# Patient Record
Sex: MALE | Race: WHITE | NOT HISPANIC OR LATINO | Employment: FULL TIME | URBAN - METROPOLITAN AREA
[De-identification: names, ages, dates, MRNs, and addresses within clinical notes are randomized per-mention and may not be internally consistent; named-entity substitution may affect disease eponyms.]

---

## 2018-01-03 ENCOUNTER — COMMUNICATION - HEALTHEAST (OUTPATIENT)
Dept: CARDIOLOGY | Facility: CLINIC | Age: 53
End: 2018-01-03

## 2018-01-10 ENCOUNTER — COMMUNICATION - HEALTHEAST (OUTPATIENT)
Dept: CARDIOLOGY | Facility: CLINIC | Age: 53
End: 2018-01-10

## 2018-01-10 ENCOUNTER — AMBULATORY - HEALTHEAST (OUTPATIENT)
Dept: CARDIOLOGY | Facility: CLINIC | Age: 53
End: 2018-01-10

## 2018-01-10 ENCOUNTER — OFFICE VISIT - HEALTHEAST (OUTPATIENT)
Dept: CARDIOLOGY | Facility: CLINIC | Age: 53
End: 2018-01-10

## 2018-01-10 DIAGNOSIS — I42.8 NONISCHEMIC CARDIOMYOPATHY (H): ICD-10-CM

## 2018-01-10 DIAGNOSIS — I50.20 HEART FAILURE WITH REDUCED EJECTION FRACTION (H): ICD-10-CM

## 2018-01-10 LAB
ANION GAP SERPL CALCULATED.3IONS-SCNC: 10 MMOL/L (ref 5–18)
BUN SERPL-MCNC: 29 MG/DL (ref 8–22)
CALCIUM SERPL-MCNC: 9.9 MG/DL (ref 8.5–10.5)
CHLORIDE BLD-SCNC: 98 MMOL/L (ref 98–107)
CO2 SERPL-SCNC: 26 MMOL/L (ref 22–31)
CREAT SERPL-MCNC: 0.86 MG/DL (ref 0.7–1.3)
GFR SERPL CREATININE-BSD FRML MDRD: >60 ML/MIN/1.73M2
GLUCOSE BLD-MCNC: 85 MG/DL (ref 70–125)
POTASSIUM BLD-SCNC: 5.5 MMOL/L (ref 3.5–5)
SODIUM SERPL-SCNC: 134 MMOL/L (ref 136–145)

## 2018-01-10 ASSESSMENT — MIFFLIN-ST. JEOR: SCORE: 1637.78

## 2018-01-11 ENCOUNTER — AMBULATORY - HEALTHEAST (OUTPATIENT)
Dept: CARDIOLOGY | Facility: CLINIC | Age: 53
End: 2018-01-11

## 2018-01-11 DIAGNOSIS — I42.8 NONISCHEMIC CARDIOMYOPATHY (H): ICD-10-CM

## 2018-01-22 ENCOUNTER — AMBULATORY - HEALTHEAST (OUTPATIENT)
Dept: CARDIOLOGY | Facility: CLINIC | Age: 53
End: 2018-01-22

## 2018-01-26 ENCOUNTER — OFFICE VISIT - HEALTHEAST (OUTPATIENT)
Dept: CARDIOLOGY | Facility: CLINIC | Age: 53
End: 2018-01-26

## 2018-01-26 DIAGNOSIS — I50.22 CHRONIC SYSTOLIC CONGESTIVE HEART FAILURE (H): ICD-10-CM

## 2018-01-26 DIAGNOSIS — I50.20 HEART FAILURE WITH REDUCED EJECTION FRACTION, NYHA CLASS II (H): ICD-10-CM

## 2018-01-26 DIAGNOSIS — I50.9 CHF (CONGESTIVE HEART FAILURE) (H): ICD-10-CM

## 2018-01-26 LAB
ANION GAP SERPL CALCULATED.3IONS-SCNC: 8 MMOL/L (ref 5–18)
ATRIAL RATE - MUSE: 77 BPM
BNP SERPL-MCNC: 188 PG/ML (ref 0–41)
BUN SERPL-MCNC: 19 MG/DL (ref 8–22)
CALCIUM SERPL-MCNC: 9.3 MG/DL (ref 8.5–10.5)
CHLORIDE BLD-SCNC: 103 MMOL/L (ref 98–107)
CO2 SERPL-SCNC: 28 MMOL/L (ref 22–31)
CREAT SERPL-MCNC: 0.84 MG/DL (ref 0.7–1.3)
DIASTOLIC BLOOD PRESSURE - MUSE: NORMAL MMHG
GFR SERPL CREATININE-BSD FRML MDRD: >60 ML/MIN/1.73M2
GLUCOSE BLD-MCNC: 87 MG/DL (ref 70–125)
INTERPRETATION ECG - MUSE: NORMAL
MAGNESIUM SERPL-MCNC: 1.9 MG/DL (ref 1.8–2.6)
P AXIS - MUSE: 53 DEGREES
POTASSIUM BLD-SCNC: 4.5 MMOL/L (ref 3.5–5)
PR INTERVAL - MUSE: 144 MS
QRS DURATION - MUSE: 140 MS
QT - MUSE: 438 MS
QTC - MUSE: 495 MS
R AXIS - MUSE: 78 DEGREES
SODIUM SERPL-SCNC: 139 MMOL/L (ref 136–145)
SYSTOLIC BLOOD PRESSURE - MUSE: NORMAL MMHG
T AXIS - MUSE: 51 DEGREES
VENTRICULAR RATE- MUSE: 77 BPM

## 2018-01-26 ASSESSMENT — MIFFLIN-ST. JEOR: SCORE: 1665

## 2018-01-29 ENCOUNTER — COMMUNICATION - HEALTHEAST (OUTPATIENT)
Dept: CARDIOLOGY | Facility: CLINIC | Age: 53
End: 2018-01-29

## 2018-02-06 ENCOUNTER — HOSPITAL ENCOUNTER (OUTPATIENT)
Dept: CARDIOLOGY | Facility: HOSPITAL | Age: 53
Discharge: HOME | End: 2018-02-06
Attending: INTERNAL MEDICINE

## 2018-02-06 DIAGNOSIS — I50.20 HEART FAILURE WITH REDUCED EJECTION FRACTION, NYHA CLASS II (H): ICD-10-CM

## 2018-02-06 LAB
AORTIC ROOT: 3.6 CM
AORTIC VALVE MEAN VELOCITY: 82 CM/S
AV CUSP SEPERATION: 2.1 CM
AV CUSP SEPERATION: 2.1 CM
AV DIMENSIONLESS INDEX VTI: 0.9
AV MEAN GRADIENT: 3 MMHG
AV PEAK GRADIENT: 3.9 MMHG
AV VALVE AREA: 3 CM2
AV VELOCITY RATIO: 0.8
BSA FOR ECHO PROCEDURE: 1.98 M2
CV ECHO HEIGHT: 69 IN
CV ECHO WEIGHT: 178 LBS
DOP CALC AO PEAK VEL: 98.6 CM/S
DOP CALC AO VTI: 20.7 CM
DOP CALC LVOT AREA: 3.46 CM2
DOP CALC LVOT DIAMETER: 2.1 CM
DOP CALC LVOT PEAK VEL: 83.5 CM/S
DOP CALC LVOT STROKE VOLUME: 62.3 CM3
DOP CALC MV VTI: 21.2 CM
DOP CALCLVOT PEAK VEL VTI: 18 CM
EJECTION FRACTION: 31 % (ref 55–75)
FRACTIONAL SHORTENING: 11.8 % (ref 28–44)
INTERVENTRICULAR SEPTUM IN END DIASTOLE: 1.03 CM (ref 0.6–1)
IVS/PW RATIO: 0.9
LA AREA 1: 20.6 CM2
LA AREA 2: 20.2 CM2
LEFT ATRIUM LENGTH: 5.1 CM
LEFT ATRIUM SIZE: 3.4 CM
LEFT ATRIUM VOLUME INDEX: 35 ML/M2
LEFT ATRIUM VOLUME: 69.4 ML
LEFT VENTRICLE CARDIAC INDEX: 2 L/MIN/M2
LEFT VENTRICLE CARDIAC OUTPUT: 3.9 L/MIN
LEFT VENTRICLE DIASTOLIC VOLUME INDEX: 75.8 CM3/M2 (ref 34–74)
LEFT VENTRICLE DIASTOLIC VOLUME: 150 CM3 (ref 62–150)
LEFT VENTRICLE HEART RATE: 63 BPM
LEFT VENTRICLE MASS INDEX: 123.1 G/M2
LEFT VENTRICLE SYSTOLIC VOLUME INDEX: 52.5 CM3/M2 (ref 11–31)
LEFT VENTRICLE SYSTOLIC VOLUME: 104 CM3 (ref 21–61)
LEFT VENTRICULAR INTERNAL DIMENSION IN DIASTOLE: 5.69 CM (ref 4.2–5.8)
LEFT VENTRICULAR INTERNAL DIMENSION IN SYSTOLE: 5.02 CM (ref 2.5–4)
LEFT VENTRICULAR MASS: 243.7 G
LEFT VENTRICULAR OUTFLOW TRACT MEAN GRADIENT: 2 MMHG
LEFT VENTRICULAR OUTFLOW TRACT MEAN VELOCITY: 66.9 CM/S
LEFT VENTRICULAR OUTFLOW TRACT PEAK GRADIENT: 3 MMHG
LEFT VENTRICULAR POSTERIOR WALL IN END DIASTOLE: 1.09 CM (ref 0.6–1)
LV STROKE VOLUME INDEX: 31.5 ML/M2
MITRAL REGURGITANT VELOCITY TIME INTEGRAL: 180 CM
MITRAL VALVE DECELERATION SLOPE: 4680 MM/S2
MITRAL VALVE E/A RATIO: 2.2
MITRAL VALVE MEAN INFLOW VELOCITY: 48.5 CM/S
MITRAL VALVE PEAK VELOCITY: 102 CM/S
MITRAL VALVE PRESSURE HALF-TIME: 66 MS
MR FLOW: 15 CM3
MR MEAN GRADIENT: 67 MMHG
MR MEAN VELOCITY: 396 CM/S
MR PEAK GRADIENT: 98 MMHG
MV AREA VTI: 2.94 CM2
MV AVERAGE E/E' RATIO: 15.8 CM/S
MV DECELERATION TIME: 165 MS
MV E'TISSUE VEL-LAT: 7.35 CM/S
MV E'TISSUE VEL-MED: 4.35 CM/S
MV LATERAL E/E' RATIO: 12.5
MV MEAN GRADIENT: 1 MMHG
MV MEDIAL E/E' RATIO: 21.2
MV PEAK A VELOCITY: 41.2 CM/S
MV PEAK E VELOCITY: 92.2 CM/S
MV PEAK GRADIENT: 4.2 MMHG
MV VALVE AREA BY CONTINUITY EQUATION: 2.9 CM2
MV VALVE AREA PRESSURE 1/2 METHOD: 3.3 CM2
NUC REST DIASTOLIC VOLUME INDEX: 2848 LBS
NUC REST SYSTOLIC VOLUME INDEX: 69 IN
PISA MR PEAK VEL: 495 CM/S
TRICUSPID REGURGITATION PEAK PRESSURE GRADIENT: 33.2 MMHG
TRICUSPID VALVE ANULAR PLANE SYSTOLIC EXCURSION: 1.6 CM
TRICUSPID VALVE PEAK REGURGITANT VELOCITY: 288 CM/S

## 2018-02-06 ASSESSMENT — MIFFLIN-ST. JEOR: SCORE: 1637.78

## 2018-02-08 ENCOUNTER — COMMUNICATION - HEALTHEAST (OUTPATIENT)
Dept: CARDIOLOGY | Facility: CLINIC | Age: 53
End: 2018-02-08

## 2018-02-08 DIAGNOSIS — I50.9 CHF (CONGESTIVE HEART FAILURE) (H): ICD-10-CM

## 2018-02-08 DIAGNOSIS — I50.22 CHRONIC SYSTOLIC CONGESTIVE HEART FAILURE (H): ICD-10-CM

## 2018-02-09 ENCOUNTER — COMMUNICATION - HEALTHEAST (OUTPATIENT)
Dept: CARDIOLOGY | Facility: CLINIC | Age: 53
End: 2018-02-09

## 2018-02-09 DIAGNOSIS — I50.20 HEART FAILURE WITH REDUCED EJECTION FRACTION (H): ICD-10-CM

## 2018-02-16 ENCOUNTER — OFFICE VISIT - HEALTHEAST (OUTPATIENT)
Dept: CARDIOLOGY | Facility: CLINIC | Age: 53
End: 2018-02-16

## 2018-02-16 ENCOUNTER — AMBULATORY - HEALTHEAST (OUTPATIENT)
Dept: CARDIOLOGY | Facility: CLINIC | Age: 53
End: 2018-02-16

## 2018-02-16 DIAGNOSIS — I50.20 HEART FAILURE WITH REDUCED EJECTION FRACTION (H): ICD-10-CM

## 2018-02-16 DIAGNOSIS — I42.8 NONISCHEMIC CARDIOMYOPATHY (H): ICD-10-CM

## 2018-02-16 RX ORDER — ALBUTEROL SULFATE 90 UG/1
2 AEROSOL, METERED RESPIRATORY (INHALATION) EVERY 4 HOURS PRN
Refills: 0 | Status: SHIPPED | COMMUNITY
Start: 2018-02-06

## 2018-02-16 ASSESSMENT — MIFFLIN-ST. JEOR: SCORE: 1683.37

## 2018-02-20 ENCOUNTER — AMBULATORY - HEALTHEAST (OUTPATIENT)
Dept: CARDIOLOGY | Facility: CLINIC | Age: 53
End: 2018-02-20

## 2018-02-20 DIAGNOSIS — I42.9 CARDIOMYOPATHY (H): ICD-10-CM

## 2018-02-20 DIAGNOSIS — R06.09 DOE (DYSPNEA ON EXERTION): ICD-10-CM

## 2018-02-20 DIAGNOSIS — I50.9 HEART FAILURE (H): ICD-10-CM

## 2018-02-20 DIAGNOSIS — R53.83 FATIGUE: ICD-10-CM

## 2018-02-26 ENCOUNTER — COMMUNICATION - HEALTHEAST (OUTPATIENT)
Dept: CARDIOLOGY | Facility: CLINIC | Age: 53
End: 2018-02-26

## 2018-03-01 ENCOUNTER — HOSPITAL ENCOUNTER (OUTPATIENT)
Dept: MRI IMAGING | Facility: HOSPITAL | Age: 53
Discharge: HOME | End: 2018-03-01

## 2018-03-01 DIAGNOSIS — I50.9 HEART FAILURE (H): ICD-10-CM

## 2018-03-01 DIAGNOSIS — R06.09 OTHER FORMS OF DYSPNEA: ICD-10-CM

## 2018-03-01 DIAGNOSIS — I42.9 CARDIOMYOPATHY (H): ICD-10-CM

## 2018-03-01 DIAGNOSIS — R53.83 FATIGUE: ICD-10-CM

## 2018-03-01 DIAGNOSIS — R06.09 DOE (DYSPNEA ON EXERTION): ICD-10-CM

## 2018-03-02 LAB
CCTA EJECTION FRACTION: 32 %
CCTA INTERVENTRICULAR SETPUM: 0.9 CM (ref 0.6–1.1)
CCTA LEFT INTERNAL DIMENSION IN SYSTOLE: 4.8 CM (ref 2.1–4)
CCTA LEFT VENTRICULAR INTERNAL DIMENSION IN DIASTOLE: 5.9 CM (ref 3.5–6)
CCTA LEFT VENTRICULAR MASS: 194.95 G
CCTA POSTERIOR WALL: 0.8 CM (ref 0.6–1.1)
MR CARDIAC LEFT VENTRIAL CARDIAC INDEX: 1.8 L/MIN/M2 (ref 1.7–4.2)
MR CARDIAC LEFT VENTRICAL CARDIAC OUTPUT: 3.6 L/MIN (ref 2.8–8.8)
MR CARDIAC LEFT VENTRICULAR DIASTOLIC VOLUME INDEX: 75.23 ML/M2 (ref 47–92)
MR CARDIAC LEFT VENTRICULAR MASS INDEX: 84.63 G/M2 (ref 70–113)
MR CARDIAC LEFT VENTRICULAR MASS: 171 G (ref 118–238)
MR CARDIAC LEFT VENTRICULAR STROKE VOLUME INDEX: 27.72 ML/M2 (ref 32–62)
MR CARDIAC LEFT VENTRICULAR SYSTOLIC VOLUME INDEX: 47.51 ML/M2 (ref 13–30)
MR EJECTION FRACTION: 36.84 %
MR HEIGHT: 1.79 M
MR LEFT VENTRICULAR DYSTOLIC VOLUMEN: 152 ML (ref 77–195)
MR LEFT VENTRICULAR STROKE VOLUMEN: 56 ML (ref 51–133)
MR LEFT VENTRICULAR SYSTOLIC VOLUME: 96 ML (ref 19–72)
MR WEIGHT: 83 KG

## 2018-03-19 ENCOUNTER — COMMUNICATION - HEALTHEAST (OUTPATIENT)
Dept: CARDIOLOGY | Facility: CLINIC | Age: 53
End: 2018-03-19

## 2018-03-19 DIAGNOSIS — I50.20 HEART FAILURE WITH REDUCED EJECTION FRACTION (H): ICD-10-CM

## 2018-03-19 DIAGNOSIS — I42.8 NONISCHEMIC CARDIOMYOPATHY (H): ICD-10-CM

## 2018-03-26 ENCOUNTER — AMBULATORY - HEALTHEAST (OUTPATIENT)
Dept: CARDIOLOGY | Facility: CLINIC | Age: 53
End: 2018-03-26

## 2018-03-30 ENCOUNTER — OFFICE VISIT - HEALTHEAST (OUTPATIENT)
Dept: CARDIOLOGY | Facility: CLINIC | Age: 53
End: 2018-03-30

## 2018-03-30 ENCOUNTER — RECORDS - HEALTHEAST (OUTPATIENT)
Dept: ADMINISTRATIVE | Facility: OTHER | Age: 53
End: 2018-03-30

## 2018-03-30 DIAGNOSIS — I42.8 NONISCHEMIC CARDIOMYOPATHY (H): ICD-10-CM

## 2018-03-30 DIAGNOSIS — I50.20 HEART FAILURE WITH REDUCED EJECTION FRACTION (H): ICD-10-CM

## 2018-03-30 LAB
ANION GAP SERPL CALCULATED.3IONS-SCNC: 9 MMOL/L (ref 5–18)
BUN SERPL-MCNC: 17 MG/DL (ref 8–22)
CALCIUM SERPL-MCNC: 9 MG/DL (ref 8.5–10.5)
CHLORIDE BLD-SCNC: 105 MMOL/L (ref 98–107)
CO2 SERPL-SCNC: 25 MMOL/L (ref 22–31)
CREAT SERPL-MCNC: 0.76 MG/DL (ref 0.7–1.3)
GFR SERPL CREATININE-BSD FRML MDRD: >60 ML/MIN/1.73M2
GLUCOSE BLD-MCNC: 109 MG/DL (ref 70–125)
POTASSIUM BLD-SCNC: 4.3 MMOL/L (ref 3.5–5)
SODIUM SERPL-SCNC: 139 MMOL/L (ref 136–145)

## 2018-03-30 ASSESSMENT — MIFFLIN-ST. JEOR: SCORE: 1693.34

## 2018-04-04 ENCOUNTER — AMBULATORY - HEALTHEAST (OUTPATIENT)
Dept: CARDIOLOGY | Facility: CLINIC | Age: 53
End: 2018-04-04

## 2018-04-04 ENCOUNTER — COMMUNICATION - HEALTHEAST (OUTPATIENT)
Dept: CARDIOLOGY | Facility: CLINIC | Age: 53
End: 2018-04-04

## 2018-04-04 DIAGNOSIS — I42.8 NONISCHEMIC CARDIOMYOPATHY (H): ICD-10-CM

## 2018-04-10 ENCOUNTER — COMMUNICATION - HEALTHEAST (OUTPATIENT)
Dept: CARDIOLOGY | Facility: CLINIC | Age: 53
End: 2018-04-10

## 2018-04-19 ENCOUNTER — OFFICE VISIT - HEALTHEAST (OUTPATIENT)
Dept: CARDIOLOGY | Facility: CLINIC | Age: 53
End: 2018-04-19

## 2018-04-19 ENCOUNTER — HOSPITAL ENCOUNTER (OUTPATIENT)
Dept: CARDIOLOGY | Facility: HOSPITAL | Age: 53
Discharge: HOME | End: 2018-04-19

## 2018-04-19 DIAGNOSIS — I42.8 NONISCHEMIC CARDIOMYOPATHY (H): ICD-10-CM

## 2018-04-19 DIAGNOSIS — I50.20 HEART FAILURE WITH REDUCED EJECTION FRACTION (H): ICD-10-CM

## 2018-04-19 LAB
ANION GAP SERPL CALCULATED.3IONS-SCNC: 10 MMOL/L (ref 5–18)
AORTIC ROOT: 3.4 CM
AORTIC VALVE MEAN VELOCITY: 74.8 CM/S
ASCENDING AORTA: 3.6 CM
AV DIMENSIONLESS INDEX VTI: 0.8
AV MEAN GRADIENT: 3 MMHG
AV PEAK GRADIENT: 4.4 MMHG
AV VALVE AREA: 2.4 CM2
AV VELOCITY RATIO: 0.8
BSA FOR ECHO PROCEDURE: 2.05 M2
BUN SERPL-MCNC: 21 MG/DL (ref 8–22)
CALCIUM SERPL-MCNC: 9.4 MG/DL (ref 8.5–10.5)
CHLORIDE BLD-SCNC: 102 MMOL/L (ref 98–107)
CO2 SERPL-SCNC: 27 MMOL/L (ref 22–31)
CREAT SERPL-MCNC: 0.8 MG/DL (ref 0.7–1.3)
CV BLOOD PRESSURE: NORMAL MMHG
CV ECHO HEIGHT: 70 IN
CV ECHO WEIGHT: 188 LBS
DOP CALC AO PEAK VEL: 105 CM/S
DOP CALC AO VTI: 25.9 CM
DOP CALC LVOT AREA: 2.83 CM2
DOP CALC LVOT DIAMETER: 1.9 CM
DOP CALC LVOT PEAK VEL: 88.7 CM/S
DOP CALC LVOT STROKE VOLUME: 60.9 CM3
DOP CALCLVOT PEAK VEL VTI: 21.5 CM
EJECTION FRACTION: 47 % (ref 55–75)
FRACTIONAL SHORTENING: 23 % (ref 28–44)
GFR SERPL CREATININE-BSD FRML MDRD: >60 ML/MIN/1.73M2
GLUCOSE BLD-MCNC: 89 MG/DL (ref 70–125)
INTERVENTRICULAR SEPTUM IN END DIASTOLE: 1.14 CM (ref 0.6–1)
IVS/PW RATIO: 1
LA AREA 1: 18.5 CM2
LA AREA 2: 21.7 CM2
LEFT ATRIUM LENGTH: 5.3 CM
LEFT ATRIUM SIZE: 3.9 CM
LEFT ATRIUM VOLUME INDEX: 31.4 ML/M2
LEFT ATRIUM VOLUME: 64.4 ML
LEFT VENTRICLE CARDIAC INDEX: 1.9 L/MIN/M2
LEFT VENTRICLE CARDIAC OUTPUT: 3.9 L/MIN
LEFT VENTRICLE DIASTOLIC VOLUME INDEX: 65.4 CM3/M2 (ref 34–74)
LEFT VENTRICLE DIASTOLIC VOLUME: 134 CM3 (ref 62–150)
LEFT VENTRICLE HEART RATE: 64 BPM
LEFT VENTRICLE MASS INDEX: 113.3 G/M2
LEFT VENTRICLE SYSTOLIC VOLUME INDEX: 34.4 CM3/M2 (ref 11–31)
LEFT VENTRICLE SYSTOLIC VOLUME: 70.6 CM3 (ref 21–61)
LEFT VENTRICULAR INTERNAL DIMENSION IN DIASTOLE: 5.13 CM (ref 4.2–5.8)
LEFT VENTRICULAR INTERNAL DIMENSION IN SYSTOLE: 3.95 CM (ref 2.5–4)
LEFT VENTRICULAR MASS: 232.3 G
LEFT VENTRICULAR OUTFLOW TRACT MEAN GRADIENT: 2 MMHG
LEFT VENTRICULAR OUTFLOW TRACT MEAN VELOCITY: 61.2 CM/S
LEFT VENTRICULAR OUTFLOW TRACT PEAK GRADIENT: 3 MMHG
LEFT VENTRICULAR POSTERIOR WALL IN END DIASTOLE: 1.18 CM (ref 0.6–1)
LV STROKE VOLUME INDEX: 29.7 ML/M2
MITRAL VALVE E/A RATIO: 2.6
MV AVERAGE E/E' RATIO: 9.2 CM/S
MV DECELERATION TIME: 243 MS
MV E'TISSUE VEL-LAT: 10.6 CM/S
MV E'TISSUE VEL-MED: 7.93 CM/S
MV LATERAL E/E' RATIO: 8.1
MV MEDIAL E/E' RATIO: 10.8
MV PEAK A VELOCITY: 33 CM/S
MV PEAK E VELOCITY: 85.4 CM/S
NUC REST DIASTOLIC VOLUME INDEX: 3008 LBS
NUC REST SYSTOLIC VOLUME INDEX: 70 IN
POTASSIUM BLD-SCNC: 4.4 MMOL/L (ref 3.5–5)
SODIUM SERPL-SCNC: 139 MMOL/L (ref 136–145)
TRICUSPID REGURGITATION PEAK PRESSURE GRADIENT: 21.3 MMHG
TRICUSPID VALVE ANULAR PLANE SYSTOLIC EXCURSION: 1.6 CM
TRICUSPID VALVE PEAK REGURGITANT VELOCITY: 231 CM/S

## 2018-04-19 ASSESSMENT — MIFFLIN-ST. JEOR: SCORE: 1699.01

## 2018-04-20 ENCOUNTER — AMBULATORY - HEALTHEAST (OUTPATIENT)
Dept: CARDIOLOGY | Facility: CLINIC | Age: 53
End: 2018-04-20

## 2018-04-20 ENCOUNTER — COMMUNICATION - HEALTHEAST (OUTPATIENT)
Dept: CARDIOLOGY | Facility: CLINIC | Age: 53
End: 2018-04-20

## 2018-04-20 DIAGNOSIS — R00.1 BRADYCARDIA: ICD-10-CM

## 2018-04-26 ENCOUNTER — COMMUNICATION - HEALTHEAST (OUTPATIENT)
Dept: CARDIOLOGY | Facility: CLINIC | Age: 53
End: 2018-04-26

## 2018-05-18 ENCOUNTER — HOSPITAL ENCOUNTER (OUTPATIENT)
Dept: CARDIOLOGY | Facility: CLINIC | Age: 53
Discharge: HOME | End: 2018-05-18
Attending: INTERNAL MEDICINE

## 2018-05-18 ENCOUNTER — OFFICE VISIT - HEALTHEAST (OUTPATIENT)
Dept: CARDIOLOGY | Facility: CLINIC | Age: 53
End: 2018-05-18

## 2018-05-18 DIAGNOSIS — I42.9 CARDIOMYOPATHY (H): ICD-10-CM

## 2018-05-18 ASSESSMENT — MIFFLIN-ST. JEOR: SCORE: 1680.87

## 2018-05-30 ENCOUNTER — COMMUNICATION - HEALTHEAST (OUTPATIENT)
Dept: CARDIOLOGY | Facility: CLINIC | Age: 53
End: 2018-05-30

## 2018-06-07 ENCOUNTER — COMMUNICATION - HEALTHEAST (OUTPATIENT)
Dept: CARDIOLOGY | Facility: CLINIC | Age: 53
End: 2018-06-07

## 2018-06-12 ENCOUNTER — COMMUNICATION - HEALTHEAST (OUTPATIENT)
Dept: CARDIOLOGY | Facility: CLINIC | Age: 53
End: 2018-06-12

## 2018-07-17 ENCOUNTER — COMMUNICATION - HEALTHEAST (OUTPATIENT)
Dept: CARE COORDINATION | Facility: CLINIC | Age: 53
End: 2018-07-17

## 2018-07-30 ENCOUNTER — COMMUNICATION - HEALTHEAST (OUTPATIENT)
Dept: CARE COORDINATION | Facility: CLINIC | Age: 53
End: 2018-07-30

## 2018-08-22 ENCOUNTER — COMMUNICATION - HEALTHEAST (OUTPATIENT)
Dept: CARDIOLOGY | Facility: CLINIC | Age: 53
End: 2018-08-22

## 2018-09-04 ENCOUNTER — OFFICE VISIT - HEALTHEAST (OUTPATIENT)
Dept: CARDIOLOGY | Facility: CLINIC | Age: 53
End: 2018-09-04

## 2018-09-04 ENCOUNTER — AMBULATORY - HEALTHEAST (OUTPATIENT)
Dept: CARDIOLOGY | Facility: CLINIC | Age: 53
End: 2018-09-04

## 2018-09-04 DIAGNOSIS — I42.9 SECONDARY CARDIOMYOPATHY (H): ICD-10-CM

## 2018-09-04 ASSESSMENT — MIFFLIN-ST. JEOR: SCORE: 1640.04

## 2018-09-19 ENCOUNTER — HOSPITAL ENCOUNTER (OUTPATIENT)
Dept: CARDIOLOGY | Facility: HOSPITAL | Age: 53
Discharge: HOME | End: 2018-09-19
Attending: INTERNAL MEDICINE

## 2018-09-19 ENCOUNTER — AMBULATORY - HEALTHEAST (OUTPATIENT)
Dept: CARDIOLOGY | Facility: CLINIC | Age: 53
End: 2018-09-19

## 2018-09-19 DIAGNOSIS — I42.9 SECONDARY CARDIOMYOPATHY (H): ICD-10-CM

## 2018-09-19 LAB
ANION GAP SERPL CALCULATED.3IONS-SCNC: 8 MMOL/L (ref 5–18)
AORTIC ROOT: 3.6 CM
BNP SERPL-MCNC: 35 PG/ML (ref 0–42)
BSA FOR ECHO PROCEDURE: 1.98 M2
BUN SERPL-MCNC: 24 MG/DL (ref 8–22)
CALCIUM SERPL-MCNC: 9.2 MG/DL (ref 8.5–10.5)
CHLORIDE BLD-SCNC: 106 MMOL/L (ref 98–107)
CO2 SERPL-SCNC: 25 MMOL/L (ref 22–31)
CREAT SERPL-MCNC: 0.73 MG/DL (ref 0.7–1.3)
CV BLOOD PRESSURE: NORMAL MMHG
CV ECHO HEIGHT: 70 IN
CV ECHO WEIGHT: 175 LBS
DOP CALC LVOT AREA: 3.46 CM2
DOP CALC LVOT DIAMETER: 2.1 CM
DOP CALC LVOT STROKE VOLUME: 60.6 CM3
DOP CALCLVOT PEAK VEL VTI: 17.5 CM
EJECTION FRACTION: 52 % (ref 55–75)
ERYTHROCYTE [DISTWIDTH] IN BLOOD BY AUTOMATED COUNT: 12.5 % (ref 11–14.5)
FRACTIONAL SHORTENING: 29.6 % (ref 28–44)
GFR SERPL CREATININE-BSD FRML MDRD: >60 ML/MIN/1.73M2
GLUCOSE BLD-MCNC: 101 MG/DL (ref 70–125)
HCT VFR BLD AUTO: 44.6 % (ref 40–54)
HGB BLD-MCNC: 15.2 G/DL (ref 14–18)
INTERVENTRICULAR SEPTUM IN END DIASTOLE: 0.9 CM (ref 0.6–1)
IVS/PW RATIO: 0.9
LA AREA 1: 16.7 CM2
LA AREA 2: 16.6 CM2
LEFT ATRIUM LENGTH: 5.58 CM
LEFT ATRIUM SIZE: 3.2 CM
LEFT ATRIUM TO AORTIC ROOT RATIO: 0.89 NO UNITS
LEFT ATRIUM VOLUME INDEX: 21.3 ML/M2
LEFT ATRIUM VOLUME: 42.2 ML
LEFT VENTRICLE CARDIAC INDEX: 1.7 L/MIN/M2
LEFT VENTRICLE CARDIAC OUTPUT: 3.3 L/MIN
LEFT VENTRICLE DIASTOLIC VOLUME INDEX: 78.8 CM3/M2 (ref 34–74)
LEFT VENTRICLE DIASTOLIC VOLUME: 156 CM3 (ref 62–150)
LEFT VENTRICLE HEART RATE: 54 BPM
LEFT VENTRICLE MASS INDEX: 97.6 G/M2
LEFT VENTRICLE SYSTOLIC VOLUME INDEX: 37.9 CM3/M2 (ref 11–31)
LEFT VENTRICLE SYSTOLIC VOLUME: 75 CM3 (ref 21–61)
LEFT VENTRICULAR INTERNAL DIMENSION IN DIASTOLE: 5.4 CM (ref 4.2–5.8)
LEFT VENTRICULAR INTERNAL DIMENSION IN SYSTOLE: 3.8 CM (ref 2.5–4)
LEFT VENTRICULAR MASS: 193.3 G
LEFT VENTRICULAR OUTFLOW TRACT MEAN GRADIENT: 1 MMHG
LEFT VENTRICULAR OUTFLOW TRACT MEAN VELOCITY: 54.3 CM/S
LEFT VENTRICULAR POSTERIOR WALL IN END DIASTOLE: 1 CM (ref 0.6–1)
LV STROKE VOLUME INDEX: 30.6 ML/M2
MCH RBC QN AUTO: 30.3 PG (ref 27–34)
MCHC RBC AUTO-ENTMCNC: 34.1 G/DL (ref 32–36)
MCV RBC AUTO: 89 FL (ref 80–100)
MITRAL REGURGITANT VELOCITY TIME INTEGRAL: 178 CM
MITRAL VALVE E/A RATIO: 1.8
MR FLOW: 4 CM3
MR MEAN GRADIENT: 54 MMHG
MR MEAN VELOCITY: 353 CM/S
MR PEAK GRADIENT: 73.3 MMHG
MR PISA EROA: 0 CM2
MR PISA RADIUS: 0.2 CM
MR PISA VN NYQUIST: 38.5 CM/S
MV AVERAGE E/E' RATIO: 7.5 CM/S
MV DECELERATION TIME: 208 MS
MV E'TISSUE VEL-LAT: 12.3 CM/S
MV E'TISSUE VEL-MED: 7.99 CM/S
MV LATERAL E/E' RATIO: 6.2
MV MEDIAL E/E' RATIO: 9.5
MV PEAK A VELOCITY: 42.5 CM/S
MV PEAK E VELOCITY: 76.1 CM/S
MV REGURGITANT VOLUME: 4 CC
NUC REST DIASTOLIC VOLUME INDEX: 2800 LBS
NUC REST SYSTOLIC VOLUME INDEX: 70 IN
PISA MR PEAK VEL: 428 CM/S
PLATELET # BLD AUTO: 351 THOU/UL (ref 140–440)
PMV BLD AUTO: 9.1 FL (ref 8.5–12.5)
POTASSIUM BLD-SCNC: 4.6 MMOL/L (ref 3.5–5)
RBC # BLD AUTO: 5.01 MILL/UL (ref 4.4–6.2)
SODIUM SERPL-SCNC: 139 MMOL/L (ref 136–145)
TRICUSPID REGURGITATION PEAK PRESSURE GRADIENT: 16.6 MMHG
TRICUSPID VALVE ANULAR PLANE SYSTOLIC EXCURSION: 2 CM
TRICUSPID VALVE PEAK REGURGITANT VELOCITY: 204 CM/S
WBC: 7.8 THOU/UL (ref 4–11)

## 2018-09-19 ASSESSMENT — MIFFLIN-ST. JEOR: SCORE: 1640.04

## 2018-09-22 ENCOUNTER — COMMUNICATION - HEALTHEAST (OUTPATIENT)
Dept: CARDIOLOGY | Facility: CLINIC | Age: 53
End: 2018-09-22

## 2018-10-02 ENCOUNTER — COMMUNICATION - HEALTHEAST (OUTPATIENT)
Dept: CARDIOLOGY | Facility: CLINIC | Age: 53
End: 2018-10-02

## 2019-04-15 ENCOUNTER — COMMUNICATION - HEALTHEAST (OUTPATIENT)
Dept: CARDIOLOGY | Facility: CLINIC | Age: 54
End: 2019-04-15

## 2019-04-16 ENCOUNTER — RECORDS - HEALTHEAST (OUTPATIENT)
Dept: ADMINISTRATIVE | Facility: OTHER | Age: 54
End: 2019-04-16

## 2019-04-16 ENCOUNTER — AMBULATORY - HEALTHEAST (OUTPATIENT)
Dept: CARDIOLOGY | Facility: CLINIC | Age: 54
End: 2019-04-16

## 2019-04-22 ENCOUNTER — HOSPITAL ENCOUNTER (OUTPATIENT)
Dept: CARDIOLOGY | Facility: HOSPITAL | Age: 54
Discharge: HOME | End: 2019-04-22

## 2019-04-22 ENCOUNTER — COMMUNICATION - HEALTHEAST (OUTPATIENT)
Dept: CARDIOLOGY | Facility: CLINIC | Age: 54
End: 2019-04-22

## 2019-04-22 ENCOUNTER — OFFICE VISIT - HEALTHEAST (OUTPATIENT)
Dept: CARDIOLOGY | Facility: CLINIC | Age: 54
End: 2019-04-22

## 2019-04-22 DIAGNOSIS — I50.9 CHF (CONGESTIVE HEART FAILURE) (H): ICD-10-CM

## 2019-04-22 DIAGNOSIS — I42.8 NONISCHEMIC CARDIOMYOPATHY (H): ICD-10-CM

## 2019-04-22 DIAGNOSIS — I50.22 CHRONIC SYSTOLIC HEART FAILURE (H): ICD-10-CM

## 2019-04-22 DIAGNOSIS — I50.20 HEART FAILURE WITH REDUCED EJECTION FRACTION (H): ICD-10-CM

## 2019-04-22 LAB
ANION GAP SERPL CALCULATED.3IONS-SCNC: 8 MMOL/L (ref 5–18)
AORTIC ROOT: 3.6 CM
ATRIAL RATE - MUSE: 79 BPM
BNP SERPL-MCNC: 1258 PG/ML (ref 0–42)
BSA FOR ECHO PROCEDURE: 2.06 M2
BUN SERPL-MCNC: 21 MG/DL (ref 8–22)
CALCIUM SERPL-MCNC: 9.3 MG/DL (ref 8.5–10.5)
CHLORIDE BLD-SCNC: 106 MMOL/L (ref 98–107)
CO2 SERPL-SCNC: 24 MMOL/L (ref 22–31)
CREAT SERPL-MCNC: 0.84 MG/DL (ref 0.7–1.3)
CV BLOOD PRESSURE: ABNORMAL MMHG
CV ECHO HEIGHT: 70 IN
CV ECHO WEIGHT: 189 LBS
DIASTOLIC BLOOD PRESSURE - MUSE: NORMAL MMHG
DOP CALC LVOT AREA: 3.46 CM2
DOP CALC LVOT DIAMETER: 2.1 CM
DOP CALC LVOT STROKE VOLUME: 31.6 CM3
DOP CALCLVOT PEAK VEL VTI: 9.14 CM
EJECTION FRACTION: 24 % (ref 55–75)
FRACTIONAL SHORTENING: 5.1 % (ref 28–44)
GFR SERPL CREATININE-BSD FRML MDRD: >60 ML/MIN/1.73M2
GLUCOSE BLD-MCNC: 105 MG/DL (ref 70–125)
INTERPRETATION ECG - MUSE: NORMAL
INTERVENTRICULAR SEPTUM IN END DIASTOLE: 1.3 CM (ref 0.6–1)
IVS/PW RATIO: 0.9
LA AREA 1: 29.6 CM2
LA AREA 2: 26.3 CM2
LEFT ATRIUM LENGTH: 6.73 CM
LEFT ATRIUM SIZE: 4.2 CM
LEFT ATRIUM TO AORTIC ROOT RATIO: 1.17 NO UNITS
LEFT ATRIUM VOLUME INDEX: 47.7 ML/M2
LEFT ATRIUM VOLUME: 98.3 ML
LEFT VENTRICLE CARDIAC INDEX: 1 L/MIN/M2
LEFT VENTRICLE CARDIAC OUTPUT: 2.2 L/MIN
LEFT VENTRICLE DIASTOLIC VOLUME INDEX: 90.3 CM3/M2 (ref 34–74)
LEFT VENTRICLE DIASTOLIC VOLUME: 186 CM3 (ref 62–150)
LEFT VENTRICLE HEART RATE: 68 BPM
LEFT VENTRICLE MASS INDEX: 174.2 G/M2
LEFT VENTRICLE SYSTOLIC VOLUME INDEX: 68.9 CM3/M2 (ref 11–31)
LEFT VENTRICLE SYSTOLIC VOLUME: 142 CM3 (ref 21–61)
LEFT VENTRICULAR INTERNAL DIMENSION IN DIASTOLE: 5.9 CM (ref 4.2–5.8)
LEFT VENTRICULAR INTERNAL DIMENSION IN SYSTOLE: 5.6 CM (ref 2.5–4)
LEFT VENTRICULAR MASS: 358.9 G
LEFT VENTRICULAR OUTFLOW TRACT MEAN GRADIENT: 1 MMHG
LEFT VENTRICULAR OUTFLOW TRACT MEAN VELOCITY: 40.1 CM/S
LEFT VENTRICULAR POSTERIOR WALL IN END DIASTOLE: 1.4 CM (ref 0.6–1)
LV STROKE VOLUME INDEX: 15.4 ML/M2
MITRAL REGURGITANT VELOCITY TIME INTEGRAL: 153 CM
MITRAL VALVE E/A RATIO: 3.2
MR FLOW: 48 CM3
MR MEAN GRADIENT: 58 MMHG
MR MEAN VELOCITY: 354 CM/S
MR PEAK GRADIENT: 91.8 MMHG
MR PISA EROA: 0.3 CM2
MR PISA RADIUS: 0.8 CM
MR PISA VN NYQUIST: 38.5 CM/S
MV AVERAGE E/E' RATIO: 27.5 CM/S
MV DECELERATION TIME: 165 MS
MV E'TISSUE VEL-LAT: 5.55 CM/S
MV E'TISSUE VEL-MED: 2.74 CM/S
MV LATERAL E/E' RATIO: 20.5
MV MEDIAL E/E' RATIO: 41.6
MV PEAK A VELOCITY: 36 CM/S
MV PEAK E VELOCITY: 114 CM/S
MV REGURGITANT VOLUME: 49.4 CC
NUC REST DIASTOLIC VOLUME INDEX: 3024 LBS
NUC REST SYSTOLIC VOLUME INDEX: 70 IN
P AXIS - MUSE: 50 DEGREES
PISA MR PEAK VEL: 479 CM/S
POTASSIUM BLD-SCNC: 4.6 MMOL/L (ref 3.5–5)
PR INTERVAL - MUSE: 142 MS
PR MAX PG: 8 MMHG
PR PEAK VELOCITY: 144 CM/S
QRS DURATION - MUSE: 142 MS
QT - MUSE: 448 MS
QTC - MUSE: 513 MS
R AXIS - MUSE: 78 DEGREES
SODIUM SERPL-SCNC: 138 MMOL/L (ref 136–145)
SYSTOLIC BLOOD PRESSURE - MUSE: NORMAL MMHG
T AXIS - MUSE: 25 DEGREES
TRICUSPID REGURGITATION PEAK PRESSURE GRADIENT: 47.9 MMHG
TRICUSPID VALVE ANULAR PLANE SYSTOLIC EXCURSION: 1.3 CM
TRICUSPID VALVE PEAK REGURGITANT VELOCITY: 346 CM/S
VENTRICULAR RATE- MUSE: 79 BPM

## 2019-04-22 ASSESSMENT — MIFFLIN-ST. JEOR
SCORE: 1703.55
SCORE: 1703.55

## 2019-05-09 ENCOUNTER — OFFICE VISIT - HEALTHEAST (OUTPATIENT)
Dept: CARDIOLOGY | Facility: CLINIC | Age: 54
End: 2019-05-09

## 2019-05-09 DIAGNOSIS — I42.8 NONISCHEMIC CARDIOMYOPATHY (H): ICD-10-CM

## 2019-05-09 DIAGNOSIS — I50.22 CHRONIC SYSTOLIC HEART FAILURE (H): ICD-10-CM

## 2019-05-09 LAB
ANION GAP SERPL CALCULATED.3IONS-SCNC: 9 MMOL/L (ref 5–18)
BUN SERPL-MCNC: 20 MG/DL (ref 8–22)
CALCIUM SERPL-MCNC: 10 MG/DL (ref 8.5–10.5)
CHLORIDE BLD-SCNC: 102 MMOL/L (ref 98–107)
CO2 SERPL-SCNC: 27 MMOL/L (ref 22–31)
CREAT SERPL-MCNC: 0.84 MG/DL (ref 0.7–1.3)
GFR SERPL CREATININE-BSD FRML MDRD: >60 ML/MIN/1.73M2
GLUCOSE BLD-MCNC: 92 MG/DL (ref 70–125)
POTASSIUM BLD-SCNC: 4.8 MMOL/L (ref 3.5–5)
SODIUM SERPL-SCNC: 138 MMOL/L (ref 136–145)

## 2019-05-09 ASSESSMENT — MIFFLIN-ST. JEOR: SCORE: 1680.87

## 2019-05-19 ENCOUNTER — COMMUNICATION - HEALTHEAST (OUTPATIENT)
Dept: CARDIOLOGY | Facility: CLINIC | Age: 54
End: 2019-05-19

## 2019-06-03 ENCOUNTER — OFFICE VISIT - HEALTHEAST (OUTPATIENT)
Dept: CARDIOLOGY | Facility: CLINIC | Age: 54
End: 2019-06-03

## 2019-06-03 DIAGNOSIS — I42.9 SECONDARY CARDIOMYOPATHY (H): ICD-10-CM

## 2019-06-03 DIAGNOSIS — I50.9 CHF (CONGESTIVE HEART FAILURE) (H): ICD-10-CM

## 2019-06-03 LAB
ANION GAP SERPL CALCULATED.3IONS-SCNC: 5 MMOL/L (ref 5–18)
BNP SERPL-MCNC: 84 PG/ML (ref 0–42)
BUN SERPL-MCNC: 25 MG/DL (ref 8–22)
CALCIUM SERPL-MCNC: 9.3 MG/DL (ref 8.5–10.5)
CHLORIDE BLD-SCNC: 103 MMOL/L (ref 98–107)
CO2 SERPL-SCNC: 31 MMOL/L (ref 22–31)
CREAT SERPL-MCNC: 0.81 MG/DL (ref 0.7–1.3)
GFR SERPL CREATININE-BSD FRML MDRD: >60 ML/MIN/1.73M2
GLUCOSE BLD-MCNC: 96 MG/DL (ref 70–125)
MAGNESIUM SERPL-MCNC: 2 MG/DL (ref 1.8–2.6)
POTASSIUM BLD-SCNC: 4.6 MMOL/L (ref 3.5–5)
SODIUM SERPL-SCNC: 139 MMOL/L (ref 136–145)

## 2019-06-03 ASSESSMENT — MIFFLIN-ST. JEOR: SCORE: 1699.01

## 2019-06-20 ENCOUNTER — HOSPITAL ENCOUNTER (OUTPATIENT)
Dept: CARDIOLOGY | Facility: HOSPITAL | Age: 54
Discharge: HOME | End: 2019-06-20
Attending: INTERNAL MEDICINE

## 2019-06-20 DIAGNOSIS — I42.9 SECONDARY CARDIOMYOPATHY (H): ICD-10-CM

## 2019-06-20 LAB
AORTIC ROOT: 3.5 CM
AORTIC VALVE MEAN VELOCITY: 79.8 CM/S
ASCENDING AORTA: 3.5 CM
AV DIMENSIONLESS INDEX VTI: 0.7
AV MEAN GRADIENT: 3 MMHG
AV PEAK GRADIENT: 4.2 MMHG
AV VALVE AREA: 3.5 CM2
AV VELOCITY RATIO: 0.7
BSA FOR ECHO PROCEDURE: 2.05 M2
CV BLOOD PRESSURE: ABNORMAL MMHG
CV ECHO HEIGHT: 70 IN
CV ECHO WEIGHT: 188 LBS
DOP CALC AO PEAK VEL: 103 CM/S
DOP CALC AO VTI: 24.1 CM
DOP CALC LVOT AREA: 4.91 CM2
DOP CALC LVOT DIAMETER: 2.5 CM
DOP CALC LVOT PEAK VEL: 72.5 CM/S
DOP CALC LVOT STROKE VOLUME: 85.4 CM3
DOP CALCLVOT PEAK VEL VTI: 17.4 CM
ECHO EJECTION FRACTION ESTIMATED: 40 %
EJECTION FRACTION: 43 % (ref 55–75)
FRACTIONAL SHORTENING: 19.7 % (ref 28–44)
INTERVENTRICULAR SEPTUM IN END DIASTOLE: 1.04 CM (ref 0.6–1)
IVS/PW RATIO: 1.2
LA AREA 1: 24.4 CM2
LA AREA 2: 24.6 CM2
LEFT ATRIUM LENGTH: 6.4 CM
LEFT ATRIUM SIZE: 4.7 CM
LEFT ATRIUM VOLUME INDEX: 38.9 ML/M2
LEFT ATRIUM VOLUME: 79.7 ML
LEFT VENTRICLE CARDIAC INDEX: 2.2 L/MIN/M2
LEFT VENTRICLE CARDIAC OUTPUT: 4.6 L/MIN
LEFT VENTRICLE DIASTOLIC VOLUME INDEX: 58.5 CM3/M2 (ref 34–74)
LEFT VENTRICLE DIASTOLIC VOLUME: 120 CM3 (ref 62–150)
LEFT VENTRICLE HEART RATE: 54 BPM
LEFT VENTRICLE MASS INDEX: 114.4 G/M2
LEFT VENTRICLE SYSTOLIC VOLUME INDEX: 33.4 CM3/M2 (ref 11–31)
LEFT VENTRICLE SYSTOLIC VOLUME: 68.5 CM3 (ref 21–61)
LEFT VENTRICULAR INTERNAL DIMENSION IN DIASTOLE: 5.98 CM (ref 4.2–5.8)
LEFT VENTRICULAR INTERNAL DIMENSION IN SYSTOLE: 4.8 CM (ref 2.5–4)
LEFT VENTRICULAR MASS: 234.6 G
LEFT VENTRICULAR OUTFLOW TRACT MEAN GRADIENT: 1 MMHG
LEFT VENTRICULAR OUTFLOW TRACT MEAN VELOCITY: 56.4 CM/S
LEFT VENTRICULAR OUTFLOW TRACT PEAK GRADIENT: 2 MMHG
LEFT VENTRICULAR POSTERIOR WALL IN END DIASTOLE: 0.89 CM (ref 0.6–1)
LV STROKE VOLUME INDEX: 41.6 ML/M2
MITRAL VALVE DECELERATION SLOPE: 4260 MM/S2
MITRAL VALVE E/A RATIO: 3.5
MITRAL VALVE PRESSURE HALF-TIME: 71 MS
MV AVERAGE E/E' RATIO: 12.1 CM/S
MV DECELERATION TIME: 243 MS
MV E'TISSUE VEL-LAT: 9.9 CM/S
MV E'TISSUE VEL-MED: 7.07 CM/S
MV LATERAL E/E' RATIO: 10.4
MV MEDIAL E/E' RATIO: 14.6
MV PEAK A VELOCITY: 29.5 CM/S
MV PEAK E VELOCITY: 103 CM/S
MV VALVE AREA PRESSURE 1/2 METHOD: 3.1 CM2
NUC REST DIASTOLIC VOLUME INDEX: 3008 LBS
NUC REST SYSTOLIC VOLUME INDEX: 70 IN
TRICUSPID REGURGITATION PEAK PRESSURE GRADIENT: 11.3 MMHG
TRICUSPID VALVE ANULAR PLANE SYSTOLIC EXCURSION: 2 CM
TRICUSPID VALVE PEAK REGURGITANT VELOCITY: 168 CM/S

## 2019-06-20 ASSESSMENT — MIFFLIN-ST. JEOR: SCORE: 1699.01

## 2019-06-22 ENCOUNTER — COMMUNICATION - HEALTHEAST (OUTPATIENT)
Dept: CARDIOLOGY | Facility: CLINIC | Age: 54
End: 2019-06-22

## 2019-06-23 ENCOUNTER — COMMUNICATION - HEALTHEAST (OUTPATIENT)
Dept: CARDIOLOGY | Facility: CLINIC | Age: 54
End: 2019-06-23

## 2019-07-30 ENCOUNTER — RECORDS - HEALTHEAST (OUTPATIENT)
Dept: ADMINISTRATIVE | Facility: OTHER | Age: 54
End: 2019-07-30

## 2019-08-02 ENCOUNTER — COMMUNICATION - HEALTHEAST (OUTPATIENT)
Dept: CARDIOLOGY | Facility: CLINIC | Age: 54
End: 2019-08-02

## 2019-10-30 ENCOUNTER — RECORDS - HEALTHEAST (OUTPATIENT)
Dept: ADMINISTRATIVE | Facility: OTHER | Age: 54
End: 2019-10-30

## 2020-03-05 ENCOUNTER — OFFICE VISIT - HEALTHEAST (OUTPATIENT)
Dept: CARDIOLOGY | Facility: CLINIC | Age: 55
End: 2020-03-05

## 2020-03-05 DIAGNOSIS — I50.22 CHRONIC SYSTOLIC HEART FAILURE (H): ICD-10-CM

## 2020-03-05 DIAGNOSIS — I42.8 NONISCHEMIC CARDIOMYOPATHY (H): ICD-10-CM

## 2020-03-05 ASSESSMENT — MIFFLIN-ST. JEOR: SCORE: 1731.9

## 2020-06-10 ENCOUNTER — COMMUNICATION - HEALTHEAST (OUTPATIENT)
Dept: ADMINISTRATIVE | Facility: CLINIC | Age: 55
End: 2020-06-10

## 2021-03-15 ENCOUNTER — COMMUNICATION - HEALTHEAST (OUTPATIENT)
Dept: SCHEDULING | Facility: CLINIC | Age: 56
End: 2021-03-15

## 2021-03-16 ENCOUNTER — COMMUNICATION - HEALTHEAST (OUTPATIENT)
Dept: CARDIOLOGY | Facility: CLINIC | Age: 56
End: 2021-03-16

## 2021-03-16 ENCOUNTER — RECORDS - HEALTHEAST (OUTPATIENT)
Dept: ADMINISTRATIVE | Facility: OTHER | Age: 56
End: 2021-03-16

## 2021-03-17 ENCOUNTER — OFFICE VISIT - HEALTHEAST (OUTPATIENT)
Dept: CARDIOLOGY | Facility: CLINIC | Age: 56
End: 2021-03-17

## 2021-03-17 DIAGNOSIS — I50.20 HEART FAILURE WITH REDUCED EJECTION FRACTION (H): ICD-10-CM

## 2021-03-17 DIAGNOSIS — I42.8 NONISCHEMIC CARDIOMYOPATHY (H): ICD-10-CM

## 2021-03-17 ASSESSMENT — MIFFLIN-ST. JEOR: SCORE: 1678.61

## 2021-03-24 ENCOUNTER — COMMUNICATION - HEALTHEAST (OUTPATIENT)
Dept: CARDIOLOGY | Facility: CLINIC | Age: 56
End: 2021-03-24

## 2021-03-25 ENCOUNTER — COMMUNICATION - HEALTHEAST (OUTPATIENT)
Dept: CARDIOLOGY | Facility: CLINIC | Age: 56
End: 2021-03-25

## 2021-03-25 ENCOUNTER — OFFICE VISIT - HEALTHEAST (OUTPATIENT)
Dept: CARDIOLOGY | Facility: CLINIC | Age: 56
End: 2021-03-25

## 2021-03-25 DIAGNOSIS — I50.9 ACUTE DECOMPENSATED HEART FAILURE (H): ICD-10-CM

## 2021-03-25 DIAGNOSIS — I63.411 CEREBRAL INFARCTION DUE TO EMBOLISM OF RIGHT MIDDLE CEREBRAL ARTERY (H): ICD-10-CM

## 2021-03-25 DIAGNOSIS — I50.20 HEART FAILURE WITH REDUCED EJECTION FRACTION (H): ICD-10-CM

## 2021-03-25 DIAGNOSIS — I42.8 NONISCHEMIC CARDIOMYOPATHY (H): ICD-10-CM

## 2021-03-25 LAB
ANION GAP SERPL CALCULATED.3IONS-SCNC: 9 MMOL/L (ref 5–18)
BUN SERPL-MCNC: 25 MG/DL (ref 8–22)
CALCIUM SERPL-MCNC: 9.2 MG/DL (ref 8.5–10.5)
CHLORIDE BLD-SCNC: 101 MMOL/L (ref 98–107)
CO2 SERPL-SCNC: 28 MMOL/L (ref 22–31)
CREAT SERPL-MCNC: 0.9 MG/DL (ref 0.7–1.3)
GFR SERPL CREATININE-BSD FRML MDRD: >60 ML/MIN/1.73M2
GLUCOSE BLD-MCNC: 96 MG/DL (ref 70–125)
POTASSIUM BLD-SCNC: 5 MMOL/L (ref 3.5–5)
SODIUM SERPL-SCNC: 138 MMOL/L (ref 136–145)

## 2021-03-25 RX ORDER — ATORVASTATIN CALCIUM 40 MG/1
40 TABLET, FILM COATED ORAL DAILY
Qty: 90 TABLET | Refills: 0 | Status: SHIPPED | OUTPATIENT
Start: 2021-03-25 | End: 2021-09-17

## 2021-03-25 RX ORDER — SPIRONOLACTONE 25 MG/1
12.5 TABLET ORAL DAILY
Qty: 45 TABLET | Refills: 0 | Status: SHIPPED | OUTPATIENT
Start: 2021-03-25 | End: 2021-07-28

## 2021-03-25 RX ORDER — ENALAPRIL MALEATE 10 MG/1
10 TABLET ORAL DAILY
Qty: 90 TABLET | Refills: 0 | Status: SHIPPED | OUTPATIENT
Start: 2021-03-25 | End: 2021-07-28

## 2021-03-25 ASSESSMENT — MIFFLIN-ST. JEOR: SCORE: 1651.39

## 2021-04-01 ENCOUNTER — RECORDS - HEALTHEAST (OUTPATIENT)
Dept: ADMINISTRATIVE | Facility: OTHER | Age: 56
End: 2021-04-01

## 2021-04-01 ENCOUNTER — AMBULATORY - HEALTHEAST (OUTPATIENT)
Dept: CARDIOLOGY | Facility: CLINIC | Age: 56
End: 2021-04-01

## 2021-04-02 ENCOUNTER — RECORDS - HEALTHEAST (OUTPATIENT)
Dept: ADMINISTRATIVE | Facility: OTHER | Age: 56
End: 2021-04-02

## 2021-04-02 ENCOUNTER — AMBULATORY - HEALTHEAST (OUTPATIENT)
Dept: CARDIOLOGY | Facility: CLINIC | Age: 56
End: 2021-04-02

## 2021-04-08 ENCOUNTER — OFFICE VISIT - HEALTHEAST (OUTPATIENT)
Dept: CARDIOLOGY | Facility: CLINIC | Age: 56
End: 2021-04-08

## 2021-04-08 DIAGNOSIS — I42.8 NONISCHEMIC CARDIOMYOPATHY (H): ICD-10-CM

## 2021-04-08 DIAGNOSIS — I50.20 HEART FAILURE WITH REDUCED EJECTION FRACTION (H): ICD-10-CM

## 2021-04-08 DIAGNOSIS — I50.9 ACUTE DECOMPENSATED HEART FAILURE (H): ICD-10-CM

## 2021-04-08 LAB
ANION GAP SERPL CALCULATED.3IONS-SCNC: 9 MMOL/L (ref 5–18)
BUN SERPL-MCNC: 27 MG/DL (ref 8–22)
CALCIUM SERPL-MCNC: 8.8 MG/DL (ref 8.5–10.5)
CHLORIDE BLD-SCNC: 103 MMOL/L (ref 98–107)
CO2 SERPL-SCNC: 25 MMOL/L (ref 22–31)
CREAT SERPL-MCNC: 0.9 MG/DL (ref 0.7–1.3)
GFR SERPL CREATININE-BSD FRML MDRD: >60 ML/MIN/1.73M2
GLUCOSE BLD-MCNC: 99 MG/DL (ref 70–125)
POTASSIUM BLD-SCNC: 4.7 MMOL/L (ref 3.5–5)
SODIUM SERPL-SCNC: 137 MMOL/L (ref 136–145)

## 2021-04-08 ASSESSMENT — MIFFLIN-ST. JEOR: SCORE: 1696.75

## 2021-04-22 ENCOUNTER — RECORDS - HEALTHEAST (OUTPATIENT)
Dept: ADMINISTRATIVE | Facility: OTHER | Age: 56
End: 2021-04-22

## 2021-04-22 ENCOUNTER — AMBULATORY - HEALTHEAST (OUTPATIENT)
Dept: CARDIOLOGY | Facility: CLINIC | Age: 56
End: 2021-04-22

## 2021-04-26 ENCOUNTER — OFFICE VISIT - HEALTHEAST (OUTPATIENT)
Dept: CARDIOLOGY | Facility: CLINIC | Age: 56
End: 2021-04-26

## 2021-04-26 ENCOUNTER — COMMUNICATION - HEALTHEAST (OUTPATIENT)
Dept: CARDIOLOGY | Facility: CLINIC | Age: 56
End: 2021-04-26

## 2021-04-26 DIAGNOSIS — I42.8 NONISCHEMIC CARDIOMYOPATHY (H): ICD-10-CM

## 2021-04-26 DIAGNOSIS — I50.9 CHF (CONGESTIVE HEART FAILURE) (H): ICD-10-CM

## 2021-04-26 DIAGNOSIS — I50.20 HEART FAILURE WITH REDUCED EJECTION FRACTION (H): ICD-10-CM

## 2021-04-26 LAB
ANION GAP SERPL CALCULATED.3IONS-SCNC: 9 MMOL/L (ref 5–18)
BNP SERPL-MCNC: 99 PG/ML (ref 0–45)
BUN SERPL-MCNC: 19 MG/DL (ref 8–22)
CALCIUM SERPL-MCNC: 8.7 MG/DL (ref 8.5–10.5)
CHLORIDE BLD-SCNC: 104 MMOL/L (ref 98–107)
CO2 SERPL-SCNC: 27 MMOL/L (ref 22–31)
CREAT SERPL-MCNC: 0.82 MG/DL (ref 0.7–1.3)
GFR SERPL CREATININE-BSD FRML MDRD: >60 ML/MIN/1.73M2
GLUCOSE BLD-MCNC: 95 MG/DL (ref 70–125)
MAGNESIUM SERPL-MCNC: 2 MG/DL (ref 1.8–2.6)
POTASSIUM BLD-SCNC: 4.3 MMOL/L (ref 3.5–5)
SODIUM SERPL-SCNC: 140 MMOL/L (ref 136–145)

## 2021-04-27 ENCOUNTER — AMBULATORY - HEALTHEAST (OUTPATIENT)
Dept: CARDIOLOGY | Facility: CLINIC | Age: 56
End: 2021-04-27

## 2021-04-27 DIAGNOSIS — I50.9 ACUTE DECOMPENSATED HEART FAILURE (H): ICD-10-CM

## 2021-05-12 ENCOUNTER — RECORDS - HEALTHEAST (OUTPATIENT)
Dept: CARDIOLOGY | Facility: CLINIC | Age: 56
End: 2021-05-12

## 2021-05-12 ENCOUNTER — RECORDS - HEALTHEAST (OUTPATIENT)
Dept: ADMINISTRATIVE | Facility: OTHER | Age: 56
End: 2021-05-12

## 2021-05-14 ENCOUNTER — OFFICE VISIT - HEALTHEAST (OUTPATIENT)
Dept: CARDIOLOGY | Facility: CLINIC | Age: 56
End: 2021-05-14

## 2021-05-14 ENCOUNTER — HOSPITAL ENCOUNTER (OUTPATIENT)
Dept: CARDIOLOGY | Facility: CLINIC | Age: 56
Discharge: HOME | End: 2021-05-14
Attending: INTERNAL MEDICINE

## 2021-05-14 DIAGNOSIS — I50.20 HEART FAILURE WITH REDUCED EJECTION FRACTION (H): ICD-10-CM

## 2021-05-14 DIAGNOSIS — I42.8 NONISCHEMIC CARDIOMYOPATHY (H): ICD-10-CM

## 2021-05-14 DIAGNOSIS — I50.9 CHF (CONGESTIVE HEART FAILURE) (H): ICD-10-CM

## 2021-05-14 LAB
BSA FOR ECHO PROCEDURE: 2.07 M2
CV BLOOD PRESSURE: NORMAL MMHG
CV ECHO HEIGHT: 69 IN
CV ECHO WEIGHT: 194 LBS
ECHO EJECTION FRACTION ESTIMATED: 40 %
EJECTION FRACTION: 36 % (ref 55–75)
INTERVENTRICULAR SEPTUM IN END DIASTOLE: 0.9 CM (ref 0.6–1)
IVS/PW RATIO: 1
LEFT VENTRICLE DIASTOLIC VOLUME INDEX: 43.7 CM3/M2 (ref 34–74)
LEFT VENTRICLE DIASTOLIC VOLUME: 90.5 CM3 (ref 62–150)
LEFT VENTRICLE HEART RATE: 64 BPM
LEFT VENTRICLE MASS INDEX: 101.2 G/M2
LEFT VENTRICLE SYSTOLIC VOLUME INDEX: 27.9 CM3/M2 (ref 11–31)
LEFT VENTRICLE SYSTOLIC VOLUME: 57.8 CM3 (ref 21–61)
LEFT VENTRICULAR INTERNAL DIMENSION IN DIASTOLE: 5.9 CM (ref 4.2–5.8)
LEFT VENTRICULAR MASS: 209.6 G
LEFT VENTRICULAR POSTERIOR WALL IN END DIASTOLE: 0.9 CM (ref 0.6–1)
NUC REST DIASTOLIC VOLUME INDEX: 3104 LBS
NUC REST SYSTOLIC VOLUME INDEX: 69 IN

## 2021-05-14 RX ORDER — CARVEDILOL 12.5 MG/1
18.75 TABLET ORAL 2 TIMES DAILY WITH MEALS
Qty: 180 TABLET | Refills: 3 | Status: SHIPPED | OUTPATIENT
Start: 2021-05-14 | End: 2021-07-28

## 2021-05-14 ASSESSMENT — MIFFLIN-ST. JEOR
SCORE: 1714.89
SCORE: 1710.36

## 2021-05-27 VITALS — BODY MASS INDEX: 28.73 KG/M2 | HEIGHT: 69 IN | WEIGHT: 194 LBS

## 2021-05-27 VITALS
RESPIRATION RATE: 16 BRPM | WEIGHT: 195 LBS | HEART RATE: 64 BPM | BODY MASS INDEX: 28.88 KG/M2 | SYSTOLIC BLOOD PRESSURE: 122 MMHG | DIASTOLIC BLOOD PRESSURE: 78 MMHG | HEIGHT: 69 IN

## 2021-05-27 NOTE — TELEPHONE ENCOUNTER
"----- Message from Adriana Montelongo sent at 4/15/2019 10:00 AM CDT -----  Contact: JULIETA'S EX WIFE MARGIE  General phone call:    Caller: margie  Primary cardiologist: pt is seeing abbey on 4/22  Detailed reason for call: Margie called stating that Julieta is very non compliant. He has been feeling very crummy lately, hard time breathing (he does have ashma) and other issues. She is hoping to have a nurse call her so she can explain to them how to \"deal with him\".   New or active symptoms? active  Best phone number: [phone]  Best time to contact: any  Ok to leave a detailed message? yes  Device? no    Additional Info:   "

## 2021-05-27 NOTE — TELEPHONE ENCOUNTER
Received a call from patient's ex-wife. She wanted to update Vane that the patient has been non-compliant since the Fall when he was told he may need an ICD. Per his ex-wife, approach is key with him. When it was addressed back in the fall, apparently the patient felt that all we wanted to do was to make extra money as his ejection fraction was improved with medication. Informed Margie that it is crucial for him to comply with appts and medications as these can increase his EF but he needs to continue his medications as if he does not, he may require ICD. She knows all this and reports that he can be quite stubborn. She just wanted us to be aware of this and that he really likes Vane and will listen to what she has to say. He has had some symptoms of fatigue and shortness of breath lately and she is not sure if he has been taking his medications or not. Will pass along.      Vane,  Non-urgent update on Dwain for your return. He will see you 4/22/19.  Thanks,  Mal

## 2021-05-28 NOTE — TELEPHONE ENCOUNTER
Left VM for patient regarding lab results. Kidney function and electrolytes normal. BNP moderately elevated. Instructed him to start taking lasix 40 mg daily and will send a new script to pharmacy. Instructed him to call Zeny if any questions and to call this week if symptoms do not improve. Med list updated.

## 2021-05-28 NOTE — PATIENT INSTRUCTIONS - HE
Matthew W Behr,    It was a pleasure to see you today at the [facility].     My recommendations after this visit include:  - Please follow up with Dr Oliveira in 6-8 weeks   - Please follow up with Vane Martin in 2 weeks  - I have checked some labs and will contact you with the results  - I have ordered an echocardiogram, please schedule at your earliest convenience    Vane Martin CNP    What is the [facility] Failure [facility]?     The [facility] is a heart failure specialty clinic within [facility].  You will work with your cardiologist, nurse practitioner, and nurses to carefully adjust medications and learn how to live with heart failure.  The Heart Failure Program will help you:      Better understand your chronic heart condition    Feel better and avoid hospital stays    Monitoring for Symptoms      Call the Heart Failure Phone Line ([phone]) if you have any of these symptoms:     Increased shortness of breath/shortness of breath at rest    Waking up at night with difficulty breathing    Unable to lie down for sleep due to symptoms or needing to sit upright for sleep    Weight gain of 2 pounds a day for 2 days in a row OR 5 pounds in 1 week    Increased swelling in your ankles or legs    Dizziness or lightheadedness    Medications       Take your medications as prescribed    Bring all your medications in their original bottles to every appointment    Avoid non-steroidal anti-inflammatory medications (Advil, Aleve, Ibuprofen, Naprosyn, Naproxen, Celebrex)    Do not stop taking your medications or begin taking over-the-counter or herbal medications without first talking to your doctor or nurse practitioner    Diet and Lifestyle       Limit sodium/salt to 2000 mg daily   o Read food labels for sodium content  o Do not add salt when cooking or add salt at the table    Weigh yourself every day and record in your daily weight log   o Call if you  gain 2 pounds a day for 2 days in a row OR 5 pounds in 1 week  o Bring daily weight log to every appointment    Stay active, pace yourself, listen to your body, and rest when tired    Elevate your legs if they are swollen. Ask about using compression/support stockings    Stop smoking    Lose weight if you are overweight    Avoid drinking alcohol or limit amount    Stay updated on your immunizations including flu and pneumonia vaccines

## 2021-05-28 NOTE — PATIENT INSTRUCTIONS - HE
Matthew W Behr,    It was a pleasure to see you today at the [facility].     My recommendations after this visit include:  - Please follow up with Dr Oliveira Concha 3   - Please follow up with Vane Martin in 6 weeks  - I have checked labs and will contact you with results on MyChart  - Stop your spironolactone    Vane Martin, CNP

## 2021-05-29 NOTE — PATIENT INSTRUCTIONS - HE
Please call my nurse Paradise with any heart related questions.Her number is [phone].Please contact your insurance and find out the cost of Entresto at let my know.

## 2021-05-29 NOTE — PROGRESS NOTES
[facility] Progress Note    Assessment:  1.  Heart failure.  Nonischemic cardiomyopathy with acute worsening of heart failure symptoms treated in the hospital April 2019.  Likely exacerbated by not taking his medications for a few months prior.  We spent some time discussing the importance of his medications and have arranged for follow-up echocardiogram and laboratory studies.  He did not reportedly tolerate Aldactone because of nausea.  We will make additional recommendations pending the outcome of the tests regarding Acacian adjustment.  I have asked him to be prudent with respect to his exercise and continue to monitor his weights and sodium intake.  He is asked to be in touch with us with any specific questions.  I asked him to be in touch with his insurance carrier regarding the potential cost of Entresto.      Plan: Lab studies.  Echocardiogram.  Follow-up 6 weeks.    1. Secondary cardiomyopathy (H)  Echo Complete   2. CHF (congestive heart failure) (H)  BNP(B-type Natriuretic Peptide)    Basic metabolic panel    Magnesium         An After Visit Summary was printed and given to the patient.    Subjective:    Matthew W Behr is a 53 y.o. male who returned for a planned  follow up visit.  He reports that over the past few weeks he is been feeling much better.  He has a history of nonischemic cardiomyopathy and when we visited September 2018 he was well compensated and BNP was down to 35.  Presented initially December 2017 with findings of heart failure.  He was started on Procrit medication and had no obvious obstructive coronary artery disease on coronary angiography.  He had significant improvement in left ventricular function with ejection fraction that increased to 40 to 45%.  Unfortunately a number of months ago he stopped his carvedilol and then presented with increasing symptoms of shortness of breath that was initially treated for pneumonia.  He subsequently was evaluated here in our  clinic and admitted to the hospital where he was found to have a left ventricular systolic dysfunction and worsening heart failure symptoms.  His BNP in the April 2019 admission was up to 1258.  Did not tolerate spironolactone because of nausea but has been tolerating his current combination of medications which we reviewed in detail.  We again did discuss the importance of taking his medications and the reasons for his medications.  I did ask him to further investigate potential cost of Entresto as he may benefit from changing to Entresto.    Denies orthopnea or PND.  He states he has been active and has had no specific difficulty with shortness of breath, dizziness, palpitation over the last few weeks.    Review of Systems:   General: WNL  Eyes: WNL  Ears/Nose/Throat: WNL  Lungs: WNL  Heart: WNL  Stomach: WNL  Bladder: WNL  Muscle/Joints: WNL  Skin: WNL  Nervous System: WNL  Mental Health: WNL     Blood: WNL      Problem List:    Patient Active Problem List   Diagnosis     Mild intermittent asthma without complication     Wide-complex tachycardia (H)     Heart failure with reduced ejection fraction (H)     Nonischemic cardiomyopathy (H)     Amphetamine urine screen positive     Acute CHF (H)     Chest pain with normal coronary angiography     Acute on chronic systolic congestive heart failure (H)       Social History     Socioeconomic History     Marital status:      Spouse name: Not on file     Number of children: Not on file     Years of education: Not on file     Highest education level: Not on file   Occupational History     Employer: [employer]   Social Needs     Financial resource strain: Not on file     Food insecurity:     Worry: Not on file     Inability: Not on file     Transportation needs:     Medical: Not on file     Non-medical: Not on file   Tobacco Use     Smoking status: Never Smoker     Smokeless tobacco: Current User     Types: Chew   Substance and Sexual Activity     Alcohol use: Yes     " Comment: 5 beers per week     Drug use: No     Sexual activity: Not on file   Lifestyle     Physical activity:     Days per week: Not on file     Minutes per session: Not on file     Stress: Not on file   Relationships     Social connections:     Talks on phone: Not on file     Gets together: Not on file     Attends Jainism service: Not on file     Active member of club or organization: Not on file     Attends meetings of clubs or organizations: Not on file     Relationship status: Not on file     Intimate partner violence:     Fear of current or ex partner: Not on file     Emotionally abused: Not on file     Physically abused: Not on file     Forced sexual activity: Not on file   Other Topics Concern     Not on file   Social History Narrative     Not on file       Family History   Problem Relation Age of Onset     COPD Mother        Current Outpatient Medications   Medication Sig Dispense Refill     aspirin 81 MG EC tablet Take 1 tablet (81 mg total) by mouth daily.  0     carvedilol (COREG) 12.5 MG tablet Take 1 tablet (12.5 mg total) by mouth 2 (two) times a day with meals. 180 tablet 3     enalapril (VASOTEC) 10 MG tablet Take 1 tablet (10 mg total) by mouth 2 (two) times a day. 180 tablet 3     furosemide (LASIX) 40 MG tablet Take 1 tablet (40 mg total) by mouth 2 (two) times a day at 9am and 6pm. (Patient taking differently: Take 20 mg by mouth 2 (two) times a day at 9am and 6pm.       ) 60 tablet 5     VENTOLIN HFA 90 mcg/actuation inhaler Take 2 puffs by mouth every 4 (four) hours as needed.         0     No current facility-administered medications for this visit.        Objective:     /68 (Patient Site: Left Arm, Patient Position: Sitting, Cuff Size: Adult Regular)   Pulse (!) 56   Resp 16   Ht 5' 10\" (1.778 m)   Wt 188 lb (85.3 kg)   BMI 26.98 kg/m    188 lb (85.3 kg)   184 pounds January 2018  189 pounds April 22, 2019.    Physical Exam:    GENERAL APPEARANCE: alert, no apparent " distress  HEENT: no scleral icterus or xanthelasma  NECK: jugular venous pressure within normal limits.  CHEST: symmetric, the lungs are clear to auscultation  CARDIOVASCULAR: regular rhythm without significant murmur, click, or gallop; no carotid bruits  Abdomen: No Organomegaly, masses, bruits, or tenderness. Bowels sounds are present      EXTREMITIES: no cyanosis, clubbing or edema    Cardiac Testing:  Patient Information     Patient Name  Behr, Matthew W MRN  [id number] Sex  Male              Age  1965 (53 y.o.)   Indications     Dx: Nonischemic cardiomyopathy (H) [I42.8 (ICD-10-CM)]   Summary       Left ventricle is mild to moderately dilated    Left ventricle ejection fraction is severely globally reduced. The calculated left ventricular ejection fraction is 24%.    E/e' > 15, suggesting elevated LV filling pressures.    Moderate left atrial enlargement with mild right atrial enlargement    Mild right ventricular enlargement with moderate reduction in right ventricular function    Severe eccentric jet of mitral regurgitation.    Moderate tricuspid regurgitation with elevated estimate of RV systolic pressures at 48 mmHg plus right atrial pressure    Dilated IVC consistent with elevated central venous pressure    When compared to the previous study dated 2018, there has been a significant interval decline in both LV function and RV function with dilatation of both the left ventricle and right ventricle. Significant mitral regurgitation now noted     HOLTER MONITOR     INTERPRETATION:  Predominant rhythm is sinus rhythm with rates ranging from 38 beats  per minute to 124 beats per minute.  An IVCD, probably right bundle-branch block is  present throughout the monitoring period.  Sinus arrhythmia was noted at night.   There were no clinically significant pauses.  Occasional atrial premature beats were  noted, 352 over 24 hours.  There were 8 couplets.  There were 4 short runs of  primary atrial  tachycardia; the longest lasted 2.5 seconds at a rate of 160 beats  per minute.  No atrial fibrillation was noted.  Rare single ventricular premature  beats were present, 64 over 24 hours with a single ventricular couplet.  No diary  was submitted.     CONCLUSION:  Essentially normal Holter.  Right bundle-branch block and short runs of  primary atrial tachycardia noted.        OSEI MANNING 2018 13:07:45  T 2018 13:31:23  R 2018 13:31:23  [id number]    Reading physician: Grover Oliveira MD Ordering physician: Nelly Petit MD Study date: 1/3/18   Patient Information     Patient Name  Behr, Matthew W MRN  [id number] Sex  Male              Age  1965 (53 y.o.)   Indications     cardiomyopathy; Heart failure; Newly diagnosed cardiomyopathy   Interpretation Summary       The total Agatston calcium score is 0. A calcium score of zero places the individual in the lowest quartile when compared to an age and gender matched control group and implies a low risk of cardiac events in the next 10 years. (less than 1% per year).    No detectable atherosclerotic plaque or significant stenosis observed.    Significant misregistration artifact decreases ability to interpret the examination the findings are most consistent with artifact rather than obstructive artery disease.    Mild dilatation of the proximal aorta at the level of the sinuses of Valsalva    Please see radiology interpretation for additional findings.       Lab Results:    Lab Results   Component Value Date     2019    K 4.8 2019     2019    CO2 27 2019    BUN 20 2019    CREATININE 0.84 2019    CALCIUM 10.0 2019     Lab Results   Component Value Date    CHOL 145 2018    TRIG 83 2018    HDL 51 2018     BNP (pg/mL)   Date Value   2019 1,258 (H)   2018 35   2018 188 (H)     Creatinine (mg/dL)   Date Value   2019 0.84   2019  0.88   04/23/2019 0.88   04/22/2019 0.83     LDL Calculated (mg/dL)   Date Value   01/01/2018 77     Lab Results   Component Value Date    WBC 10.8 04/22/2019    HGB 14.7 04/22/2019    HCT 44.5 04/22/2019    MCV 90 04/22/2019     04/22/2019               This note has been dictated using voice recognition software. Any grammatical or context distortions are unintentional and inherent to the software.      Grover Oliveira M.D., F.A.C.C.  [facility]  [phone]

## 2021-05-31 VITALS — BODY MASS INDEX: 27.25 KG/M2 | HEIGHT: 69 IN | WEIGHT: 184 LBS

## 2021-05-31 VITALS — WEIGHT: 178 LBS | HEIGHT: 69 IN | BODY MASS INDEX: 26.36 KG/M2

## 2021-05-31 VITALS — HEIGHT: 71 IN | WEIGHT: 182.8 LBS | BODY MASS INDEX: 25.59 KG/M2

## 2021-06-01 VITALS — BODY MASS INDEX: 26.34 KG/M2 | WEIGHT: 184 LBS | HEIGHT: 70 IN

## 2021-06-01 VITALS — BODY MASS INDEX: 25.05 KG/M2 | HEIGHT: 70 IN | WEIGHT: 175 LBS

## 2021-06-01 VITALS — BODY MASS INDEX: 26.92 KG/M2 | WEIGHT: 188 LBS | HEIGHT: 70 IN

## 2021-06-01 VITALS — WEIGHT: 185 LBS | HEIGHT: 71 IN | BODY MASS INDEX: 25.9 KG/M2

## 2021-06-02 VITALS — WEIGHT: 184 LBS | BODY MASS INDEX: 26.34 KG/M2 | HEIGHT: 70 IN

## 2021-06-02 VITALS — WEIGHT: 189 LBS | HEIGHT: 70 IN | BODY MASS INDEX: 27.06 KG/M2

## 2021-06-02 VITALS — WEIGHT: 188 LBS | HEIGHT: 70 IN | BODY MASS INDEX: 26.92 KG/M2

## 2021-06-03 VITALS — BODY MASS INDEX: 26.92 KG/M2 | HEIGHT: 70 IN | WEIGHT: 188 LBS

## 2021-06-04 ENCOUNTER — OFFICE VISIT - HEALTHEAST (OUTPATIENT)
Dept: CARDIOLOGY | Facility: CLINIC | Age: 56
End: 2021-06-04

## 2021-06-04 VITALS
HEART RATE: 68 BPM | RESPIRATION RATE: 16 BRPM | SYSTOLIC BLOOD PRESSURE: 132 MMHG | WEIGHT: 197 LBS | BODY MASS INDEX: 28.2 KG/M2 | DIASTOLIC BLOOD PRESSURE: 80 MMHG | HEIGHT: 70 IN

## 2021-06-04 DIAGNOSIS — I50.20 HEART FAILURE WITH REDUCED EJECTION FRACTION (H): ICD-10-CM

## 2021-06-04 DIAGNOSIS — I42.8 NONISCHEMIC CARDIOMYOPATHY (H): ICD-10-CM

## 2021-06-04 LAB
ANION GAP SERPL CALCULATED.3IONS-SCNC: 8 MMOL/L (ref 5–18)
BNP SERPL-MCNC: 80 PG/ML (ref 0–45)
BUN SERPL-MCNC: 21 MG/DL (ref 8–22)
CALCIUM SERPL-MCNC: 8.6 MG/DL (ref 8.5–10.5)
CHLORIDE BLD-SCNC: 107 MMOL/L (ref 98–107)
CO2 SERPL-SCNC: 25 MMOL/L (ref 22–31)
CREAT SERPL-MCNC: 0.76 MG/DL (ref 0.7–1.3)
GFR SERPL CREATININE-BSD FRML MDRD: >60 ML/MIN/1.73M2
GLUCOSE BLD-MCNC: 103 MG/DL (ref 70–125)
POTASSIUM BLD-SCNC: 4.3 MMOL/L (ref 3.5–5)
SODIUM SERPL-SCNC: 140 MMOL/L (ref 136–145)

## 2021-06-04 RX ORDER — MOMETASONE FUROATE 200 UG/1
2 AEROSOL RESPIRATORY (INHALATION) 2 TIMES DAILY
Status: SHIPPED | COMMUNITY
Start: 2021-05-27

## 2021-06-04 ASSESSMENT — MIFFLIN-ST. JEOR: SCORE: 1742.11

## 2021-06-05 VITALS
WEIGHT: 187 LBS | HEART RATE: 80 BPM | SYSTOLIC BLOOD PRESSURE: 110 MMHG | HEIGHT: 69 IN | BODY MASS INDEX: 27.7 KG/M2 | RESPIRATION RATE: 16 BRPM | OXYGEN SATURATION: 98 % | DIASTOLIC BLOOD PRESSURE: 80 MMHG

## 2021-06-05 VITALS
WEIGHT: 194 LBS | DIASTOLIC BLOOD PRESSURE: 56 MMHG | OXYGEN SATURATION: 97 % | RESPIRATION RATE: 18 BRPM | BODY MASS INDEX: 28.65 KG/M2 | SYSTOLIC BLOOD PRESSURE: 94 MMHG | HEART RATE: 52 BPM

## 2021-06-05 VITALS
HEART RATE: 66 BPM | HEIGHT: 69 IN | RESPIRATION RATE: 16 BRPM | OXYGEN SATURATION: 91 % | DIASTOLIC BLOOD PRESSURE: 66 MMHG | BODY MASS INDEX: 26.81 KG/M2 | SYSTOLIC BLOOD PRESSURE: 108 MMHG | WEIGHT: 181 LBS

## 2021-06-05 VITALS
RESPIRATION RATE: 16 BRPM | WEIGHT: 191 LBS | HEIGHT: 69 IN | BODY MASS INDEX: 28.29 KG/M2 | SYSTOLIC BLOOD PRESSURE: 120 MMHG | DIASTOLIC BLOOD PRESSURE: 58 MMHG | HEART RATE: 80 BPM

## 2021-06-06 NOTE — PATIENT INSTRUCTIONS - HE
Matthew W Behr,    It was a pleasure to see you today at [facility].     My recommendations after this visit include:  - Please follow up with Dr Oliveira in 6 weeks   - Please follow up with Vane Martin in 3-4 months  - No changes to your medications    Vane Martin, CNP

## 2021-06-09 ENCOUNTER — COMMUNICATION - HEALTHEAST (OUTPATIENT)
Dept: CARDIOLOGY | Facility: CLINIC | Age: 56
End: 2021-06-09

## 2021-06-09 DIAGNOSIS — I50.9 ACUTE DECOMPENSATED HEART FAILURE (H): ICD-10-CM

## 2021-06-09 RX ORDER — FUROSEMIDE 20 MG
TABLET ORAL
Qty: 270 TABLET | Refills: 3 | Status: SHIPPED | OUTPATIENT
Start: 2021-06-09 | End: 2021-07-28

## 2021-06-15 NOTE — PROGRESS NOTES
Assessment/Plan:     1. Nonischemic cardiomyopathy with systolic dysfunction, NYHA class II: Matthew W Behr appears well compensated.  His only complaint today is fatigue.  He states his dyspnea on exertion and orthopnea have improved since hospitalization.  He is currently not weighing himself on a daily basis which I stressed the importance of.  He denies using amphetamines which was positive during hospitalization.  I increase his carvedilol to 6.25 mrem twice a day.  We discussed heart failure, following a low salt diet, monitoring weights, and heart failure treatment. He met with a heart failure nurse clinician to discuss heart failure management.  He has a very active job which requires heavy lifting.  I have given him a work note with work restrictions.  He continues to wear his LifeVest.  We discussed rechecking an echocardiogram in 2-1/2 months.  If ejection fraction is less than or equal to 35% we will have him follow-up in EP to discuss device implantation.      Heart failure treatment includes:  - Beta blocker therapy with carvedilol 6.25 mg twice a day  - ACEI therapy with enalapril 5 mg twice a day  - Aldosterone blocker therapy with spironolactone 12.5 mg daily.  BMP pending  - Diuretic therapy with furosemide 20 mg twice a day    2. Obstructive sleep apnea: His [place] sleepiness scale score was 7 and STOP bang score of 3. No need for sleep study.    Follow-up with Dr. Larios January 26 as scheduled and in the heart failure clinic in 5 weeks or sooner if needed    Subjective:     Matthew W Behr is seen at [facility] heart failure clinic today for post-hospitalization follow-up.  His step wife accompanies him today.  He was hospitalized at [facility] from December 31 to January 3, 2018 with shortness of breath.  His BNP was 1031 on admission.  He also had pneumonia which he was treated with antibiotics.  During hospitalization his pathology report showed positive amphetamines.  The most  recent evaluation of His ejection fraction was 25% from an  echo on 12/31/2017.  His echo also showed moderate to severe mitral regurgitation and moderate tricuspid regurgitation.  He had a CTA with a score of 0.  He was discharged with a LifeVest.  His past medical history is also significant for janel Parkinson white syndrome.     Since being discharged from the hospital, Antonio feels that he is improving.  His only complaint today is fatigue.  He states that his shortness of breath and orthopnea have improved since being in the hospital.  He denies lightheadedness, shortness of breath, dyspnea on exertion, orthopnea, PND, palpitations, chest pain, abdominal fullness/bloating and lower extremity edema.      Matthew W Behr s weight at discharge was 169 pounds.  He is not monitoring home weights.  He is trying to follow a low sodium diet.  He participates in regular physical activity including walking.      Medication reconciliation was done today.    Review of Systems:   General: WNL  Eyes: WNL  Ears/Nose/Throat: WNL  Lungs: Shortness of Breath  Heart: Shortness of Breath with activity  Stomach: WNL  Bladder: WNL  Muscle/Joints: WNL  Skin: WNL  Nervous System: WNL  Mental Health: WNL     Blood: WNL    Patient Active Problem List   Diagnosis     Acute respiratory failure with hypoxia     Chest pain, unspecified type     Leukocytosis, unspecified type     Mild intermittent asthma without complication     Wide-complex tachycardia     Heart failure with reduced ejection fraction     Nonischemic cardiomyopathy       Past Medical History:   Diagnosis Date     Asthma      Cellulitis     Left hand     Janel Parkinson White pattern seen on electrocardiogram        Past Surgical History:   Procedure Laterality Date     OTHER SURGICAL HISTORY  About 10 + years ago    Cardiac surgery for Janel Parkinson at [facility]       History reviewed. No pertinent family history.    Social History     Social History     Marital status:       Spouse name: N/A     Number of children: N/A     Years of education: N/A     Occupational History     Not on file.     Social History Main Topics     Smoking status: Never Smoker     Smokeless tobacco: Current User     Types: Chew     Alcohol use Yes      Comment: 5 beers per week     Drug use: No     Sexual activity: Not on file     Other Topics Concern     Not on file     Social History Narrative       Current Outpatient Prescriptions   Medication Sig Dispense Refill     amoxicillin-clavulanate (AUGMENTIN) 875-125 mg per tablet Take 1 tablet by mouth 2 (two) times a day for 10 days. 20 tablet 0     aspirin 81 MG EC tablet Take 1 tablet (81 mg total) by mouth daily.  0     enalapril (VASOTEC) 5 MG tablet Take 1 tablet (5 mg total) by mouth 2 (two) times a day. 60 tablet 0     furosemide (LASIX) 20 MG tablet Take 1 tablet (20 mg total) by mouth 2 (two) times a day at 9am and 6pm. 60 tablet 0     spironolactone (ALDACTONE) 25 MG tablet Take 0.5 tablets (12.5 mg total) by mouth daily after supper. 30 tablet 0     carvedilol (COREG) 6.25 MG tablet Take 1 tablet (6.25 mg total) by mouth 2 (two) times a day with meals. 180 tablet 3     No current facility-administered medications for this visit.        No Known Allergies    Objective:     Vitals:    01/10/18 0800   BP: 128/74   Pulse: 82   Resp: 16     Wt Readings from Last 3 Encounters:   01/10/18 178 lb (80.7 kg)   01/03/18 169 lb (76.7 kg)   06/16/16 179 lb (81.2 kg)       General Appearance:   Alert, cooperative and in no acute distress.   HEENT:  No scleral icterus; the mucous membranes were pink and moist.   Neck: JVP normal. No HJR   Chest: The spine was straight. The chest was symmetric.   Lungs:   Respirations unlabored; the lungs are clear to auscultation.   Cardiovascular:   Regular rhythm. S1 and S2 without murmur, clicks or rubs. Radial and posterior tibial pulses are intact and symmetrical.    Abdomen:  Soft, nontender, nondistended, bowel  sounds present   Extremities: No cyanosis or edema.   Skin: No xanthelasma.   Neurologic: Mood and affect are appropriate.         Lab Review   Lab Results   Component Value Date    CREATININE 0.91 01/03/2018    BUN 21 01/03/2018     01/03/2018    K 4.2 01/03/2018     01/03/2018    CO2 29 01/03/2018     Lab Results   Component Value Date    BNP 1096 (H) 01/01/2018     BNP (pg/mL)   Date Value   01/01/2018 1096 (H)   12/31/2017 1031 (H)     Creatinine (mg/dL)   Date Value   01/03/2018 0.91   01/02/2018 0.87   01/01/2018 0.91   12/31/2017 0.75       Cardiographics  Echocardiogram: 12/31/2017  Summary   1. The left ventricle is mildly enlarged. Left ventricular systolic performance is severely reduced. The ejection fraction is estimated to be 25%.   2. There is severe global reduction in left ventricular systolic performance.  3. There is moderate to severe mitral insufficiency.   4. There is mild-moderate, to perhaps moderate, tricuspid insufficiency.  5. There is mild right ventricular enlargement with moderate depression in left ventricular systolic performance.  6. There is moderate left atrial enlargement. There is mild right atrial enlargement.   7. Right ventricular systolic pressure relative to right atrial pressure is moderately increased.  The pulmonary artery pressure is estimated to be 50-55 mmHg plus right atrial pressure. In addition, the IVC appears dilated with decreased phasic variation in caval diameter consistent with elevated right atrial pressure.      CTA Coronary 1/3/2018  Interpretation Summary     The total Agatston calcium score is 0. A calcium score of zero places the individual in the lowest quartile when compared to an age and gender matched control group and implies a low risk of cardiac events in the next 10 years. (less than 1% per year).    No detectable atherosclerotic plaque or significant stenosis observed.    Significant misregistration artifact decreases ability to  interpret the examination the findings are most consistent with artifact rather than obstructive artery disease.    Mild dilatation of the proximal aorta at the level of the sinuses of Valsalva    Please see radiology interpretation for additional findings.               40 minutes were face to face spent with the patient with greater than 50% spent on education and counseling.      Vane Martin, [facility]   Heart Failure Clinic

## 2021-06-15 NOTE — PROGRESS NOTES
Patient and partner, Margie, seen in clinic for HF education s/p recent hospital discharge 1/3/18  Reviewed HF Binder that includes the  HF Sx Awareness & Action plan  handout and  A Stronger Pump  booklet and Weight log booklet highlighting :  __X_patient s type of heart failure _X__Na management in diet  __X_importance of daily wts  _X__Fluid Guidelines, if applicable  __X_medication review and importance of compliance     Instructed patient in signs and sx of heart failure, reiterated when to call clinic - reviewed HF hotline # [phone] and after hours call # [phone].  Majority of time was spent reviewing: Low Sodium diet. Pt does eat out about twice weekly and admits to high sodium diet- primarily snack foods and pickles. Discussed in detail shopping guide and ways to get around high sodium foods and alternatives to try. Handout with resources provided as well. Encouraged patient to research restaurants with low-sodium options. Pt will look up his favorite places and made adjustments accordingly. Margie will help patient with shopping and meal choices.  Patient verbalized understanding of HF discussion.  Plan for f/u with continued HF education reviewed.  Pt will f/up with Dr. Oliveira 1/26/18 and see Vane for HF video in 5 weeks. [place] [place]

## 2021-06-15 NOTE — PROGRESS NOTES
[facility] Heart Care Clinic Progress Note    Assessment:  1.  Nonischemic cardiomyopathy New [place] Heart Association class II.  New diagnosis within the past few months.  No observed coronary artery disease on CT angiography.  He is doing well clinically.  We talked about the importance of close follow-up and the reasons for the LifeVest.  We are going to plan follow-up laboratory studies today.  We discussed increasing the carvedilol to 9.375 mg twice daily for the next 7-10 days and if tolerating contacting us to increase to 12-1/2 mg twice daily.  I have given him a renewal prescription for enalapril and furosemide.  He was on a medicine called spironolactone but this is not listed currently.  He did have a mild elevation of potassium and will recheck his laboratory studies today.  We discussed that his blood work did demonstrate positive for methamphetamines and we talked about the importance of avoiding methamphetamines or other illicit drugs as it relates to cardiomyopathy.  He continues to deny any history of this.      Plan: Echocardiogram and follow-up.            Increase carvedilol to 9.375 mg twice daily            Follow-up in CHF clinic in 2-3 weeks.    1. Heart failure with reduced ejection fraction, NYHA class II  Basic metabolic panel    Magnesium    BNP(B-type Natriuretic Peptide)    Echo Complete    ECG Clinic - Today   2. CHF (congestive heart failure)  furosemide (LASIX) 20 MG tablet   3. Chronic systolic congestive heart failure  enalapril (VASOTEC) 5 MG tablet         An After Visit Summary was printed and given to the patient.    Subjective:    Matthew W Behr is a 52 y.o. male who returned for a planned  follow up visit.  We met in hospital consultation at the end of December 2017.  He came in with heart failure symptoms.  He was subsequently seen by my colleague Dr. Petit.  He had a CT angiogram that was somewhat technically challenging but no obvious obstructive coronary artery disease.   He has a LifeVest in place.  He was started on ACE inhibitor and carvedilol and reports feeling very well.  He has no complaints of dizziness, shortness of breath, orthopnea, PND, syncope or near syncope.  He has been walking good distances and reports that he feels back to the way he did before the onset of the symptoms.  We did discuss the importance of continuing with a low-salt diet and avoiding vigorous activity.  He would like to repeat the echocardiogram which I think is reasonable to reevaluate left ventricular systolic function.    We discussed the importance of his medications and the plan for up titration of carvedilol.    Review of Systems:   General: WNL  Eyes: WNL  Ears/Nose/Throat: WNL  Lungs: WNL  Heart: WNL  Stomach: WNL  Bladder: WNL  Muscle/Joints: WNL  Skin: WNL  Nervous System: WNL  Mental Health: WNL     Blood: WNL      Problem List:    Patient Active Problem List   Diagnosis     Acute respiratory failure with hypoxia     Chest pain, unspecified type     Leukocytosis, unspecified type     Mild intermittent asthma without complication     Wide-complex tachycardia     Heart failure with reduced ejection fraction     Nonischemic cardiomyopathy       Social History     Social History     Marital status:      Spouse name: N/A     Number of children: N/A     Years of education: N/A     Occupational History     Not on file.     Social History Main Topics     Smoking status: Never Smoker     Smokeless tobacco: Current User     Types: Chew     Alcohol use Yes      Comment: 5 beers per week     Drug use: No     Sexual activity: Not on file     Other Topics Concern     Not on file     Social History Narrative       No family history on file.    Current Outpatient Prescriptions   Medication Sig Dispense Refill     aspirin 81 MG EC tablet Take 1 tablet (81 mg total) by mouth daily.  0     carvedilol (COREG) 6.25 MG tablet Take 1 tablet (6.25 mg total) by mouth 2 (two) times a day with meals. 180  "tablet 3     enalapril (VASOTEC) 5 MG tablet Take 1 tablet (5 mg total) by mouth 2 (two) times a day. 180 tablet 5     furosemide (LASIX) 20 MG tablet Take 1 tablet (20 mg total) by mouth 2 (two) times a day at 9am and 6pm. 180 tablet 4     No current facility-administered medications for this visit.        Objective:     /72 (Patient Site: Left Arm, Patient Position: Sitting, Cuff Size: Adult Regular)  Pulse 76  Resp 16  Ht 5' 9\" (1.753 m)  Wt 184 lb (83.5 kg)  BMI 27.17 kg/m2  184 lb (83.5 kg)       Physical Exam:    GENERAL APPEARANCE: alert, no apparent distress  HEENT: no scleral icterus or xanthelasma  NECK: jugular venous pressure within normal limits  CHEST: symmetric, the lungs are clear to auscultation  CARDIOVASCULAR: regular rhythm without murmur, D4; no carotid bruits  Abdomen: No Organomegaly, masses, bruits, or tenderness. Bowels sounds are present      EXTREMITIES: no cyanosis, clubbing or edema    Cardiac Testing:  Summary      1. The left ventricle is mildly enlarged. Left ventricular systolic performance is severely reduced. The ejection fraction is estimated to be 25%.   2. There is severe global reduction in left ventricular systolic performance.  3. There is moderate to severe mitral insufficiency.   4. There is mild-moderate, to perhaps moderate, tricuspid insufficiency.  5. There is mild right ventricular enlargement with moderate depression in left ventricular systolic performance.  6. There is moderate left atrial enlargement. There is mild right atrial enlargement.   7. Right ventricular systolic pressure relative to right atrial pressure is moderately increased.  The pulmonary artery pressure is estimated to be 50-55 mmHg plus right atrial pressure. In addition, the IVC appears dilated with decreased phasic variation in caval diameter consistent with elevated right atrial pressure.     MUSE DIAGNOSIS    ECG 12 lead MUSE           Collected: 01/01/18 0037     Resulting lab: HE " MUSE     Value: Sinus rhythm with Premature atrial complexes   Right bundle branch block   Abnormal ECG   When compared with ECG of 01-JAN-2018 04:26,   No significant change was found   Confirmed by OSEI GARCIA MD LOC: ([id number]) on 1/2/2018 9:05:13 AM          Interpretation Summary        The total Agatston calcium score is 0. A calcium score of zero places the individual in the lowest quartile when compared to an age and gender matched control group and implies a low risk of cardiac events in the next 10 years. (less than 1% per year).    No detectable atherosclerotic plaque or significant stenosis observed.    Significant misregistration artifact decreases ability to interpret the examination the findings are most consistent with artifact rather than obstructive artery disease.    Mild dilatation of the proximal aorta at the level of the sinuses of Valsalva    Please see radiology interpretation for additional findings.       EKG today sinus rhythm, right bundle branch block, QTC is shorter than the previous one January 1, 2018      Lab Results:    Lab Results   Component Value Date     (L) 01/10/2018    K 5.5 (H) 01/10/2018    CL 98 01/10/2018    CO2 26 01/10/2018    BUN 29 (H) 01/10/2018    CREATININE 0.86 01/10/2018    CALCIUM 9.9 01/10/2018     Lab Results   Component Value Date    CHOL 145 01/01/2018    TRIG 83 01/01/2018    HDL 51 01/01/2018     BNP (pg/mL)   Date Value   01/01/2018 1096 (H)   12/31/2017 1031 (H)     Creatinine (mg/dL)   Date Value   01/10/2018 0.86   01/03/2018 0.91   01/02/2018 0.87   01/01/2018 0.91     LDL Calculated (mg/dL)   Date Value   01/01/2018 77     Lab Results   Component Value Date    WBC 16.3 (H) 01/03/2018    HGB 16.3 01/03/2018    HCT 47.4 01/03/2018    MCV 86 01/03/2018     01/03/2018               This note has been dictated using voice recognition software. Any grammatical or context distortions are unintentional and inherent to the  software.      Grover Oliveira M.D., F.A.C.Critical [facility] hospital  [phone]

## 2021-06-16 PROBLEM — I50.9 ACUTE DECOMPENSATED HEART FAILURE (H): Status: ACTIVE | Noted: 2021-03-17

## 2021-06-16 PROBLEM — I42.8 NONISCHEMIC CARDIOMYOPATHY (H): Status: ACTIVE | Noted: 2018-01-10

## 2021-06-16 PROBLEM — I50.9 ACUTE CHF (H): Status: ACTIVE | Noted: 2019-04-22

## 2021-06-16 PROBLEM — R07.9 CHEST PAIN WITH NORMAL CORONARY ANGIOGRAPHY: Status: ACTIVE | Noted: 2018-01-03

## 2021-06-16 PROBLEM — F15.90 AMPHETAMINE MISUSE: Status: ACTIVE | Noted: 2017-12-31

## 2021-06-16 PROBLEM — I50.20 HEART FAILURE WITH REDUCED EJECTION FRACTION (H): Status: ACTIVE | Noted: 2018-01-10

## 2021-06-16 NOTE — PATIENT INSTRUCTIONS - HE
Matthew W Behr,    It was a pleasure to see you today at [facility].     My recommendations after this visit include:  - Please follow up with Dr Oliveira April 26   - Please follow up with Vane Martin in 5 weeks  - I have checked labs and will contact you with results  - Please increase carvedilol to 18.75 mg (1.5 tablets) in the morning and 12.5 mg in the afternoon    Vane Martin, CNP

## 2021-06-16 NOTE — PROGRESS NOTES
Patient seen in clinic for continued HF education.  Patient viewed 'What is heart Failure' video which covers risk factors, symptoms, medications, Na and fluid guidelines, balancing activity and rest, and daily monitoring of symptoms.    Addressed patients questions/concerns- will continue to reinforce HF education with future patient interactions.  Pt did not have any questions following the video- he will f/up with YOHANA 3/9 and MDG 4/23. -[facility] – [place]

## 2021-06-16 NOTE — PATIENT INSTRUCTIONS - HE
Matthew W Behr,    It was a pleasure to see you today at [facility].     My recommendations after this visit include:  - Please follow up with Dr Oliveira April 26   - Please follow up with Vane Martin in 2 weeks  - I have checked labs and will contact you with results  - Continue current medications    Vane Martin, CNP

## 2021-06-16 NOTE — PROGRESS NOTES
Assessment/Plan:     1. Nonischemic cardiomyopathy with systolic dysfunction, NYHA class II: Matthew W Behr appears well compensated.  He continues to have fatigue.  He has mild dyspnea on exertion.  Weights have remained stable.  He continues to follow a low-sodium diet.  I increase his carvedilol to 15.725 mg twice a day.  He is wondering if the influenza A affected his echocardiogram results.  I said this is very unlikely but he is wanting to recheck this.  I will discuss this with Dr. Oliveira.  I will have him follow-up with EP to discuss possible device.  He continues to wear a LifeVest.       Heart failure treatment includes:  - Beta blocker therapy with carvedilol 15.725 mg twice a day  - ACEI therapy with enalapril 5 mg twice a day  - Diuretic therapy with furosemide 20 mg twice a day    Follow-up in the heart failure clinic in 3 weeks and with Dr. Oliveira at the end of March    Subjective:     Matthew W Behr is seen at [facility] heart failure clinic today for continued follow-up.  He follows up for nonischemic cardiomyopathy with systolic dysfunction.  He had an echocardiogram February 6, 2018 which showed an ejection fraction of 31% along with moderate mitral regurgitation.  He was diagnosed with influenza A on February 3rd and is doing well.  He continues to have a productive cough but denies any fevers or chills.  He has a past medical history significant for Janel Parkinson White syndrome.    Today, he comes in with continued fatigue.  He states he has had shortness of breath due to having influenza A.  He is still coughing that is productive.  He denies any fevers or chills.  He denies any orthopnea or PND.  He denies any dizziness or lightheadedness.  He denies lightheadedness, shortness of breath, orthopnea, PND, palpitations, chest pain, abdominal fullness/bloating and lower extremity edema.      He is monitoring home weights which are stable around 180 pounds.  He is following a low sodium  diet.  He participates in regular physical activity including walking.      Review of Systems:   General: WNL  Eyes: WNL  Ears/Nose/Throat: WNL  Lungs: Shortness of Breath  Heart: WNL  Stomach: WNL  Bladder: WNL  Muscle/Joints: WNL  Skin: WNL  Nervous System: WNL  Mental Health: WNL     Blood: WNL     Patient Active Problem List   Diagnosis     Mild intermittent asthma without complication     Wide-complex tachycardia     Heart failure with reduced ejection fraction     Nonischemic cardiomyopathy     Amphetamine urine screen positive       Past Medical History:   Diagnosis Date     Acute respiratory failure with hypoxia      Amphetamine urine screen positive 12/31/2017     Asthma      Cellulitis     Left hand     Chest pain with normal CT coronary angiography 1/3/2018     Nonischemic cardiomyopathy 1/10/2018     Janel Parkinson White pattern seen on electrocardiogram        Past Surgical History:   Procedure Laterality Date     OTHER SURGICAL HISTORY  About 10 + years ago    Cardiac surgery for Janel Parkinson at [facility]       History reviewed. No pertinent family history.    Social History     Social History     Marital status:      Spouse name: N/A     Number of children: N/A     Years of education: N/A     Occupational History      [employer]     Social History Main Topics     Smoking status: Never Smoker     Smokeless tobacco: Current User     Types: Chew     Alcohol use Yes      Comment: 5 beers per week     Drug use: No     Sexual activity: Not on file     Other Topics Concern     Not on file     Social History Narrative       Current Outpatient Prescriptions   Medication Sig Dispense Refill     aspirin 81 MG EC tablet Take 1 tablet (81 mg total) by mouth daily.  0     carvedilol (COREG) 6.25 MG tablet Take 2 tablets (12.5 mg total) by mouth 2 (two) times a day with meals. 180 tablet 3     enalapril (VASOTEC) 5 MG tablet Take 1 tablet (5 mg total) by mouth 2 (two) times a day. 180 tablet 2      furosemide (LASIX) 20 MG tablet Take 1 tablet (20 mg total) by mouth 2 (two) times a day at 9am and 6pm. 180 tablet 2     VENTOLIN HFA 90 mcg/actuation inhaler Take 2 puffs by mouth every 4 (four) hours. 4-6 hours  0     carvedilol (COREG) 3.125 MG tablet Take 1 tablet (3.125 mg total) by mouth 2 (two) times a day with meals. 90 tablet 0     No current facility-administered medications for this visit.        No Known Allergies    Objective:     Vitals:    02/16/18 0804   BP: 110/78   Pulse: 64   Resp: 16     Wt Readings from Last 3 Encounters:   02/16/18 182 lb 12.8 oz (82.9 kg)   02/06/18 178 lb (80.7 kg)   02/03/18 178 lb (80.7 kg)       General Appearance:   Alert, cooperative and in no acute distress.   HEENT:  No scleral icterus; the mucous membranes were pink and moist.   Neck: JVP normal. No HJR.   Chest: The spine was straight. The chest was symmetric.   Lungs:   Respirations unlabored; the lungs are clear to auscultation.   Cardiovascular:   Regular rhythm. S1 and S2 without murmur, clicks or rubs. Radial and posterior tibial pulses are intact and symmetrical. Wearing LifeVest   Abdomen:  Soft, nontender, nondistended, bowel sounds present   Extremities: No cyanosis, clubbing, or edema.   Skin: No xanthelasma.   Neurologic: Mood and affect are appropriate.         Lab Review   Lab Results   Component Value Date    CREATININE 0.84 01/26/2018    BUN 19 01/26/2018     01/26/2018    K 4.5 01/26/2018     01/26/2018    CO2 28 01/26/2018     Lab Results   Component Value Date     (H) 01/26/2018     BNP (pg/mL)   Date Value   01/26/2018 188 (H)   01/01/2018 1096 (H)   12/31/2017 1031 (H)     Creatinine (mg/dL)   Date Value   01/26/2018 0.84   01/10/2018 0.86   01/03/2018 0.91   01/02/2018 0.87       Cardiographics  Echocardiogram: 2/6/2018  Summary     When compared to the previous study dated 12/31/2017, left ventricular size is slightly smaller. Other findings are similar including TR gradient  which was remeasured by the undersigned and was 36 mm Hg maximum on prior study.    The calculated left ventricular ejection fraction is 31%.    Moderate mitral regurgitation.           25 minutes were spent face to face with the patient with greater than 50% spent on education and counseling.      Vane Martin, [facility]   Heart Failure Clinic

## 2021-06-16 NOTE — PROGRESS NOTES
Orders placed per request from [facility].  -ra    From: Grover Oliveira MD     Sent: 2/19/2018   8:42 PM       To: Paradise Abrams RN, Vane Martin CNP  Subject: FW: echo results                                 Would suggest cardia mri instead of echo which we can arrange as soon as possible to further define etiology can we arrange? mdg  ----- Message -----     From: Vane Martin CNP     Sent: 2/16/2018  10:26 AM       To: Grover Oliveira MD  Subject: echo results                                     Hi Dr Oliveira,    I saw Dwain today for fu and he was wondering if his recent echo results could have been effected due to him having Influenza A when the echo was done. I told him very unlikely but he thinks his echo should be rechecked. Could Influenza effect the results of his echo? I set him up with EP for possible device. Thank you.    Vane

## 2021-06-16 NOTE — PROGRESS NOTES
From: Grover Oliveira MD     Sent: 3/25/2018   8:44 PM       To: Paradise Abrams RN    I called him and encouraged him to make an appt for fu with np, he needs up titration od coreg  ty mdg    Message sent to scheduling 3/26/18. -kleber

## 2021-06-16 NOTE — PATIENT INSTRUCTIONS - HE
Matthew W Behr,    It was a pleasure to see you today at [facility].     My recommendations after this visit include:  - Please follow up with Dr Oliveira in 4-6 weeks   - Please follow up with Vane Martin in 2 weeks      Vane Martin, CNP

## 2021-06-17 NOTE — PROGRESS NOTES
Order placed, patient notified.  -[facility]     Notes Recorded by Grover Oliveira MD on 4/19/2018 at 9:46 PM  I reviewed echo, ef is better, the only issue on the echo was heart rate in the high 40s to 50s, would like him to wear a holter for 24 hours to look at heart rate  mdg

## 2021-06-17 NOTE — PROGRESS NOTES
----- Message -----  From: Grover Oliveira MD  Sent: 4/26/2021   2:29 PM CDT  To: Paradise Abrams RN    Please let him know that the BNP is much better down to 99 from 553 1 month ago.  Other labs look good.  He can try decreasing the furosemide from 40 mg daily to 20 mg daily monitoring for increased shortness of breath, leg swelling, or weight gain more than 3 to 5 pounds in the few day period of time and to update us.  We can make additional decisions regarding the furosemide pending the echocardiogram.carolni mdg    === Furosemide dose updated.  -kleber

## 2021-06-17 NOTE — PROGRESS NOTES
Assessment/Plan:     1. Nonischemic cardiomyopathy with systolic dysfunction, NYHA class II: Matthew W Behr appears well compensated.  No signs of fluid retention.  His weights have remained stable.  He continues to try to follow a low-sodium diet.  No changes to her carvedilol as heart rate is 50.  He denies any dizziness or lightheadedness.  I increased his enalapril to 10 mg twice a day.  BMP pending.  He is in need of reassessment of his ejection fraction for work by May 1.  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  I encouraged him to call his insurance company to make sure this is covered before we follow through with that.      Heart failure treatment includes:  - Beta blocker therapy with carvedilol 15.725 mg twice a day  - ACEI therapy with enalapril 10 mg twice a day  - Diuretic therapy with furosemide 20 mg twice a day    Follow-up with Dr. Oliveira at his next available appointment and in the heart failure clinic in 3 weeks    Subjective:     Matthew W Behr is seen at [facility] heart failure clinic today for continued follow-up.  He follows up for nonischemic cardiomyopathy with systolic dysfunction.  He had a cardiac MRI March 1, 2018 which showed an improved ejection fraction of 37%.  His previous ejection fraction was 25% in December 2017.  He has a past medical history significant for Janel Parkinson White syndrome.    During the last clinic visit, I increased his carvedilol to 15.725 mg twice a day.  He states he tolerated this well.  He denies any dizziness or lightheadedness.  He continues to have mild fatigue and mild dyspnea on exertion.  He denies any orthopnea or PND.  He denies any lower extremity edema.  He denies lightheadedness, shortness of breath, orthopnea, PND, palpitations, chest pain, abdominal fullness/bloating and lower extremity edema.      He is monitoring home weights which are stable between 180-185 pounds.  He is following a low sodium  diet. He is active for his work.     Review of Systems:   General: WNL  Eyes: WNL  Ears/Nose/Throat: WNL  Lungs: WNL  Heart: WNL  Stomach: WNL  Bladder: WNL  Muscle/Joints: WNL  Skin: WNL  Nervous System: WNL  Mental Health: WNL     Blood: WNL     Patient Active Problem List   Diagnosis     Mild intermittent asthma without complication     Wide-complex tachycardia     Heart failure with reduced ejection fraction     Nonischemic cardiomyopathy     Amphetamine urine screen positive       Past Medical History:   Diagnosis Date     Acute respiratory failure with hypoxia      Amphetamine urine screen positive 12/31/2017     Asthma      Cellulitis     Left hand     Chest pain with normal CT coronary angiography 1/3/2018     Nonischemic cardiomyopathy 1/10/2018     Janel Parkinson White pattern seen on electrocardiogram        Past Surgical History:   Procedure Laterality Date     OTHER SURGICAL HISTORY  About 10 + years ago    Cardiac surgery for Janel Parkinson at [facility]       No family history on file.    Social History     Social History     Marital status:      Spouse name: N/A     Number of children: N/A     Years of education: N/A     Occupational History      [employer]     Social History Main Topics     Smoking status: Never Smoker     Smokeless tobacco: Current User     Types: Chew     Alcohol use Yes      Comment: 5 beers per week     Drug use: No     Sexual activity: Not on file     Other Topics Concern     Not on file     Social History Narrative       Current Outpatient Prescriptions   Medication Sig Dispense Refill     aspirin 81 MG EC tablet Take 1 tablet (81 mg total) by mouth daily.  0     carvedilol (COREG) 3.125 MG tablet Take 1 tablet (3.125 mg total) by mouth 2 (two) times a day with meals. 90 tablet 2     carvedilol (COREG) 6.25 MG tablet Take 2 tablets (12.5 mg total) by mouth 2 (two) times a day with meals. 360 tablet 2     furosemide (LASIX) 20 MG tablet Take 1 tablet (20 mg total) by  mouth 2 (two) times a day at 9am and 6pm. 180 tablet 2     VENTOLIN HFA 90 mcg/actuation inhaler Take 2 puffs by mouth every 4 (four) hours. 4-6 hours  0     enalapril (VASOTEC) 10 MG tablet Take 1 tablet (10 mg total) by mouth 2 (two) times a day. 180 tablet 3     No current facility-administered medications for this visit.        No Known Allergies    Objective:     Vitals:    03/30/18 0801   BP: 110/80   Pulse: (!) 50   Resp: 12   SpO2: 98%     Wt Readings from Last 3 Encounters:   03/30/18 185 lb (83.9 kg)   02/16/18 182 lb 12.8 oz (82.9 kg)   02/06/18 178 lb (80.7 kg)       General Appearance:   Alert, cooperative and in no acute distress.   HEENT:  No scleral icterus; the mucous membranes were pink and moist.   Neck: JVP normal. No HJR.   Chest: The spine was straight. The chest was symmetric.   Lungs:   Respirations unlabored; the lungs are clear to auscultation.   Cardiovascular:   Regular rhythm. S1 and S2 without murmur, clicks or rubs. Radial and posterior tibial pulses are intact and symmetrical.    Abdomen:  Soft, nontender, nondistended, bowel sounds present   Extremities: No cyanosis, clubbing, or edema.   Skin: No xanthelasma.   Neurologic: Mood and affect are appropriate.         Lab Review   Lab Results   Component Value Date    CREATININE 0.84 01/26/2018    BUN 19 01/26/2018     01/26/2018    K 4.5 01/26/2018     01/26/2018    CO2 28 01/26/2018     Lab Results   Component Value Date     (H) 01/26/2018     BNP (pg/mL)   Date Value   01/26/2018 188 (H)   01/01/2018 1096 (H)   12/31/2017 1031 (H)     Creatinine (mg/dL)   Date Value   01/26/2018 0.84   01/10/2018 0.86   01/03/2018 0.91   01/02/2018 0.87       Cardiographics  Cardiac MRI 3/1/2018  IMPRESSIONS:   1. Mildly enlarged left ventricular size with normal wall thickness. There is global hypokinesis, no regional wall motion abnormality. The quantified left ventricular ejection fraction is 37%. No myocardial scar is  identified.   2. Normal right ventricular size function.   3. No obvious valvular disease.      25 minutes were spent face to face with the patient with greater than 50% spent on education and counseling.      Vane Martin, [facility]   Heart Failure Clinic

## 2021-06-17 NOTE — PATIENT INSTRUCTIONS - HE
Matthew W Behr,    It was a pleasure to see you today at [facility].     My recommendations after this visit include:  - Please follow up with Dr Oliveira in 6 weeks   - Please follow up with Vane Martin in 3 weeks  - Echocardiogram scheduled today  - Increase carvedilol to 18.75 mg twice a day    Vane Martin, CNP

## 2021-06-17 NOTE — PATIENT INSTRUCTIONS - HE
Please change the Enalapril to bedtime and update me with how the lightheaded symptoms upon standing.You can write on my chart or call my nurse Paradise at [phone].I will be in touch with the test results.

## 2021-06-17 NOTE — TELEPHONE ENCOUNTER
Pt LVM x2 - couldn't get echo until 5/14/21 and is asking if he can go back to work before then, if not full time, is part time ok?    Dr Oliveira - what do you recommend?  -kleber

## 2021-06-18 NOTE — PROGRESS NOTES
[facility] Progress Note    Assessment:  1.  Nonischemic cardiomyopathy functional class I.  He continues to have significant improvement in his symptoms.  Echocardiogram from April 2018 demonstrated ejection fraction that had improved to 40-45%.  He is uptitrated the carvedilol to 18.375 mg twice daily without complaints of dizziness or lightheadedness.  He has had some mild bradycardic tendency.  We are going to plan a Holter monitor.  He has had a nice response to the current combination of medications.  Consideration could be given to transitioning to Entresto given that he is functional class I and some difficulty with him following up I think at this time further up titration of carvedilol would be ideal.  Going to plan a Holter monitor.  I have asked him to decrease the furosemide to 20 mg once daily and update us with how he is doing with this change.        Plan: Holter monitor             Decrease furosemide to 20 mg once daily            Follow up 3-4 months  1. Cardiomyopathy  Holter Monitor         An After Visit Summary was printed and given to the patient.    Subjective:    Matthew W Behr is a 52 y.o. male who returned for a planned  follow up visit.  He reports that he is feeling very well.  He has had no complaints of chest pain, dizziness, shortness of breath, orthopnea or PND.  Tells me he has been out rollerblading without difficulty.  He is uptitrated the carvedilol to 18.375 mg twice daily without difficulty.  He does trend mildly low blood pressure and mildly low heart rate.  I have asked him to utilize a Holter monitor.  His CT angiogram that was technically challenging but no obvious obstructive coronary artery disease.  His ejection fraction has improved to 40-45% on the most recent echocardiogram from April 2018.  I reviewed the findings and discussed nonischemic cardiomyopathy in detail.  We discussed that ejection fraction is now above the 35% cut off but I did want to  pursue a Holter monitor.    Review of Systems:   General: WNL  Eyes: WNL  Ears/Nose/Throat: WNL  Lungs: WNL  Heart: WNL  Stomach: WNL  Bladder: WNL  Muscle/Joints: WNL  Skin: WNL  Nervous System: WNL  Mental Health: WNL     Blood: WNL      Problem List:    Patient Active Problem List   Diagnosis     Mild intermittent asthma without complication     Wide-complex tachycardia     Heart failure with reduced ejection fraction     Nonischemic cardiomyopathy     Amphetamine urine screen positive       Social History     Social History     Marital status:      Spouse name: N/A     Number of children: N/A     Years of education: N/A     Occupational History      [employer]     Social History Main Topics     Smoking status: Never Smoker     Smokeless tobacco: Current User     Types: Chew     Alcohol use Yes      Comment: 5 beers per week     Drug use: No     Sexual activity: Not on file     Other Topics Concern     Not on file     Social History Narrative       No family history on file.    Current Outpatient Prescriptions   Medication Sig Dispense Refill     aspirin 81 MG EC tablet Take 1 tablet (81 mg total) by mouth daily.  0     carvedilol (COREG) 12.5 MG tablet Take one tablet with one 6.25 mg tablet twice a day for a total of 18.75 mg twice a day 180 tablet 3     carvedilol (COREG) 6.25 MG tablet Take one tablet with one 12.5 mg tablet twice a day for a total of 18.75 mg twice a day 180 tablet 3     enalapril (VASOTEC) 10 MG tablet Take 1 tablet (10 mg total) by mouth 2 (two) times a day. 180 tablet 3     furosemide (LASIX) 20 MG tablet Take 1 tablet (20 mg total) by mouth 2 (two) times a day at 9am and 6pm. 180 tablet 2     VENTOLIN HFA 90 mcg/actuation inhaler Take 2 puffs by mouth every 4 (four) hours. 4-6 hours  0     No current facility-administered medications for this visit.        Objective:     /64 (Patient Site: Right Arm, Patient Position: Sitting, Cuff Size: Adult Regular)  Pulse 64  Resp 16  " Ht 5' 10\" (1.778 m)  Wt 184 lb (83.5 kg)  BMI 26.4 kg/m2  184 lb (83.5 kg)       Physical Exam:    GENERAL APPEARANCE: alert, no apparent distress  HEENT: no scleral icterus or xanthelasma  NECK: jugular venous pressure is within normal limits.  CHEST: symmetric, the lungs are clear to auscultation  CARDIOVASCULAR: regular rhythm without murmur, S4; no carotid bruits  Abdomen: No Organomegaly, masses, bruits, or tenderness. Bowels sounds are present      EXTREMITIES: no cyanosis, clubbing or edema    Cardiac Testing:  Reviewed By      Grover Oliveira MD on 2018  9:46 PM       Echo Complete   Order# [id number]    Reading physician: Nelly Petit MD   Ordering physician: Vane Martin CNP   Study date: 18         Patient Information      Patient Name MRN Sex              Age     Behr, Matthew W [id number] Male 1965 (52 y.o.)       Indications      Dx: Nonischemic cardiomyopathy [I42.8 (ICD-10-CM)]       Summary      1. The left ventricle is normal in size. Left ventricular systolic performance is mild to moderately reduced. The ejection fraction is estimated to be 40-45%.   2. There is mild to moderate global reduction in left ventricular systolic performance.  3. No significant valvular heart disease is identified on this study.   4. Normal right ventricular size and systolic performance.      When compared to the prior real-time echocardiogram dated 2018, there has been a mild improvement in left ventricular systolic performance.         Indications      Nonischemic cardiomyopathy with systolic dysfunction, dyspnea on exertion,; Cardiomyopathy; Heart failure; Dyspnea, cardiac origin suspected; Valve disorders     Dx: Cardiomyopathy [I42.9 (ICD-10-CM)]; Heart failure [I50.9 (ICD-10-CM)]; LUQUE (dyspnea on exertion) [R06.09 (ICD-10-CM)]; Fatigue [R53.83 (ICD-10-CM)]       Technical Details      TECHNIQUE: ECG gated MRI of the heart with and without intravenous contrast. " (17 ml Gadavist of IV gadolinium) This was performed on a Siemens Area 1.5 Eleonora MRI scanner.       Technical Quality      Axial HASTE and steady state free precession images, multiplanar steady-state free procession cine images, multiplanar gradient echo cine images, T1 and T2 Turbo spin-echo images with and without Fat saturation suppression, Grid tagging images, rest or first pass contrast perfusion images, and delayed contrast-enhanced phase sensitive inversion recovery images were obtained. Physical myocardial measurements were obtained by the physician using [employer] workstation.    FINDINGS:  LEFT VENTRICLE: Left ventricle is mildly enlarged with normal wall thickness. There is global hypokinesis, no regional wall motion abnormality. There is no intraventricular mass or thrombus. No myocardial scar is identified.     RIGHT VENTRICLE: Right ventricle is normal size with normal right ventricular systolic function. No aneurysmal wall or regional wall motion abnormalities.     ATRIA: Normal left atrial size. Normal right atrial size. No atrial septal defect.  AORTIC VALVE: Tricuspid valve. No aortic insufficiency. No significant aortic stenosis.  MITRAL VALVE: No obvious mitral valve structure abnormality or mitral valve stenosis. No mitral regurgitation.  TRICUSPID VALVE: Normal structure. No stenosis. No tricuspid regurgitation.   PULMONIC VALVE: No significant pulmonic stenosis or regurgitation.   PERICARDIUM: No pericardial effusion.   CORONARY ARTERIES: Normal right coronary and left coronary artery origin.   AORTA: Normal thoracic size and its major branches.       EXTRACARDIAC STRUCTURES: Please refer to radiology interpretation for extracardiac structures    IMPRESSIONS:   1. Mildly enlarged left ventricular size with normal wall thickness. There is global hypokinesis, no regional wall motion abnormality. The quantified left ventricular ejection fraction is 37%. No myocardial scar is identified.   2. Normal  right ventricular size function.   3. No obvious valvular disease.           DIAGNOSIS    ECG Clinic - Today           Collected: 01/26/18 0823     Resulting lab: HE MUSE     Value: Normal sinus rhythm   Right bundle branch block   Minimal voltage criteria for LVH, may be normal variant   Abnormal ECG   When compared with ECG of 01-JAN-2018 17:08,   Premature atrial complexes are no longer Present   QT has shortened   Confirmed by MAYRA CORRAL MD LOC:WW ([id number]) on 1/26/2018 4:11:36 PM        Lab Results:    Lab Results   Component Value Date     04/19/2018    K 4.4 04/19/2018     04/19/2018    CO2 27 04/19/2018    BUN 21 04/19/2018    CREATININE 0.80 04/19/2018    CALCIUM 9.4 04/19/2018     Lab Results   Component Value Date    CHOL 145 01/01/2018    TRIG 83 01/01/2018    HDL 51 01/01/2018     BNP (pg/mL)   Date Value   01/26/2018 188 (H)   01/01/2018 1096 (H)   12/31/2017 1031 (H)     Creatinine (mg/dL)   Date Value   04/19/2018 0.80   03/30/2018 0.76   01/26/2018 0.84   01/10/2018 0.86     LDL Calculated (mg/dL)   Date Value   01/01/2018 77     Lab Results   Component Value Date    WBC 16.3 (H) 01/03/2018    HGB 16.3 01/03/2018    HCT 47.4 01/03/2018    MCV 86 01/03/2018     01/03/2018               This note has been dictated using voice recognition software. Any grammatical or context distortions are unintentional and inherent to the software.      Grover Oliveira M.D., F.A.C.C.  [facility] [place]  [phone]

## 2021-06-20 ENCOUNTER — HEALTH MAINTENANCE LETTER (OUTPATIENT)
Age: 56
End: 2021-06-20

## 2021-06-20 NOTE — PROGRESS NOTES
[facility] Progress Note    Assessment:  1.  Nonischemic cardiac myopathy functional class I.  He has had significant improvement in his symptoms since his initial presentation in December 2017.  Most recent echocardiogram is from April 2018 where his ejection fraction was said to be 40-45%.  He has been exercising regularly and has been able to come off his furosemide.  We talked about being reasonable in his exercise pursuits not overdoing weightlifting.  We are going to plan follow-up echocardiography and then make additional recommendations as it relates to his medications.  We did discuss the importance of taking both the Vasotec and carvedilol.  Further recommendations pending the outcome of the echocardiogram.  Holter monitor from May 2018 find sinus rhythm with IVCD without clinically significant pauses.  There were 8 couplets.  Rare single ventricular premature beats.      Plan: Follow-up in 3 months with plans as outlined above.    1. Secondary cardiomyopathy (H)  Echo Complete    Basic metabolic panel    BNP(B-type Natriuretic Peptide)    HM2(CBC w/o Differential)    CANCELED: Basic metabolic panel    CANCELED: BNP(B-type Natriuretic Peptide)    CANCELED: HM2(CBC w/o Differential)    CANCELED: Basic metabolic panel    CANCELED: BNP(B-type Natriuretic Peptide)    CANCELED: HM2(CBC w/o Differential)         An After Visit Summary was printed and given to the patient.    Subjective:    Matthew W Behr is a 53 y.o. male who returned for a planned  follow up visit.  He reports that he has been feeling well.  He reports that he has been exercising regularly and has had no specific cardiovascular complaints.  He specifically denies chest pain, shortness of breath, dizziness, lightheadedness, syncope or near syncope.    Reports that he has been taking his medications although perhaps misses doses periodically.  He has a history of nonischemic cardiomyopathy CT scan misregistration no obvious  obstructive coronary artery disease January 2018.  He had total calcium score of 0.    Review of Systems:   General: WNL  Eyes: WNL  Ears/Nose/Throat: WNL  Lungs: WNL  Heart: WNL  Stomach: WNL  Bladder: WNL  Muscle/Joints: WNL  Skin: WNL  Nervous System: WNL  Mental Health: WNL     Blood: WNL      Problem List:    Patient Active Problem List   Diagnosis     Mild intermittent asthma without complication     Wide-complex tachycardia (H)     Heart failure with reduced ejection fraction (H)     Nonischemic cardiomyopathy (H)     Amphetamine urine screen positive       Social History     Social History     Marital status:      Spouse name: N/A     Number of children: N/A     Years of education: N/A     Occupational History      [employer]     Social History Main Topics     Smoking status: Never Smoker     Smokeless tobacco: Current User     Types: Chew     Alcohol use Yes      Comment: 5 beers per week     Drug use: No     Sexual activity: Not on file     Other Topics Concern     Not on file     Social History Narrative       No family history on file.    Current Outpatient Prescriptions   Medication Sig Dispense Refill     aspirin 81 MG EC tablet Take 1 tablet (81 mg total) by mouth daily.  0     carvedilol (COREG) 12.5 MG tablet Take one tablet with one 6.25 mg tablet twice a day for a total of 18.75 mg twice a day 180 tablet 3     carvedilol (COREG) 6.25 MG tablet Take one tablet with one 12.5 mg tablet twice a day for a total of 18.75 mg twice a day 180 tablet 3     enalapril (VASOTEC) 10 MG tablet Take 1 tablet (10 mg total) by mouth 2 (two) times a day. 180 tablet 3     VENTOLIN HFA 90 mcg/actuation inhaler Take 2 puffs by mouth every 4 (four) hours. 4-6 hours  0     furosemide (LASIX) 20 MG tablet Take 1 tablet (20 mg total) by mouth 2 (two) times a day at 9am and 6pm. 180 tablet 2     No current facility-administered medications for this visit.        Objective:     /74 (Patient Site: Left Arm,  "Patient Position: Sitting, Cuff Size: Adult Regular)  Pulse 72  Resp 16  Ht 5' 10\" (1.778 m)  Wt 175 lb (79.4 kg)  BMI 25.11 kg/m2  175 lb (79.4 kg)       Physical Exam:    GENERAL APPEARANCE: alert, no apparent distress  HEENT: no scleral icterus or xanthelasma  NECK: jugular venous pressure within normal limits  CHEST: symmetric, the lungs are clear to auscultation  CARDIOVASCULAR: regular rhythm without murmur, click, or gallop; no carotid bruits  Abdomen: No Organomegaly, masses, bruits, or tenderness. Bowels sounds are present      EXTREMITIES: no cyanosis, clubbing or edema    Cardiac Testing:  Patient Information      Patient Name MRN Sex              Age     Behr, Matthew W [id number] Male 1965 (53 y.o.)       Indications      cardiomyopathy; Heart failure; Newly diagnosed cardiomyopathy       Interpretation Summary        The total Agatston calcium score is 0. A calcium score of zero places the individual in the lowest quartile when compared to an age and gender matched control group and implies a low risk of cardiac events in the next 10 years. (less than 1% per year).    No detectable atherosclerotic plaque or significant stenosis observed.    Significant misregistration artifact decreases ability to interpret the examination the findings are most consistent with artifact rather than obstructive artery disease.    Mild dilatation of the proximal aorta at the level of the sinuses of Valsalva    Please see radiology interpretation for additional findings.   2018 08:23:08 HE JOES/JOHNS/DANITA/GRAEME  Normal sinus rhythm  Right bundle branch block  Minimal voltage criteria for LVH, may be normal variant  Abnormal ECG  When compared with ECG of 2018 17:08,  Premature atrial complexes are no longer Present  QT has shortened  Confirmed by MAYRA CORRAL MD LOC:GRAEME ([id number]) on 2018 4:11:36 PM  25mm/s 10mm/mV 150Hz 8.0 SP2 12SL 239 IRIS: 2  Referred by: Juan Ha / MD: MAYRA LOC:WW " SHAYNA GARVNI  Conclusion         HOLTER MONITOR     INTERPRETATION:  Predominant rhythm is sinus rhythm with rates ranging from 38 beats  per minute to 124 beats per minute.  An IVCD, probably right bundle-branch block is  present throughout the monitoring period.  Sinus arrhythmia was noted at night.   There were no clinically significant pauses.  Occasional atrial premature beats were  noted, 352 over 24 hours.  There were 8 couplets.  There were 4 short runs of  primary atrial tachycardia; the longest lasted 2.5 seconds at a rate of 160 beats  per minute.  No atrial fibrillation was noted.  Rare single ventricular premature  beats were present, 64 over 24 hours with a single ventricular couplet.  No diary  was submitted.     CONCLUSION:  Essentially normal Holter.  Right bundle-branch block and short runs of  primary atrial tachycardia noted.        OSEI GARCIA MD  pa  D 05/21/2018 13:07:45         Lab Results:    Lab Results   Component Value Date     04/19/2018    K 4.4 04/19/2018     04/19/2018    CO2 27 04/19/2018    BUN 21 04/19/2018    CREATININE 0.80 04/19/2018    CALCIUM 9.4 04/19/2018     Lab Results   Component Value Date    CHOL 145 01/01/2018    TRIG 83 01/01/2018    HDL 51 01/01/2018     BNP (pg/mL)   Date Value   01/26/2018 188 (H)   01/01/2018 1096 (H)   12/31/2017 1031 (H)     Creatinine (mg/dL)   Date Value   04/19/2018 0.80   03/30/2018 0.76   01/26/2018 0.84   01/10/2018 0.86     LDL Calculated (mg/dL)   Date Value   01/01/2018 77     Lab Results   Component Value Date    WBC 16.3 (H) 01/03/2018    HGB 16.3 01/03/2018    HCT 47.4 01/03/2018    MCV 86 01/03/2018     01/03/2018               This note has been dictated using voice recognition software. Any grammatical or context distortions are unintentional and inherent to the software.      Grover Oliveira M.D., F.A.C.C.  [facility], [place]  [phone]

## 2021-06-20 NOTE — LETTER
Letter by Grover Oliveira MD at      Author: Grover Oliveira MD Service: -- Author Type: --    Filed:  Encounter Date: 6/10/2020 Status: (Other)         Antonio W Behr  [address]  [place] [address]      Concha 10, 2020      Dear Antonio,    This letter is to remind you that you will be due for your follow up appointment with Dr. Grover Oliveira in June, 2020 . To help ensure you are in the best health possible, a regular follow-up with your cardiologist is essential.     Please call our Patient Scheduling Line at [phone] to schedule your appointment at your earliest convenience.  If you have recently scheduled an appointment, please disregard this letter.    We look forward to seeing you again. As always, we are available at the number  above for any questions or concerns you may have.      Sincerely,     The Physicians and Staff of [facility]

## 2021-06-21 NOTE — LETTER
Letter by Vane Martin CNP at      Author: Vane Maritn CNP Service: -- Author Type: --    Filed:  Encounter Date: 3/25/2021 Status: (Other)         March 25, 2021     Patient: Matthew W Behr   YOB: 1965   Date of Visit: 3/25/2021       To Whom It May Concern:    It is my medical opinion that Matthew Behr should remain out of work until April 26, 2021.    If you have any questions or concerns, please don't hesitate to call.    Sincerely,        Electronically signed by Vane Martin CNP

## 2021-06-22 ENCOUNTER — OFFICE VISIT - HEALTHEAST (OUTPATIENT)
Dept: CARDIOLOGY | Facility: CLINIC | Age: 56
End: 2021-06-22

## 2021-06-22 DIAGNOSIS — I42.8 NONISCHEMIC CARDIOMYOPATHY (H): ICD-10-CM

## 2021-06-22 DIAGNOSIS — I50.9 ACUTE DECOMPENSATED HEART FAILURE (H): ICD-10-CM

## 2021-06-22 DIAGNOSIS — I50.9 CHF (CONGESTIVE HEART FAILURE) (H): ICD-10-CM

## 2021-06-22 DIAGNOSIS — I50.20 HEART FAILURE WITH REDUCED EJECTION FRACTION (H): ICD-10-CM

## 2021-06-22 DIAGNOSIS — R07.9 CHEST PAIN WITH NORMAL CORONARY ANGIOGRAPHY: ICD-10-CM

## 2021-06-22 DIAGNOSIS — I63.411 CEREBRAL INFARCTION DUE TO EMBOLISM OF RIGHT MIDDLE CEREBRAL ARTERY (H): ICD-10-CM

## 2021-06-22 LAB
ANION GAP SERPL CALCULATED.3IONS-SCNC: 10 MMOL/L (ref 5–18)
BNP SERPL-MCNC: 18 PG/ML (ref 0–45)
BUN SERPL-MCNC: 25 MG/DL (ref 8–22)
CALCIUM SERPL-MCNC: 9 MG/DL (ref 8.5–10.5)
CHLORIDE BLD-SCNC: 105 MMOL/L (ref 98–107)
CO2 SERPL-SCNC: 24 MMOL/L (ref 22–31)
CREAT SERPL-MCNC: 0.9 MG/DL (ref 0.7–1.3)
GFR SERPL CREATININE-BSD FRML MDRD: >60 ML/MIN/1.73M2
GLUCOSE BLD-MCNC: 94 MG/DL (ref 70–125)
MAGNESIUM SERPL-MCNC: 2.1 MG/DL (ref 1.8–2.6)
POTASSIUM BLD-SCNC: 4.6 MMOL/L (ref 3.5–5)
SODIUM SERPL-SCNC: 139 MMOL/L (ref 136–145)

## 2021-06-22 RX ORDER — CARVEDILOL 12.5 MG/1
18.75 TABLET ORAL 2 TIMES DAILY WITH MEALS
Qty: 180 TABLET | Refills: 3 | Status: SHIPPED | OUTPATIENT
Start: 2021-06-22 | End: 2022-01-28

## 2021-06-22 RX ORDER — FUROSEMIDE 20 MG
TABLET ORAL
Qty: 270 TABLET | Refills: 3 | Status: SHIPPED | OUTPATIENT
Start: 2021-06-22 | End: 2021-09-30

## 2021-06-22 RX ORDER — ENALAPRIL MALEATE 10 MG/1
10 TABLET ORAL DAILY
Qty: 90 TABLET | Refills: 4 | Status: SHIPPED | OUTPATIENT
Start: 2021-06-22 | End: 2022-01-28

## 2021-06-22 RX ORDER — SPIRONOLACTONE 25 MG/1
25 TABLET ORAL DAILY
Qty: 90 TABLET | Refills: 5 | Status: SHIPPED | OUTPATIENT
Start: 2021-06-22 | End: 2021-09-30

## 2021-06-22 RX ORDER — ATORVASTATIN CALCIUM 40 MG/1
40 TABLET, FILM COATED ORAL DAILY
Qty: 90 TABLET | Refills: 0 | Status: SHIPPED | OUTPATIENT
Start: 2021-06-22 | End: 2021-07-28

## 2021-06-22 ASSESSMENT — MIFFLIN-ST. JEOR: SCORE: 1696.75

## 2021-06-25 ENCOUNTER — RECORDS - HEALTHEAST (OUTPATIENT)
Dept: ADMINISTRATIVE | Facility: OTHER | Age: 56
End: 2021-06-25

## 2021-06-25 NOTE — TELEPHONE ENCOUNTER
----- Message from Vane Martin CNP sent at 6/4/2021 11:30 AM CDT -----  Please inform patient of lab results.  BNP mildly elevated.  Based on symptoms he is experiencing I recommend increasing his Lasix to 40 mg daily.  Please call him on Monday to see how he is doing with the increase.  Thank you

## 2021-06-25 NOTE — TELEPHONE ENCOUNTER
Dwain was contacted to assess HF symptoms after increased dose of Lasix. He states he has not seen an improvement in his bloating or shortness of breath. He has a productive cough that started yesterday. Dwain is not weighing himself, so importance of daily weights was reviewed again. Low sodium diet was reinforced, questionable compliance with this. He is taking Lasix 20 mg two times a day. Dwain was asked to call for worsening symptoms and will follow-up if any other providers recs. AB/RN

## 2021-06-25 NOTE — TELEPHONE ENCOUNTER
Call placed to Dwain to give recommendation to increase lasix to 40 mg in AM and 20 mg in PM. Reiterated importance of compliance to avoid hospitalization. AB/RN

## 2021-06-25 NOTE — TELEPHONE ENCOUNTER
Dwain returned my call and states he is down to 194# (from 197#) and feels less bloated. His shortness of breath has improved. His cough has improved, but he is still coughing up some phlegm. He believes he has a resolving URI.   Dwain has been mistakenly taking Lasix 40 mg two times a day instead of 40 mg in AM and 2 mg in PM. He was advised to continue 40 mg in AM and 20 mg in PM. He was asked to call if any weight gain or worsening HF symptoms. AB/RN

## 2021-06-25 NOTE — TELEPHONE ENCOUNTER
Please have him increase lasix to 40 mg in the morning and 20 mg in the afternoon. Stress importance of no pickles and weights daily. Thank you

## 2021-06-25 NOTE — TELEPHONE ENCOUNTER
Results reviewed by Vane Martin NP and called to patient via phone. Recommendation given to increase to 40 mg Lasix daily. He may try to split dose 20mg in AM and 20 mg in PM. Will follow-up Monday with symptoms. Priya Tyler RN     Subjective:       Patient ID: Aurora Castle is a 72 y.o. female.    Chief Complaint: Annual Exam    HPI   Couple of days of dizziness and nausea.    Mild hearing loss.    Wonders if wax contributing.    Would like derm check.  Photosensitivity with purple   discoloration of arms, preented by sunscreen.    Takes naprosyn for knees.    Previous injections.  synvisc 3.     Working on thigh strength..    Intentional wt lost through WW/Mediterranean diet.    Review of Systems   Constitutional: Negative for fever and unexpected weight change.   HENT: Negative for congestion and postnasal drip.    Respiratory: Negative for chest tightness, shortness of breath and wheezing.    Cardiovascular: Negative for chest pain and leg swelling.   Gastrointestinal: Negative for abdominal pain, anal bleeding, constipation, diarrhea, nausea and vomiting.   Genitourinary: Negative for dysuria and urgency.   Skin: Negative for rash.   Neurological: Negative for headaches.   Psychiatric/Behavioral: Negative for dysphoric mood and sleep disturbance. The patient is not nervous/anxious.        Objective:      Physical Exam   Constitutional: She is oriented to person, place, and time. She appears well-developed and well-nourished. No distress.   HENT:   Head: Normocephalic and atraumatic.   Right Ear: External ear normal.   Left Ear: External ear normal.   Nose: Nose normal.   Mouth/Throat: Oropharynx is clear and moist.   Eyes: Pupils are equal, round, and reactive to light. Conjunctivae and EOM are normal. Right eye exhibits no discharge. Left eye exhibits no discharge. No scleral icterus.   Neck: Normal range of motion. Neck supple. No JVD present. No thyromegaly present.   Cardiovascular: Normal rate, regular rhythm and normal heart sounds. Exam reveals no gallop.   No murmur heard.  Pulmonary/Chest: Effort normal and breath sounds normal. No respiratory distress. She has no wheezes. She has no rales. No breast tenderness, discharge or  bleeding.   Abdominal: Soft. Bowel sounds are normal. She exhibits no distension and no mass. There is no tenderness. There is no rebound and no guarding.   Genitourinary: No breast tenderness, discharge or bleeding.   Musculoskeletal: Normal range of motion. She exhibits no edema or tenderness.   Lymphadenopathy:     She has no cervical adenopathy.   Neurological: She is alert and oriented to person, place, and time. No cranial nerve deficit. Coordination normal.   Skin: Skin is warm and dry. No rash noted.   Psychiatric: She has a normal mood and affect. Her behavior is normal. Judgment and thought content normal.       Results for orders placed or performed in visit on 06/11/19   CBC auto differential   Result Value Ref Range    WBC 4.48 3.90 - 12.70 K/uL    RBC 3.97 (L) 4.00 - 5.40 M/uL    Hemoglobin 12.9 12.0 - 16.0 g/dL    Hematocrit 40.0 37.0 - 48.5 %    Mean Corpuscular Volume 101 (H) 82 - 98 fL    Mean Corpuscular Hemoglobin 32.5 (H) 27.0 - 31.0 pg    Mean Corpuscular Hemoglobin Conc 32.3 32.0 - 36.0 g/dL    RDW 13.2 11.5 - 14.5 %    Platelets 195 150 - 350 K/uL    MPV 10.8 9.2 - 12.9 fL    Gran # (ANC) 2.4 1.8 - 7.7 K/uL    Lymph # 1.4 1.0 - 4.8 K/uL    Mono # 0.4 0.3 - 1.0 K/uL    Eos # 0.2 0.0 - 0.5 K/uL    Baso # 0.05 0.00 - 0.20 K/uL    Gran% 54.0 38.0 - 73.0 %    Lymph% 31.3 18.0 - 48.0 %    Mono% 9.6 4.0 - 15.0 %    Eosinophil% 4.0 0.0 - 8.0 %    Basophil% 1.1 0.0 - 1.9 %    Differential Method Automated    Comprehensive metabolic panel   Result Value Ref Range    Sodium 141 136 - 145 mmol/L    Potassium 4.0 3.5 - 5.1 mmol/L    Chloride 106 95 - 110 mmol/L    CO2 24 23 - 29 mmol/L    Glucose 93 70 - 110 mg/dL    BUN, Bld 29 (H) 8 - 23 mg/dL    Creatinine 0.7 0.5 - 1.4 mg/dL    Calcium 9.3 8.7 - 10.5 mg/dL    Total Protein 6.6 6.0 - 8.4 g/dL    Albumin 4.1 3.5 - 5.2 g/dL    Total Bilirubin 0.6 0.1 - 1.0 mg/dL    Alkaline Phosphatase 60 55 - 135 U/L    AST 19 10 - 40 U/L    ALT 14 10 - 44 U/L    Anion  Gap 11 8 - 16 mmol/L    eGFR if African American >60 >60 mL/min/1.73 m^2    eGFR if non African American >60 >60 mL/min/1.73 m^2     Assessment:       1. Annual physical exam    2. Primary osteoarthritis of right knee        Plan:       Aurora was seen today for annual exam.    Diagnoses and all orders for this visit:    Annual physical exam    Primary osteoarthritis of right knee       She'd like to defer MG to next year.

## 2021-06-25 NOTE — TELEPHONE ENCOUNTER
Follow-up call made to Dwain to assess if any improvement to shortness of breath and abdominal distention since Lasix was increased last week. He states he has not seen an improvement as of yet. He does not weigh himself currently, but we discussed importance of daily weights to monitor fluid status. He plans on being more careful with his diet to help with his symptoms. AB/RN

## 2021-06-26 NOTE — PATIENT INSTRUCTIONS - HE
Please update me after your primary visit today.We talked about having a heart MRI.Please look into the cost of Entresto and either Farxiga and Jardiance.PLease write to me on my chart or call [facility] at [phone] Continue to take the spironolactone every other day

## 2021-06-26 NOTE — PATIENT INSTRUCTIONS - HE
Matthew W Behr,    It was a pleasure to see you today at [facility].     My recommendations after this visit include:  - Please follow up with Dr Oliveira June 22  - Please follow up with Vane Martin in 6 weeks  - I have checked labs and will contact you with results  - Take an extra 20 mg of lasix today    Vane Martin, CNP

## 2021-06-26 NOTE — PROGRESS NOTES
Progress Notes by Vane Martin CNP at 4/19/2018  7:50 AM     Author: Vane Martin CNP Service: -- Author Type: Nurse Practitioner    Filed: 4/19/2018  8:20 AM Encounter Date: 4/19/2018 Status: Signed    : Vane Martin CNP (Nurse Practitioner)           Click to link to [facility] HEART CARE NOTE      Assessment/Recommendations   Assessment:    1. Nonischemic cardiomyopathy with systolic dysfunction, NYHA class II: He continues to have mild fatigue and mild dyspnea on exertion.  His weights have remained stable.  He continues to try to follow a low-sodium diet.  Increased his carvedilol to 18.75 mg twice a day.  He is scheduled for an echocardiogram this morning to reassess heart function.  Dr. Oliveira recommended he follow-up with EP to discuss possible device depending on echocardiogram results.    Plan:  1.   Heart failure medications:  - Beta blocker therapy with carvedilol 18.75 mg twice a day  - ACEI therapy with enalapril 10 mg twice a day  - Diuretic therapy with furosemide 20 mg twice a day  2.  BMP pending  3.  Echocardiogram scheduled for this morning  4.  Continue daily weights and low-sodium diet    Matthew W Behr will follow up with Dr. Oliveira May 18 and in the heart failure clinic in 8 weeks.     History of Present Illness    Mr. Matthew W Behr is a 52 y.o. male seen at [facility] heart failure clinic today for continued follow-up.  He follows up for nonischemic cardiomyopathy with systolic dysfunction.  He had a cardiac MRI on March 1, 2018 which showed an ejection fraction of 37%.  He has a past medical history significant for Janel Parkinson White syndrome.    During the last clinic visit, increase his enalapril to 10 mg twice a day.  He states he tolerated this well.  He denies any dizziness or lightheadedness.  He continues to have mild fatigue.  He has dyspnea on exertion with physical labor at his job.  He denies any orthopnea or  PND.  He denies any abdominal bloating or lower extremity edema.  He denies lightheadedness, shortness of breath, orthopnea, PND, palpitations, chest pain, abdominal fullness/bloating and lower extremity edema.      He is monitoring home weights which are stable between 180-185 pounds.  He is following a low sodium diet.  He participates in regular physical activity at his work.      Cardiac MRI 3/1/2018:   IMPRESSIONS:   1. Mildly enlarged left ventricular size with normal wall thickness. There is global hypokinesis, no regional wall motion abnormality. The quantified left ventricular ejection fraction is 37%. No myocardial scar is identified.   2. Normal right ventricular size function.   3. No obvious valvular disease     Physical Examination Review of Systems   Vitals:    04/19/18 0753   BP: 116/76   Pulse: 64   Resp: 16     Body mass index is 26.98 kg/(m^2).  Wt Readings from Last 3 Encounters:   04/19/18 188 lb (85.3 kg)   03/30/18 185 lb (83.9 kg)   02/16/18 182 lb 12.8 oz (82.9 kg)       General Appearance:     Alert, cooperative and in no acute distress.   ENT/Mouth: membranes moist, no oral lesions or bleeding gums.      EYES:  no scleral icterus, normal conjunctivae   Neck: no carotid bruits or thyromegaly   Chest/Lungs:   lungs are clear to auscultation, no rales or wheezing, respirations unlabored   Cardiovascular:   Regular. Normal first and second heart sounds with no murmurs, rubs, or gallops; the carotid, radial and posterior tibial pulses are intact, Jugular venous pressure normal, no edema   Abdomen:  Soft, nontender, nondistended, bowel sounds present   Extremities: no cyanosis or clubbing   Skin: warm, dry.    Neurologic: mood and affect are appropriate, alert and oriented x3      General: WNL  Eyes: WNL  Ears/Nose/Throat: WNL  Lungs: WNL  Heart: WNL  Stomach: WNL  Bladder: WNL  Muscle/Joints: WNL  Skin: WNL  Nervous System: WNL  Mental Health: WNL     Blood: WNL     Medical History  Surgical  History Family History Social History   Past Medical History:   Diagnosis Date   ? Acute respiratory failure with hypoxia    ? Amphetamine urine screen positive 12/31/2017   ? Asthma    ? Cellulitis     Left hand   ? Chest pain with normal CT coronary angiography 1/3/2018   ? Nonischemic cardiomyopathy 1/10/2018   ? Janel Parkinson White pattern seen on electrocardiogram     Past Surgical History:   Procedure Laterality Date   ? OTHER SURGICAL HISTORY  About 10 + years ago    Cardiac surgery for Janel Parkinson at [facility]    History reviewed. No pertinent family history. Social History     Social History   ? Marital status:      Spouse name: N/A   ? Number of children: N/A   ? Years of education: N/A     Occupational History   ?  [employer]     Social History Main Topics   ? Smoking status: Never Smoker   ? Smokeless tobacco: Current User     Types: Chew   ? Alcohol use Yes      Comment: 5 beers per week   ? Drug use: No   ? Sexual activity: Not on file     Other Topics Concern   ? Not on file     Social History Narrative          Medications  Allergies   Current Outpatient Prescriptions   Medication Sig Dispense Refill   ? aspirin 81 MG EC tablet Take 1 tablet (81 mg total) by mouth daily.  0   ? carvedilol (COREG) 6.25 MG tablet Take one tablet with one 12.5 mg tablet twice a day for a total of 18.75 mg twice a day 180 tablet 3   ? enalapril (VASOTEC) 10 MG tablet Take 1 tablet (10 mg total) by mouth 2 (two) times a day. 180 tablet 3   ? furosemide (LASIX) 20 MG tablet Take 1 tablet (20 mg total) by mouth 2 (two) times a day at 9am and 6pm. 180 tablet 2   ? VENTOLIN HFA 90 mcg/actuation inhaler Take 2 puffs by mouth every 4 (four) hours. 4-6 hours  0   ? carvedilol (COREG) 12.5 MG tablet Take one tablet with one 6.25 mg tablet twice a day for a total of 18.75 mg twice a day 180 tablet 3     No current facility-administered medications for this visit.       No Known Allergies      Lab Results     Chemistry CBC BNP   Lab Results   Component Value Date    CREATININE 0.76 03/30/2018    BUN 17 03/30/2018     03/30/2018    K 4.3 03/30/2018     03/30/2018    CO2 25 03/30/2018     Creatinine (mg/dL)   Date Value   03/30/2018 0.76   01/26/2018 0.84   01/10/2018 0.86   01/03/2018 0.91    Lab Results   Component Value Date    WBC 16.3 (H) 01/03/2018    HGB 16.3 01/03/2018    HCT 47.4 01/03/2018    MCV 86 01/03/2018     01/03/2018    Lab Results   Component Value Date     (H) 01/26/2018     BNP (pg/mL)   Date Value   01/26/2018 188 (H)   01/01/2018 1096 (H)   12/31/2017 1031 (H)          25 minutes were spent with the patient with greater than 50% spent on education and counseling.      Vane Martin, [facility]   Heart Failure Clinic

## 2021-06-27 NOTE — PROGRESS NOTES
Progress Notes by Vane Martin CNP at 4/22/2019  7:50 AM     Author: Vane Martin CNP Service: -- Author Type: Nurse Practitioner    Filed: 4/22/2019  8:33 AM Encounter Date: 4/22/2019 Status: Signed    : Vane Martin CNP (Nurse Practitioner)           Click to link to [facility] HEART CARE NOTE      Assessment/Recommendations   Assessment:    1. Nonischemic cardiomyopathy with systolic dysfunction, NYHA class III: Decompensated.  He states his symptoms of fatigue, dyspnea on exertion have been present for the last few months.  He has been trying to follow a low-sodium diet but states he has been eating lunch at the gas station.  He has been very busy at work.  He was evaluated for pneumonia at his PMD a few months ago which was negative.  He is also having neck pain that occurs with his shortness of breath.  EKG today shows normal sinus rhythm with right bundle branch block.  He had a CTA in January 2018 which had a calcium score of 0.  He states he started to take his furosemide 20 mg daily about a week ago due to his symptoms.  This has not improved symptoms at all.      Plan:  1.   Heart failure medications:  - Beta blocker therapy with carvedilol 18.75 mg twice a day  - ACEI therapy with enalapril 10 mg twice a day  - Diuretic therapy with furosemide 20 mg daily  2.  BMP and BNP pending, will adjust diuretic based on labs  3.  Echocardiogram to reassess heart function  4.  EKG in clinic  5. Will inform Dr Oliveira of posterior neck pain with shortness of breath with activity    Matthew W Behr will follow up with Dr Oliveira in 6-8 weeks and in the heart failure clinic in 2 weeks.     History of Present Illness    Mr. Matthew W Behr is a 53 y.o. male seen at [facility] heart failure clinic today for continued follow-up.  He follows up for nonischemic cardiomyopathy with systolic dysfunction.  His most recent echocardiogram was done September 2018  which showed an ejection fraction of 40-45% with mild mitral regurgitation.  He had a CTA in January 2018 with a calcium score of 0.    Today he comes in with symptoms of fatigue, dyspnea on exertion, orthopnea, abdominal bloating, and posterior neck pain with his shortness of breath.  He states his shortness of breath has been present for the last few months.  He did see his PMD regarding possible pneumonia which was negative. He is also having a productive cough the last week. He denies lightheadedness, shortness of breath, PND, palpitations and chest pain.      He is monitoring home weights which have been increasing.  He is trying to follow a low sodium diet.     ECHO 9/19/2018:   Summary       When compared to the previous study dated 4/19/2018, no significant change.    Left ventricle ejection fraction is moderately decreased.    Normal right ventricular size and systolic function.    The estimated left ventricular ejection fraction is 40-45%.    Left Atrium: Left atrial volume is mildly increased.    Mild mitral regurgitation.          Physical Examination Review of Systems   Vitals:    04/22/19 0755   BP: 114/82   Pulse: 68   Resp: 16     Body mass index is 27.12 kg/m .  Wt Readings from Last 3 Encounters:   04/22/19 189 lb (85.7 kg)   09/19/18 175 lb (79.4 kg)   09/04/18 175 lb (79.4 kg)       General Appearance:     Alert, cooperative and in no acute distress.   ENT/Mouth: membranes moist, no oral lesions or bleeding gums.      EYES:  no scleral icterus, normal conjunctivae   Neck: no carotid bruits or thyromegaly   Chest/Lungs:   Wheezing right lower lobe, respirations unlabored   Cardiovascular:   Regular. Normal first and second heart sounds with no murmurs, rubs, or gallops; the carotid, radial and posterior tibial pulses are intact, Jugular venous pressure normal, trace edema bilateral lower extremities    Abdomen:  Soft, nontender, nondistended, bowel sounds present   Extremities: no cyanosis or  clubbing   Skin: warm, dry.    Neurologic: mood and affect are appropriate, alert and oriented x3      General: Night Sweats, Weight Gain  Eyes: WNL  Ears/Nose/Throat: WNL  Lungs: Cough, Shortness of Breath, Wheezing  Heart: Shortness of Breath with activity  Stomach: WNL  Bladder: WNL  Muscle/Joints: Joint Pain, Muscle Weakness  Skin: WNL  Nervous System: WNL  Mental Health: Anxiety     Blood: WNL     Medical History  Surgical History Family History Social History   Past Medical History:   Diagnosis Date   ? Acute respiratory failure with hypoxia (H)    ? Amphetamine urine screen positive 12/31/2017   ? Asthma    ? Cellulitis     Left hand   ? Chest pain with normal CT coronary angiography 1/3/2018   ? Nonischemic cardiomyopathy (H) 1/10/2018   ? Janel Parkinson White pattern seen on electrocardiogram     Past Surgical History:   Procedure Laterality Date   ? OTHER SURGICAL HISTORY  About 10 + years ago    Cardiac surgery for Janel Parkinson at [facility]    History reviewed. No pertinent family history. Social History     Socioeconomic History   ? Marital status:      Spouse name: Not on file   ? Number of children: Not on file   ? Years of education: Not on file   ? Highest education level: Not on file   Occupational History     Employer: [employer]   Social Needs   ? Financial resource strain: Not on file   ? Food insecurity:     Worry: Not on file     Inability: Not on file   ? Transportation needs:     Medical: Not on file     Non-medical: Not on file   Tobacco Use   ? Smoking status: Never Smoker   ? Smokeless tobacco: Current User     Types: Chew   Substance and Sexual Activity   ? Alcohol use: Yes     Comment: 5 beers per week   ? Drug use: No   ? Sexual activity: Not on file   Lifestyle   ? Physical activity:     Days per week: Not on file     Minutes per session: Not on file   ? Stress: Not on file   Relationships   ? Social connections:     Talks on phone: Not on file     Gets together: Not on  file     Attends Pentecostal service: Not on file     Active member of club or organization: Not on file     Attends meetings of clubs or organizations: Not on file     Relationship status: Not on file   ? Intimate partner violence:     Fear of current or ex partner: Not on file     Emotionally abused: Not on file     Physically abused: Not on file     Forced sexual activity: Not on file   Other Topics Concern   ? Not on file   Social History Narrative   ? Not on file          Medications  Allergies   Current Outpatient Medications   Medication Sig Dispense Refill   ? carvedilol (COREG) 12.5 MG tablet Take one tablet with one 6.25 mg tablet twice a day for a total of 18.75 mg twice a day 180 tablet 3   ? carvedilol (COREG) 6.25 MG tablet Take one tablet with one 12.5 mg tablet twice a day for a total of 18.75 mg twice a day 180 tablet 3   ? enalapril (VASOTEC) 10 MG tablet Take 1 tablet (10 mg total) by mouth 2 (two) times a day. 180 tablet 3   ? furosemide (LASIX) 20 MG tablet Take 1 tablet (20 mg total) by mouth 2 (two) times a day at 9am and 6pm. (Patient taking differently: Take 20 mg by mouth daily.       ) 180 tablet 2   ? VENTOLIN HFA 90 mcg/actuation inhaler Take 2 puffs by mouth every 4 (four) hours. 4-6 hours  0   ? aspirin 81 MG EC tablet Take 1 tablet (81 mg total) by mouth daily.  0     No current facility-administered medications for this visit.       No Known Allergies      Lab Results    Chemistry CBC BNP   Lab Results   Component Value Date    CREATININE 0.73 09/19/2018    BUN 24 (H) 09/19/2018     09/19/2018    K 4.6 09/19/2018     09/19/2018    CO2 25 09/19/2018     Creatinine (mg/dL)   Date Value   09/19/2018 0.73   04/19/2018 0.80   03/30/2018 0.76   01/26/2018 0.84    Lab Results   Component Value Date    WBC 7.8 09/19/2018    HGB 15.2 09/19/2018    HCT 44.6 09/19/2018    MCV 89 09/19/2018     09/19/2018    Lab Results   Component Value Date    BNP 35 09/19/2018     BNP (pg/mL)    Date Value   09/19/2018 35   01/26/2018 188 (H)   01/01/2018 1,096 (H)          25 minutes were spent with the patient with greater than 50% spent on education and counseling.      Vane Martin, [facility]   Heart Failure Clinic

## 2021-06-27 NOTE — PROGRESS NOTES
Progress Notes by Vane Martin CNP at 5/9/2019  7:50 AM     Author: Vane Martin CNP Service: -- Author Type: Nurse Practitioner    Filed: 5/9/2019  8:19 AM Encounter Date: 5/9/2019 Status: Signed    : Vane Martin CNP (Nurse Practitioner)           Click to link to [facility] Heart [facility] HEART [facility] NOTE      Assessment/Recommendations   Assessment:    1. Nonischemic cardiomyopathy with systolic dysfunction, NYHA class II: Compensated.  His symptoms of dyspnea on exertion has improved.  His orthopnea has resolved.  His weights have remained stable since discharge.  He was started on Spironolactone at discharge but had emesis with this and has stopped which has resolved his emesis.  I encouraged him to monitor for signs or symptoms of fluid retention with stopping this.  Unable to titrate medications due to hypotension.    Plan:  1.   Heart failure medications:  - Beta blocker therapy with carvedilol 12.5 mg twice a day  - ACEI therapy with enalapril 10 mg twice a day  - Diuretic therapy with furosemide 40 mg twice a day  2.  BMP pending  3.  Continue daily weights and low-sodium diet  4.  Encouraged regular exercise    Matthew W Behr will follow up with Dr. Oliveira Concha 3 and in the heart failure clinic in 6 weeks.     History of Present Illness    Mr. Matthew W Behr is a 53 y.o. male seen at [facility] heart failure clinic today for continued follow-up.  He follows up for nonischemic cardiomyopathy with systolic dysfunction.  He had an echocardiogram April 22, 2019 which showed an ejection fraction of 24% along with moderate reduced RV function, severe mitral regurgitation and moderate tricuspid regurgitation.  He had a CTA in January 2018 with a calcium score of 0.    He was hospitalized at [facility] from April 22 to April 24, 2019 for acute on chronic heart failure.  He was diuresed and symptoms improved significantly.  He was started on Spironolactone.   He has been doing well since.    Today, he comes in stating his symptoms have improved significantly since hospitalization.  He continues to have mild fatigue and dyspnea on exertion.  He denies any orthopnea or PND.  He denies chest pain.  He continues to have wheezing but states he needs a refill of his Ventolin.  He denies abdominal bloating or lower extremity edema.  He was having emesis after starting spironolactone which is the only new medication that was started in the hospital.  He states he stopped this a few days ago and his emesis has stopped.  He denies lightheadedness, shortness of breath, orthopnea, PND, palpitations, chest pain, abdominal fullness/bloating and lower extremity edema.      He is monitoring home weights which are stable between 179-180 pounds.  He is trying to follow a low sodium diet.        ECHO 4/22/2019:   Summary       Left ventricle is mild to moderately dilated    Left ventricle ejection fraction is severely globally reduced. The calculated left ventricular ejection fraction is 24%.    E/e' > 15, suggesting elevated LV filling pressures.    Moderate left atrial enlargement with mild right atrial enlargement    Mild right ventricular enlargement with moderate reduction in right ventricular function    Severe eccentric jet of mitral regurgitation.    Moderate tricuspid regurgitation with elevated estimate of RV systolic pressures at 48 mmHg plus right atrial pressure    Dilated IVC consistent with elevated central venous pressure    When compared to the previous study dated 9/19/2018, there has been a significant interval decline in both LV function and RV function with dilatation of both the left ventricle and right ventricle. Significant mitral regurgitation now noted          Physical Examination Review of Systems   Vitals:    05/09/19 0750   BP: 102/70   Pulse: 64   Resp: 16     Body mass index is 26.4 kg/m .  Wt Readings from Last 3 Encounters:   05/09/19 184 lb (83.5 kg)    04/24/19 178 lb 11.2 oz (81.1 kg)   04/22/19 189 lb (85.7 kg)       General Appearance:     Alert, cooperative and in no acute distress.   ENT/Mouth: membranes moist, no oral lesions or bleeding gums.      EYES:  no scleral icterus, normal conjunctivae   Neck: no carotid bruits or thyromegaly   Chest/Lungs:    wheezing left lung, respirations unlabored   Cardiovascular:   Regular. Normal first and second heart sounds with no murmurs, rubs, or gallops; the carotid, radial and posterior tibial pulses are intact, Jugular venous pressure normal, no edema   Abdomen:  Soft, nontender, nondistended, bowel sounds present   Extremities: no cyanosis or clubbing   Skin: warm, dry.    Neurologic: mood and affect are appropriate, alert and oriented x3      General: WNL  Eyes: WNL  Ears/Nose/Throat: WNL  Lungs: Wheezing  Heart: WNL  Stomach: Nausea  Bladder: WNL  Muscle/Joints: WNL  Skin: WNL  Nervous System: WNL  Mental Health: WNL     Blood: WNL     Medical History  Surgical History Family History Social History   Past Medical History:   Diagnosis Date   ? Acute respiratory failure with hypoxia (H)    ? Amphetamine urine screen positive 12/31/2017   ? Asthma    ? Cellulitis     Left hand   ? Chest pain with normal CT coronary angiography 1/3/2018   ? Nonischemic cardiomyopathy (H) 1/10/2018   ? Janel Parkinson White pattern seen on electrocardiogram     Past Surgical History:   Procedure Laterality Date   ? OTHER SURGICAL HISTORY  About 10 + years ago    Cardiac surgery for Janel Parkinson at [facility]    Family History   Problem Relation Age of Onset   ? COPD Mother     Social History     Socioeconomic History   ? Marital status:      Spouse name: Not on file   ? Number of children: Not on file   ? Years of education: Not on file   ? Highest education level: Not on file   Occupational History     Employer: [employer]   Social Needs   ? Financial resource strain: Not on file   ? Food insecurity:     Worry: Not on  file     Inability: Not on file   ? Transportation needs:     Medical: Not on file     Non-medical: Not on file   Tobacco Use   ? Smoking status: Never Smoker   ? Smokeless tobacco: Current User     Types: Chew   Substance and Sexual Activity   ? Alcohol use: Yes     Comment: 5 beers per week   ? Drug use: No   ? Sexual activity: Not on file   Lifestyle   ? Physical activity:     Days per week: Not on file     Minutes per session: Not on file   ? Stress: Not on file   Relationships   ? Social connections:     Talks on phone: Not on file     Gets together: Not on file     Attends Taoist service: Not on file     Active member of club or organization: Not on file     Attends meetings of clubs or organizations: Not on file     Relationship status: Not on file   ? Intimate partner violence:     Fear of current or ex partner: Not on file     Emotionally abused: Not on file     Physically abused: Not on file     Forced sexual activity: Not on file   Other Topics Concern   ? Not on file   Social History Narrative   ? Not on file          Medications  Allergies   Current Outpatient Medications   Medication Sig Dispense Refill   ? aspirin 81 MG EC tablet Take 1 tablet (81 mg total) by mouth daily.  0   ? carvedilol (COREG) 12.5 MG tablet Take 1 tablet (12.5 mg total) by mouth 2 (two) times a day with meals. 180 tablet 3   ? enalapril (VASOTEC) 10 MG tablet Take 1 tablet (10 mg total) by mouth 2 (two) times a day. 180 tablet 3   ? furosemide (LASIX) 40 MG tablet Take 1 tablet (40 mg total) by mouth 2 (two) times a day at 9am and 6pm. 60 tablet 5   ? VENTOLIN HFA 90 mcg/actuation inhaler Take 2 puffs by mouth every 4 (four) hours as needed.         0     No current facility-administered medications for this visit.       No Known Allergies      Lab Results    Chemistry CBC BNP   Lab Results   Component Value Date    CREATININE 0.88 04/24/2019    BUN 19 04/24/2019     04/24/2019    K 4.8 04/24/2019     04/24/2019     CO2 31 04/24/2019     Creatinine (mg/dL)   Date Value   04/24/2019 0.88   04/23/2019 0.88   04/22/2019 0.83   04/22/2019 0.84    Lab Results   Component Value Date    WBC 10.8 04/22/2019    HGB 14.7 04/22/2019    HCT 44.5 04/22/2019    MCV 90 04/22/2019     04/22/2019    Lab Results   Component Value Date    BNP 1,258 (H) 04/22/2019     BNP (pg/mL)   Date Value   04/22/2019 1,258 (H)   09/19/2018 35   01/26/2018 188 (H)          25 minutes were spent with the patient with greater than 50% spent on education and counseling.      Vane Martin, [facility]   Heart Failure Clinic

## 2021-06-28 NOTE — PROGRESS NOTES
Progress Notes by Vane Martin CNP at 3/5/2020  7:50 AM     Author: Vane Martin CNP Service: -- Author Type: Nurse Practitioner    Filed: 3/5/2020  8:21 AM Encounter Date: 3/5/2020 Status: Signed    : Vane Martin CNP (Nurse Practitioner)             Assessment/Recommendations   Assessment:    1. Nonischemic cardiomyopathy with systolic dysfunction, NYHA class II: Compensated.  No signs or symptoms of fluid retention.  He does complain of fatigue but this could be multifactorial.  He states he works 50 to 60 hours a week.  He is not sleeping well due to his hip pain.  He also has gained some weight due to not eating healthy.  We discussed the importance of losing some weight for his hip and possible surgery.  He also states he has not been compliant with taking his medications.  He has restarted them about a week ago.    Plan:  1.   Heart failure medications:  - Beta blocker therapy with carvedilol 12.5 mg twice a day  - ACEI therapy with enalapril 10 mg twice a day  2.  Work on weight loss and follow a low-sodium diet    Matthew W Behr will follow up with Dr. Oliveira in 6 weeks and in the heart failure clinic in 3 to 4 months or sooner if needed.     History of Present Illness/Subjective    Mr. Matthew W Behr is a 54 y.o. male seen at [facility] heart failure clinic today for continued follow-up.  He follows up for nonischemic cardiomyopathy with systolic dysfunction.  He had an echocardiogram June 2019 which showed an ejection fraction of 40% with mild to moderate mitral regurgitation and mild tricuspid regurgitation.  He complains of hip pain today and plans on following up with orthopedics to possibly have a total hip replacement.    Today, he comes in stating he is doing well.  He complains of fatigue.  He denies dyspnea on exertion, orthopnea, PND, or chest pain.  Denies bloating or lower extremity edema.  He denies lightheadedness, shortness of breath, dyspnea on  exertion, orthopnea, PND, palpitations, chest pain, abdominal fullness/bloating and lower extremity edema.      He is monitoring home weights which have increased. He states he has not been eating the healthiest foods and less active due to hip pain.        Physical Examination Review of Systems   Vitals:    03/05/20 0746   BP: 132/80   Pulse: 68   Resp: 16     Body mass index is 28.67 kg/m .  Wt Readings from Last 3 Encounters:   03/05/20 197 lb (89.4 kg)   06/20/19 188 lb (85.3 kg)   06/03/19 188 lb (85.3 kg)       General Appearance:   no acute distress   ENT/Mouth: membranes moist, no oral lesions or bleeding gums.      EYES:  no scleral icterus, normal conjunctivae   Neck: no carotid bruits or thyromegaly   Chest/Lungs:   lungs are clear to auscultation, no rales or wheezing, equal chest wall expansion    Cardiovascular:   Regular. Normal first and second heart sounds with no murmurs, rubs, or gallops; Jugular venous pressure normal, no edema     Abdomen:  no organomegaly, masses, bruits, or tenderness; bowel sounds are present   Extremities: no cyanosis or clubbing   Skin: no xanthelasma, warm.    Neurologic: normal  bilateral, no tremors     Psychiatric: alert and oriented x3    General: WNL  Eyes: WNL  Ears/Nose/Throat: WNL  Lungs: WNL  Heart: WNL  Stomach: WNL  Bladder: WNL  Muscle/Joints: WNL  Skin: WNL  Nervous System: WNL  Mental Health: WNL     Blood: WNL     Medical History  Surgical History Family History Social History   Past Medical History:   Diagnosis Date   ? Acute respiratory failure with hypoxia (H)    ? Amphetamine urine screen positive 12/31/2017   ? Asthma    ? Cellulitis     Left hand   ? Chest pain with normal CT coronary angiography 1/3/2018   ? Nonischemic cardiomyopathy (H) 1/10/2018   ? Janel Parkinson White pattern seen on electrocardiogram     Past Surgical History:   Procedure Laterality Date   ? OTHER SURGICAL HISTORY  About 10 + years ago    Cardiac surgery for Janel Parkinson  at [facility]    Family History   Problem Relation Age of Onset   ? COPD Mother     Social History     Socioeconomic History   ? Marital status:      Spouse name: Not on file   ? Number of children: Not on file   ? Years of education: Not on file   ? Highest education level: Not on file   Occupational History     Employer: [employer]   Social Needs   ? Financial resource strain: Not on file   ? Food insecurity:     Worry: Not on file     Inability: Not on file   ? Transportation needs:     Medical: Not on file     Non-medical: Not on file   Tobacco Use   ? Smoking status: Never Smoker   ? Smokeless tobacco: Current User     Types: Chew   Substance and Sexual Activity   ? Alcohol use: Yes     Comment: 5 beers per week   ? Drug use: No   ? Sexual activity: Not on file   Lifestyle   ? Physical activity:     Days per week: Not on file     Minutes per session: Not on file   ? Stress: Not on file   Relationships   ? Social connections:     Talks on phone: Not on file     Gets together: Not on file     Attends Shinto service: Not on file     Active member of club or organization: Not on file     Attends meetings of clubs or organizations: Not on file     Relationship status: Not on file   ? Intimate partner violence:     Fear of current or ex partner: Not on file     Emotionally abused: Not on file     Physically abused: Not on file     Forced sexual activity: Not on file   Other Topics Concern   ? Not on file   Social History Narrative   ? Not on file          Medications  Allergies   Current Outpatient Medications   Medication Sig Dispense Refill   ? aspirin 81 MG EC tablet Take 1 tablet (81 mg total) by mouth daily.  0   ? carvedilol (COREG) 12.5 MG tablet Take 1 tablet (12.5 mg total) by mouth 2 (two) times a day with meals. 180 tablet 3   ? enalapril (VASOTEC) 10 MG tablet Take 1 tablet (10 mg total) by mouth 2 (two) times a day. 180 tablet 3   ? VENTOLIN HFA 90 mcg/actuation inhaler Take 2 puffs by mouth  every 4 (four) hours as needed.         0     No current facility-administered medications for this visit.     No Known Allergies      Lab Results    Chemistry/lipid CBC Cardiac Enzymes/BNP/TSH/INR   Lab Results   Component Value Date    CHOL 145 01/01/2018    HDL 51 01/01/2018    LDLCALC 77 01/01/2018    TRIG 83 01/01/2018    CREATININE 0.81 06/03/2019    BUN 25 (H) 06/03/2019    K 4.6 06/03/2019     06/03/2019     06/03/2019    CO2 31 06/03/2019    Lab Results   Component Value Date    WBC 10.8 04/22/2019    HGB 14.7 04/22/2019    HCT 44.5 04/22/2019    MCV 90 04/22/2019     04/22/2019    Lab Results   Component Value Date    TROPONINI 0.01 04/22/2019    BNP 84 (H) 06/03/2019    TSH 1.96 04/22/2019             This note has been dictated using voice recognition software. Any grammatical, typographical, or context distortions are unintentional and inherent to the software

## 2021-06-30 NOTE — PROGRESS NOTES
Progress Notes by Vane Martin CNP at 5/14/2021  8:30 AM     Author: Vane Martin CNP Service: -- Author Type: Nurse Practitioner    Filed: 5/14/2021  9:05 AM Encounter Date: 5/14/2021 Status: Signed    : Vane Martin CNP (Nurse Practitioner)             Assessment/Recommendations   Assessment:    1. Nonischemic cardiomyopathy with systolic dysfunction, LVEF 17%, NYHA class II: Compensated.  He has mild fatigue and dyspnea on exertion.  He denies orthopnea, PND, chest pain or edema.  His weight is stable.  He continues to try to follow a low-sodium diet.  He states his lightheadedness has improved.  He is scheduled for an echocardiogram later today.    2.  Hypertension: Controlled.  Blood pressure 122/78    Plan:  1.   Heart failure medications:  - Beta blocker therapy with carvedilol increased to 18.75 mg twice a day  - ACEI therapy with enalapril 10 mg daily  - Aldosterone blocker therapy with spironolactone 12.5 mg daily.    - Diuretic therapy with furosemide 20 mg daily  2.  Continue monitoring weights and low-sodium diet  3.  Echocardiogram scheduled for today    Matthew W Behr will follow up with Dr. Oliveira in 6 weeks and in the heart failure clinic in 3 weeks.     History of Present Illness/Subjective    Mr. Matthew W Behr is a 55 y.o. male seen at [facility] heart failure clinic today for continued follow-up.  He follows up for heart failure with reduced ejection fraction, nonischemic cardiomyopathy.  His most recent echocardiogram showed an ejection fraction of 17% with mild to moderate mitral regurgitation.  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  He has mild numbness in his left hand and fingers.  He has a past medical history significant for hypertension and anxiety.    Today, he states he continues to feel well.  He has mild fatigue and dyspnea on exertion.  He denies orthopnea, PND, chest pain or edema.  He states  his lightheadedness has improved.  He denies lightheadedness, shortness of breath, orthopnea, PND, palpitations, chest pain, abdominal fullness/bloating and lower extremity edema.      He is monitoring home weights which are stable.  He is following a low sodium diet.          Physical Examination Review of Systems   Vitals:    05/14/21 0834   BP: 122/78   Pulse: 64   Resp: 16     Body mass index is 28.8 kg/m .  Wt Readings from Last 3 Encounters:   05/14/21 195 lb (88.5 kg)   04/26/21 194 lb (88 kg)   04/08/21 191 lb (86.6 kg)       General Appearance:   no acute distress   ENT/Mouth: membranes moist, no oral lesions or bleeding gums.      EYES:  no scleral icterus, normal conjunctivae   Neck: no carotid bruits or thyromegaly   Chest/Lungs:   lungs are clear to auscultation, no rales or wheezing, equal chest wall expansion    Cardiovascular:   Regular. Normal first and second heart sounds with no murmurs, rubs, or gallops; Jugular venous pressure normal, no edema bilaterally    Abdomen:  no organomegaly, masses, bruits, or tenderness; bowel sounds are present   Extremities: no cyanosis or clubbing   Skin: no xanthelasma, warm.    Neurologic: normal  bilateral, no tremors     Psychiatric: alert and oriented x3    General: WNL  Eyes: WNL  Ears/Nose/Throat: WNL  Lungs: WNL  Heart: WNL  Stomach: WNL  Bladder: WNL  Muscle/Joints: WNL  Skin: WNL  Nervous System: WNL  Mental Health: WNL     Blood: WNL     Medical History  Surgical History Family History Social History   Past Medical History:   Diagnosis Date   ? Acute respiratory failure with hypoxia (H)    ? Amphetamine urine screen positive 12/31/2017   ? Asthma    ? Cellulitis     Left hand   ? Chest pain with normal CT coronary angiography 1/3/2018   ? Nonischemic cardiomyopathy (H) 1/10/2018   ? Janel Parkinson White pattern seen on electrocardiogram     Past Surgical History:   Procedure Laterality Date   ? OTHER SURGICAL HISTORY  About 10 + years ago    Cardiac  surgery for Janel Parkinson at [facility]    Family History   Problem Relation Age of Onset   ? COPD Mother     Social History     Socioeconomic History   ? Marital status:      Spouse name: Not on file   ? Number of children: Not on file   ? Years of education: Not on file   ? Highest education level: Not on file   Occupational History     Employer: [employer]   Social Needs   ? Financial resource strain: Not on file   ? Food insecurity     Worry: Not on file     Inability: Not on file   ? Transportation needs     Medical: Not on file     Non-medical: Not on file   Tobacco Use   ? Smoking status: Never Smoker   ? Smokeless tobacco: Current User     Types: Chew   Substance and Sexual Activity   ? Alcohol use: Yes     Comment: 5 beers per week   ? Drug use: No   ? Sexual activity: Not on file   Lifestyle   ? Physical activity     Days per week: Not on file     Minutes per session: Not on file   ? Stress: Not on file   Relationships   ? Social connections     Talks on phone: Not on file     Gets together: Not on file     Attends [employer] service: Not on file     Active member of club or organization: Not on file     Attends meetings of clubs or organizations: Not on file     Relationship status: Not on file   ? Intimate partner violence     Fear of current or ex partner: Not on file     Emotionally abused: Not on file     Physically abused: Not on file     Forced sexual activity: Not on file   Other Topics Concern   ? Not on file   Social History Narrative   ? Not on file          Medications  Allergies   Current Outpatient Medications   Medication Sig Dispense Refill   ? atorvastatin (LIPITOR) 40 MG tablet Take 1 tablet (40 mg total) by mouth daily. 90 tablet 0   ? carvediloL (COREG) 12.5 MG tablet Take 1.5 tablets (18.75 mg total) by mouth 2 (two) times a day with meals. 180 tablet 3   ? enalapril (VASOTEC) 10 MG tablet Take 1 tablet (10 mg total) by mouth daily. 90 tablet 0   ? furosemide (LASIX) 20 MG  tablet Take 1 tablet (20 mg total) by mouth daily. 90 tablet 3   ? hydrOXYzine HCL (ATARAX) 25 MG tablet Take 1 tablet (25 mg total) by mouth every 6 (six) hours as needed for anxiety. 12 tablet 0   ? sertraline (ZOLOFT) 50 MG tablet Take 50 mg by mouth daily.     ? spironolactone (ALDACTONE) 25 MG tablet Take 0.5 tablets (12.5 mg total) by mouth daily. 45 tablet 0   ? VENTOLIN HFA 90 mcg/actuation inhaler Take 2 puffs by mouth every 4 (four) hours as needed.         0   ? warfarin ANTICOAGULANT (COUMADIN/JANTOVEN) 3 MG tablet Take 1 tablets (3 mg) by mouth daily. Adjust dose based on INR results as directed. 30 tablet 0     No current facility-administered medications for this visit.     No Known Allergies      Lab Results    Chemistry/lipid CBC Cardiac Enzymes/BNP/TSH/INR   Lab Results   Component Value Date    CHOL 245 (H) 03/18/2021    HDL 49 03/18/2021    LDLCALC 147 (H) 03/18/2021    TRIG 243 (H) 03/18/2021    CREATININE 0.82 04/26/2021    BUN 19 04/26/2021    K 4.3 04/26/2021     04/26/2021     04/26/2021    CO2 27 04/26/2021    Lab Results   Component Value Date    WBC 10.1 03/20/2021    HGB 16.4 03/20/2021    HCT 47.8 03/20/2021    MCV 88 03/20/2021     03/20/2021    Lab Results   Component Value Date    TROPONINI 0.03 03/18/2021    BNP 99 (H) 04/26/2021    TSH 1.96 04/22/2019    INR 1.13 (H) 03/22/2021             This note has been dictated using voice recognition software. Any grammatical, typographical, or context distortions are unintentional and inherent to the software    30 minutes spent on the date of encounter doing chart review, review of test results, patient visit and documentation.

## 2021-06-30 NOTE — PROGRESS NOTES
Progress Notes by Vane Martin CNP at 3/17/2021  8:30 AM     Author: Vane Martin CNP Service: -- Author Type: Nurse Practitioner    Filed: 3/17/2021 10:24 AM Encounter Date: 3/17/2021 Status: Signed    : Vane Martin CNP (Nurse Practitioner)             Assessment/Recommendations   Assessment:    1. Nonischemic cardiomyopathy with systolic dysfunction, NYHA class IV: Decompensated.  Patient was recently seen in the ED with same symptoms including dyspnea on exertion, orthopnea, abdominal distention.  He states this has been going on for the last 3 days.  He is unable to sleep.  He did not receive any IV diuretics in the ER and was told to follow-up in the heart clinic.  He comes in today barely been able to walk from the parking lot to the front of the building.  He is having shortness of breath at rest and is diaphoretic.  His oxygen level is 98% on room air.  I recommended direct admission for acute heart failure for IV diuresis.  He agrees with this plan.    Plan:  1.  Direct admission for IV diuresis for acute heart failure  2.  Recommend echocardiogram and referral for cardiology during hospitalization    Matthew W Behr will follow up with Dr. Oliveira in 4 to 6 weeks and in the heart failure clinic in 2 weeks.     History of Present Illness/Subjective    Mr. Matthew W Behr is a 55 y.o. male seen at [facility] heart failure clinic today for continued follow-up.  He follows up for nonischemic cardiomyopathy with systolic dysfunction.  He has not followed up for the last year.  He had stopped taking his cardiac medications due to chronic fatigue.  His last echocardiogram was done June 2019 which showed ejection fraction of 40% with mild to moderate mitral regurgitation and mild tricuspid regurgitation.  He was seen in the ED a few days ago for his same symptoms.  He was sent home and told to follow-up in the heart clinic.    Today, he has significant dyspnea on exertion and  shortness of breath at rest.  He complains of abdominal distention and orthopnea and PND.  He has not been able to sleep the last few days.  Denies chest pain or pressure.  He denies palpitations, chest pain and lower extremity edema.             Physical Examination Review of Systems   Vitals:    03/17/21 0839   BP: 110/80   Pulse: 80   Resp: 16   SpO2: 98%     Body mass index is 27.62 kg/m .  Wt Readings from Last 3 Encounters:   03/17/21 187 lb (84.8 kg)   03/15/21 180 lb (81.6 kg)   03/05/20 197 lb (89.4 kg)       General Appearance:    Acute distress, shortness of breath at rest, diaphoretic   ENT/Mouth: membranes moist, no oral lesions or bleeding gums.      EYES:  no scleral icterus, normal conjunctivae   Neck: no carotid bruits or thyromegaly   Chest/Lungs:   lungs are decreased to auscultation, no rales or wheezing, equal chest wall expansion    Cardiovascular:   Regular. Normal first and second heart sounds with no murmurs, rubs, or gallops; Jugular venous pressure normal, no edema bilaterally    Abdomen:   Distended, no organomegaly, masses, bruits, or tenderness; bowel sounds are present   Extremities: no cyanosis or clubbing   Skin: no xanthelasma, warm.    Neurologic: normal  bilateral, no tremors     Psychiatric: alert and oriented x3    General: Night Sweats  Eyes: WNL  Ears/Nose/Throat: WNL  Lungs: Shortness of Breath  Heart: Shortness of Breath with activity  Stomach: WNL  Bladder: WNL  Muscle/Joints: WNL  Skin: WNL  Nervous System: WNL  Mental Health: Anxiety     Blood: WNL     Medical History  Surgical History Family History Social History   Past Medical History:   Diagnosis Date   ? Acute respiratory failure with hypoxia (H)    ? Amphetamine urine screen positive 12/31/2017   ? Asthma    ? Cellulitis     Left hand   ? Chest pain with normal CT coronary angiography 1/3/2018   ? Nonischemic cardiomyopathy (H) 1/10/2018   ? Janel Parkinson White pattern seen on electrocardiogram     Past  Surgical History:   Procedure Laterality Date   ? OTHER SURGICAL HISTORY  About 10 + years ago    Cardiac surgery for Janel Parkinson at [facility]    Family History   Problem Relation Age of Onset   ? COPD Mother     Social History     Socioeconomic History   ? Marital status:      Spouse name: Not on file   ? Number of children: Not on file   ? Years of education: Not on file   ? Highest education level: Not on file   Occupational History     Employer: [employer]   Social Needs   ? Financial resource strain: Not on file   ? Food insecurity     Worry: Not on file     Inability: Not on file   ? Transportation needs     Medical: Not on file     Non-medical: Not on file   Tobacco Use   ? Smoking status: Never Smoker   ? Smokeless tobacco: Current User     Types: Chew   Substance and Sexual Activity   ? Alcohol use: Yes     Comment: 5 beers per week   ? Drug use: No   ? Sexual activity: Not on file   Lifestyle   ? Physical activity     Days per week: Not on file     Minutes per session: Not on file   ? Stress: Not on file   Relationships   ? Social connections     Talks on phone: Not on file     Gets together: Not on file     Attends Holiness service: Not on file     Active member of club or organization: Not on file     Attends meetings of clubs or organizations: Not on file     Relationship status: Not on file   ? Intimate partner violence     Fear of current or ex partner: Not on file     Emotionally abused: Not on file     Physically abused: Not on file     Forced sexual activity: Not on file   Other Topics Concern   ? Not on file   Social History Narrative   ? Not on file          Medications  Allergies   Current Outpatient Medications   Medication Sig Dispense Refill   ? aspirin 81 MG EC tablet Take 1 tablet (81 mg total) by mouth daily.  0   ? carvedilol (COREG) 12.5 MG tablet Take 1 tablet (12.5 mg total) by mouth 2 (two) times a day with meals. 180 tablet 3   ? enalapril (VASOTEC) 10 MG tablet Take 1  tablet (10 mg total) by mouth 2 (two) times a day. 180 tablet 3   ? hydrOXYzine HCL (ATARAX) 25 MG tablet Take 1 tablet (25 mg total) by mouth every 6 (six) hours as needed for anxiety. 12 tablet 0   ? VENTOLIN HFA 90 mcg/actuation inhaler Take 2 puffs by mouth every 4 (four) hours as needed.         0     No current facility-administered medications for this visit.     No Known Allergies      Lab Results    Chemistry/lipid CBC Cardiac Enzymes/BNP/TSH/INR   Lab Results   Component Value Date    CHOL 145 01/01/2018    HDL 51 01/01/2018    LDLCALC 77 01/01/2018    TRIG 83 01/01/2018    CREATININE 0.82 03/15/2021    BUN 18 03/15/2021    K 4.5 03/15/2021     03/15/2021     03/15/2021    CO2 25 03/15/2021    Lab Results   Component Value Date    WBC 8.0 03/15/2021    HGB 13.8 (L) 03/15/2021    HCT 41.3 03/15/2021    MCV 89 03/15/2021     03/15/2021    Lab Results   Component Value Date    TROPONINI 0.04 03/15/2021     (H) 03/15/2021    TSH 1.96 04/22/2019             This note has been dictated using voice recognition software. Any grammatical, typographical, or context distortions are unintentional and inherent to the software    40 minutes spent on the date of encounter doing chart review, review of test results, patient visit, documentation and discussion with other provider.

## 2021-06-30 NOTE — PROGRESS NOTES
Progress Notes by Grover Oliveira MD at 4/26/2021 11:30 AM     Author: Grover Oliveira MD Service: -- Author Type: Physician    Filed: 4/26/2021 12:25 PM Encounter Date: 4/26/2021 Status: Signed    : Grover Oliveira MD (Physician)             [facility] Progress Note    Assessment:  1.  Systolic heart failure.  Patient does not appear to be in overt heart failure on today's examination.  We spent some time again reviewing the recent hospitalization and current combination of medications.  I did not uptitrate the carvedilol today secondary to blood pressure in the 90 systolic some lightheadedness upon standing.  We will change the lisinopril to bedtime dosing and then update me with how he is done with the symptom.  We are going to plan a follow-up echocardiogram to reevaluate left ventricular function.  Ejection fraction had declined to 17% during the most recent echocardiogram with previous echocardiogram June 2019 of 40%.  He tells me he is committed to taking the medications and following prudent diet and salt striction.  CTA from 2018 revealed no obstructive coronary artery disease.  Mild dilatation of the proximal aorta at that time.    2.  Nonischemic cardiomyopathy with nonsustained ventricular tachycardia during the hospitalization.  He is chosen not to wear the LifeVest and is aware of the potential risk of sudden death.  Pending laboratory studies today we will plan to discuss the possibility of repeating a Holter monitor in addition to the echocardiogram.  We will also make determinations about whether we can cut back on the furosemide as he does not appear to be volume overloaded on the current examination.    3.  CVA.  Probably embolic.  Minimal residual of numbness of his left hand.      Plan: Lab studies today.           Limited echocardiogram.           We will plan to discuss Holter monitor pending lab results from today.    1. CHF (congestive heart failure) (H)   Basic metabolic panel    Magnesium    BNP(B-type Natriuretic Peptide)    Echo Limited   2. Nonischemic cardiomyopathy (H)     3. Heart failure with reduced ejection fraction (H)           An After Visit Summary was printed and given to the patient.    Subjective:    Matthew W Behr is a 55 y.o. male who returned for a planned  follow up visit.  I have reviewed the notes from the hospitalization 1 month ago.  Unfortunately he has not followed up with us despite making contact with him on the few occasions since June 2019.  Similar presentation in April 2019 when he taking his medications.  He reports feeling well today.  He is not describing chest pain, orthopnea, PND, dizziness.    He does endorse mild lightheadedness upon standing which has been a little bit more pronounced as the recent increase in carvedilol.  He has not been wearing a LifeVest and in fact sent the LifeVest back.  We reviewed again today the reasons for the recommendations for the LifeVest as it relates to increased risk of serious arrhythmia and sudden death.  He did have nonsustained ventricular tachycardia during the hospitalization.  He did suffer an acute right middle cerebral artery stroke likely embolic despite no obvious thrombus on the echocardiographic imaging by report.  Review of Systems:   General: WNL  Eyes: WNL  Ears/Nose/Throat: WNL  Lungs: WNL  Heart: WNL  Stomach: WNL  Bladder: WNL  Muscle/Joints: WNL  Skin: WNL  Nervous System: WNL  Mental Health: WNL     Blood: WNL      Problem List:    Patient Active Problem List   Diagnosis   ? Mild intermittent asthma without complication   ? Wide-complex tachycardia (H)   ? Heart failure with reduced ejection fraction (H)   ? Nonischemic cardiomyopathy (H)   ? Amphetamine urine screen positive   ? Acute CHF (H)   ? Chest pain with normal coronary angiography   ? Acute on chronic systolic congestive heart failure (H)   ? Acute decompensated heart failure (H)   ? Cerebral infarction due to  embolism of right middle cerebral artery (H)       Social History     Socioeconomic History   ? Marital status:      Spouse name: Not on file   ? Number of children: Not on file   ? Years of education: Not on file   ? Highest education level: Not on file   Occupational History     Employer: [employer]   Social Needs   ? Financial resource strain: Not on file   ? Food insecurity     Worry: Not on file     Inability: Not on file   ? Transportation needs     Medical: Not on file     Non-medical: Not on file   Tobacco Use   ? Smoking status: Never Smoker   ? Smokeless tobacco: Current User     Types: Chew   Substance and Sexual Activity   ? Alcohol use: Yes     Comment: 5 beers per week   ? Drug use: No   ? Sexual activity: Not on file   Lifestyle   ? Physical activity     Days per week: Not on file     Minutes per session: Not on file   ? Stress: Not on file   Relationships   ? Social connections     Talks on phone: Not on file     Gets together: Not on file     Attends Rastafarian service: Not on file     Active member of club or organization: Not on file     Attends meetings of clubs or organizations: Not on file     Relationship status: Not on file   ? Intimate partner violence     Fear of current or ex partner: Not on file     Emotionally abused: Not on file     Physically abused: Not on file     Forced sexual activity: Not on file   Other Topics Concern   ? Not on file   Social History Narrative   ? Not on file       Family History   Problem Relation Age of Onset   ? COPD Mother        Current Outpatient Medications   Medication Sig Dispense Refill   ? carvediloL (COREG) 12.5 MG tablet Take 18.75 mg (1.5 tablets) in the morning and 12.5 mg in the afternoon 180 tablet 3   ? hydrOXYzine HCL (ATARAX) 25 MG tablet Take 1 tablet (25 mg total) by mouth every 6 (six) hours as needed for anxiety. 12 tablet 0   ? sertraline (ZOLOFT) 50 MG tablet Take 50 mg by mouth daily.     ? VENTOLIN HFA 90 mcg/actuation inhaler  Take 2 puffs by mouth every 4 (four) hours as needed.         0   ? warfarin ANTICOAGULANT (COUMADIN/JANTOVEN) 3 MG tablet Take 1 tablets (3 mg) by mouth daily. Adjust dose based on INR results as directed. 30 tablet 0   ? aspirin 81 MG EC tablet Take 1 tablet (81 mg total) by mouth daily.  0   ? atorvastatin (LIPITOR) 40 MG tablet Take 1 tablet (40 mg total) by mouth daily. 90 tablet 0   ? enalapril (VASOTEC) 10 MG tablet Take 1 tablet (10 mg total) by mouth daily. 90 tablet 0   ? furosemide (LASIX) 40 MG tablet Take 1 tablet (40 mg total) by mouth daily. 90 tablet 0   ? spironolactone (ALDACTONE) 25 MG tablet Take 0.5 tablets (12.5 mg total) by mouth daily. 45 tablet 0     No current facility-administered medications for this visit.        Objective:     BP 94/56 (Patient Site: Right Arm, Patient Position: Sitting, Cuff Size: Adult Regular)   Pulse (!) 52   Resp 18   Wt 194 lb (88 kg)   SpO2 97%   BMI 28.65 kg/m    194 lb (88 kg)   [unfilled]  Wt Readings from Last 3 Encounters:   21 194 lb (88 kg)   21 191 lb (86.6 kg)   21 181 lb (82.1 kg)       Physical Exam:    GENERAL APPEARANCE: alert, no apparent distress  HEENT: no scleral icterus or xanthelasma  NECK: jugular venous pressure within normal limits  CHEST: symmetric, the lungs are clear to auscultation  CARDIOVASCULAR: regular rhythm without murmur, s4; no carotid bruits  Abdomen: No Organomegaly, masses, bruits, or tenderness. Bowels sounds are present      EXTREMITIES: no cyanosis, clubbing or edema    Cardiac Testing:  Echo Complete  Order# [id number]  Reading physician: Seamus Gaston MD Ordering physician: Grover Sanchez MD Study date: 3/18/21   Performing Date Performing Department   Mar 18, 2021  CARDIAC TESTING [[id number]]   Patient Information    Patient Name   Behr, Matthew W MRN   [id number] Sex   Male  1   1965 (55 y.o.)   Indications    Congestive heart failure; systolic dysfunction    Summary         Left ventricle is mildly dilated with mild hypertrophy.    Left ventricle ejection fraction is severely decreased. The calculated left ventricular ejection fraction is 17%.    Normal right ventricular size. The systolic function is mildly reduced. TAPSE is abnormal, which is consistent with abnormal right ventricular systolic function.    Mild to moderate mitral regurgitation is present.    The aortic root is mildly dilated.    When compared to the previous study dated 6/20/2019, there has been a significant decline in left ventricular systolic function. Also, the right ventricle now appears to have mild dysfunction.     No thrombus visualized in the left ventricular apex on contrast images       Conclusion       HOLTER MONITOR     INTERPRETATION:  Predominant rhythm is sinus rhythm with rates ranging from 38 beats  per minute to 124 beats per minute.  An IVCD, probably right bundle-branch block is  present throughout the monitoring period.  Sinus arrhythmia was noted at night.   There were no clinically significant pauses.  Occasional atrial premature beats were  noted, 352 over 24 hours.  There were 8 couplets.  There were 4 short runs of  primary atrial tachycardia; the longest lasted 2.5 seconds at a rate of 160 beats  per minute.  No atrial fibrillation was noted.  Rare single ventricular premature  beats were present, 64 over 24 hours with a single ventricular couplet.  No diary  was submitted.     CONCLUSION:  Essentially normal Holter.  Right bundle-branch block and short runs of  primary atrial tachycardia noted.        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 The estimated left ventricular ejection fraction is 40%.    Normal right ventricular systolic function.    Mild to moderate mitral regurgitation.    Mild tricuspid valve regurgitation. No pulmonary hypertension present.    When compared to the previous study dated 2019, there is significant improvement in left ventricular systolic function. Right ventricular systolic pressures are also improved.        CTA Coronary W Calcium Score  Order# [id number]  Reading physician: Grover Oliveira MD Ordering physician: Nelly Petit MD Study date: 1/3/18   Patient Information    Patient Name   Behr, Matthew W MRN   [id number] Sex   Male  1   1965 (52 y.o.)   Indications    cardiomyopathy; Heart failure; Newly diagnosed cardiomyopathy   Interpretation Summary      The total Agatston calcium score is 0. A calcium score of zero places the individual in the lowest quartile when compared to an age and gender matched control group and implies a low risk of cardiac events in the next 10 years. (less than 1% per year).    No detectable atherosclerotic plaque or significant stenosis observed.    Significant misregistration artifact decreases ability to interpret the examination the findings are most consistent with artifact rather than obstructive artery disease.    Mild dilatation of the proximal aorta at the level of the sinuses of Valsalva    Please see radiology interpretation for additional findings         Lab Results:    Lab Results   Component Value Date     2021    K 4.7 2021     2021    CO2 25 2021    BUN 27 (H) 2021    CREATININE 0.90 2021    CALCIUM 8.8 2021     Lab Results   Component Value Date    CHOL 245 (H) 2021    TRIG 243 (H) 2021    HDL  49 03/18/2021     BNP (pg/mL)   Date Value   03/17/2021 553 (H)   03/15/2021 557 (H)   06/03/2019 84 (H)     Creatinine (mg/dL)   Date Value   04/08/2021 0.90   03/25/2021 0.90   03/20/2021 1.13   03/19/2021 0.99     LDL Calculated (mg/dL)   Date Value   03/18/2021 147 (H)   01/01/2018 77     Lab Results   Component Value Date    WBC 10.1 03/20/2021    HGB 16.4 03/20/2021    HCT 47.8 03/20/2021    MCV 88 03/20/2021     03/20/2021         This note has been dictated using voice recognition software. Any grammatical or context distortions are unintentional and inherent to the software.

## 2021-06-30 NOTE — PROGRESS NOTES
Progress Notes by Vane Martin CNP at 4/8/2021  9:50 AM     Author: Vane Martin CNP Service: -- Author Type: Nurse Practitioner    Filed: 4/8/2021 10:45 AM Encounter Date: 4/8/2021 Status: Signed    : Vane Martin CNP (Nurse Practitioner)             Assessment/Recommendations   Assessment:    1. Nonischemic cardiomyopathy with systolic dysfunction, LVEF 17%, NYHA class II: Compensated.  He states his dyspnea on exertion has improved.  He denies orthopnea, PND or edema.  He has gained some weight due to not being at work and being as active.  He continues not to wear his LifeVest due to anxiety.    2.  Hypertension: Controlled.  Blood pressure 120/58.    3.  Anxiety: His PMD started him on sertraline 50 mg daily    Plan:  1.   Heart failure medications:  - Beta blocker therapy with carvedilol increased to 18.75 mg in the morning and 12.5 mg in the afternoon  - ACEI therapy with enalapril 10 mg daily  - Aldosterone blocker therapy with spironolactone 12.5 mg daily.   - Diuretic therapy with furosemide 40 mg daily  2.  BMP pending  3.  Continue daily weights and low-sodium diet    Matthew W Behr will follow up with Dr. Oliveira April 26 and in the heart failure clinic in 5 weeks.     History of Present Illness/Subjective    Mr. Matthew W Behr is a 55 y.o. male seen at [facility] heart failure clinic today for continued follow-up.   He follows up for heart failure with reduced ejection fraction.  His ex-wife accompanies him today. He was recently hospitalized due to acute heart failure.  During hospitalization he had an echocardiogram which showed ejection fraction of 17% with mild to moderate mitral regurgitation.  He was diuresed and symptoms were improved.  He had stopped taking his medications for a year.  During hospitalization he had a CVA to the right MCA.  He has mild deficit in his left hand.  He follows up with neurology in 1 month.  He was discharged on a LifeVest and  restarted his medications.  He has a past medical history significant for hypertension, anxiety.    Today, he states his dyspnea on exertion has improved.  He denies orthopnea, PND, chest pain, edema.  He denies fatigue, lightheadedness, shortness of breath, orthopnea, PND, palpitations, chest pain, abdominal fullness/bloating and lower extremity edema.      He is monitoring home weights which have increased due to him not being active.  He is following a low sodium diet.            Physical Examination Review of Systems   Vitals:    04/08/21 1011   BP: 120/58   Pulse: 80   Resp: 16     Body mass index is 28.21 kg/m .  Wt Readings from Last 3 Encounters:   04/08/21 191 lb (86.6 kg)   03/25/21 181 lb (82.1 kg)   03/21/21 179 lb 3.2 oz (81.3 kg)       General Appearance:   no acute distress   ENT/Mouth: membranes moist, no oral lesions or bleeding gums.      EYES:  no scleral icterus, normal conjunctivae   Neck: no carotid bruits or thyromegaly   Chest/Lungs:   lungs are clear to auscultation, no rales or wheezing, equal chest wall expansion    Cardiovascular:   Regular. Normal first and second heart sounds with no murmurs, rubs, or gallops; Jugular venous pressure normal, no edema bilaterally    Abdomen:  no organomegaly, masses, bruits, or tenderness; bowel sounds are present   Extremities: no cyanosis or clubbing   Skin: no xanthelasma, warm.    Neurologic: normal  bilateral, no tremors     Psychiatric: alert and oriented x3    General: WNL  Eyes: WNL  Ears/Nose/Throat: WNL  Lungs: WNL  Heart: WNL  Stomach: WNL  Bladder: WNL  Muscle/Joints: WNL  Skin: WNL  Nervous System: WNL  Mental Health: WNL     Blood: WNL     Medical History  Surgical History Family History Social History   Past Medical History:   Diagnosis Date   ? Acute respiratory failure with hypoxia (H)    ? Amphetamine urine screen positive 12/31/2017   ? Asthma    ? Cellulitis     Left hand   ? Chest pain with normal CT coronary angiography 1/3/2018    ? Nonischemic cardiomyopathy (H) 1/10/2018   ? Janel Parkinson White pattern seen on electrocardiogram     Past Surgical History:   Procedure Laterality Date   ? OTHER SURGICAL HISTORY  About 10 + years ago    Cardiac surgery for Janel Parkinson at [facility]    Family History   Problem Relation Age of Onset   ? COPD Mother     Social History     Socioeconomic History   ? Marital status:      Spouse name: Not on file   ? Number of children: Not on file   ? Years of education: Not on file   ? Highest education level: Not on file   Occupational History     Employer: [employer]   Social Needs   ? Financial resource strain: Not on file   ? Food insecurity     Worry: Not on file     Inability: Not on file   ? Transportation needs     Medical: Not on file     Non-medical: Not on file   Tobacco Use   ? Smoking status: Never Smoker   ? Smokeless tobacco: Current User     Types: Chew   Substance and Sexual Activity   ? Alcohol use: Yes     Comment: 5 beers per week   ? Drug use: No   ? Sexual activity: Not on file   Lifestyle   ? Physical activity     Days per week: Not on file     Minutes per session: Not on file   ? Stress: Not on file   Relationships   ? Social connections     Talks on phone: Not on file     Gets together: Not on file     Attends Congregational service: Not on file     Active member of club or organization: Not on file     Attends meetings of clubs or organizations: Not on file     Relationship status: Not on file   ? Intimate partner violence     Fear of current or ex partner: Not on file     Emotionally abused: Not on file     Physically abused: Not on file     Forced sexual activity: Not on file   Other Topics Concern   ? Not on file   Social History Narrative   ? Not on file          Medications  Allergies   Current Outpatient Medications   Medication Sig Dispense Refill   ? aspirin 81 MG EC tablet Take 1 tablet (81 mg total) by mouth daily.  0   ? atorvastatin (LIPITOR) 40 MG tablet Take 1  tablet (40 mg total) by mouth daily. 90 tablet 0   ? carvedilol (COREG) 12.5 MG tablet Take 1 tablet (12.5 mg total) by mouth 2 (two) times a day with meals. 180 tablet 3   ? enalapril (VASOTEC) 10 MG tablet Take 1 tablet (10 mg total) by mouth daily. 90 tablet 0   ? furosemide (LASIX) 40 MG tablet Take 1 tablet (40 mg total) by mouth daily. 90 tablet 0   ? hydrOXYzine HCL (ATARAX) 25 MG tablet Take 1 tablet (25 mg total) by mouth every 6 (six) hours as needed for anxiety. 12 tablet 0   ? sertraline (ZOLOFT) 50 MG tablet Take 50 mg by mouth daily.     ? spironolactone (ALDACTONE) 25 MG tablet Take 0.5 tablets (12.5 mg total) by mouth daily. 45 tablet 0   ? VENTOLIN HFA 90 mcg/actuation inhaler Take 2 puffs by mouth every 4 (four) hours as needed.         0   ? warfarin ANTICOAGULANT (COUMADIN/JANTOVEN) 3 MG tablet Take 1 tablets (3 mg) by mouth daily. Adjust dose based on INR results as directed. 30 tablet 0     No current facility-administered medications for this visit.     No Known Allergies      Lab Results    Chemistry/lipid CBC Cardiac Enzymes/BNP/TSH/INR   Lab Results   Component Value Date    CHOL 245 (H) 03/18/2021    HDL 49 03/18/2021    LDLCALC 147 (H) 03/18/2021    TRIG 243 (H) 03/18/2021    CREATININE 0.90 03/25/2021    BUN 25 (H) 03/25/2021    K 5.0 03/25/2021     03/25/2021     03/25/2021    CO2 28 03/25/2021    Lab Results   Component Value Date    WBC 10.1 03/20/2021    HGB 16.4 03/20/2021    HCT 47.8 03/20/2021    MCV 88 03/20/2021     03/20/2021    Lab Results   Component Value Date    TROPONINI 0.03 03/18/2021     (H) 03/17/2021    TSH 1.96 04/22/2019    INR 1.13 (H) 03/22/2021             This note has been dictated using voice recognition software. Any grammatical, typographical, or context distortions are unintentional and inherent to the software    30 minutes spent on the date of encounter doing chart review, review of test results, patient visit, documentation and  discussion with family.

## 2021-07-04 NOTE — PROGRESS NOTES
Progress Notes by Grover Oliveira MD at 6/22/2021  8:10 AM     Author: Grover Oliveira MD Service: -- Author Type: Physician    Filed: 6/22/2021  1:21 PM Encounter Date: 6/22/2021 Status: Signed    : Grover Oliveira MD (Physician)             [facility] Progress Note    Assessment:  1.  Systolic heart failure with nonischemic cardiomyopathy.  Patient admitted a few months ago with acute on chronic heart failure after stopping his medications.  He has been placed back on enalapril, carvedilol, and spironolactone.  The ejection fraction was said to be 17% with the most recent echocardiogram completed May 14 of 40%.  We talked about pursuing cardiac MRI to better define ejection fraction and to reevaluate the nonischemic cardiomyopathy.  We talked about the importance of watching the salt in his diet.  His weight is down.  His BNP was only 80 on June 4 despite some increase in breathlessness.  This decreased from 99 and when he was in the hospital 153.  We are going to repeat numbers today.  We talked about the potential for Entresto although he does trend somewhat of a lower blood pressure which may make Entresto difficult to utilize.  We also talked about Jardiance and Farxiga and I have asked him check into the cost.  He is asked to continue to monitor his weights closely and to notify us with increasing symptoms of breathlessness.    2.  Diffuse expiratory wheezing on today's examination.  No obvious evidence for volume overload with flat neck veins on examination.  Question recent upper respiratory infection with productive cough.  Laboratory studies were drawn and he has an appointment with his primary care provider at 9 AM this morning.  I have asked him to update me after he sees his primary care physician.  I have requested that he ask for a chest x-ray when he sees his primary care physician.  He was somewhat in a hurry to try to make this appointment.  Will comment further  pending the repeat BMP and laboratory studies.    3.  CVA probably embolic.  On warfarin.  Plan cardiac MRI to reevaluate left ventricular apex and to further identify left ventricular function as we make further decisions about device therapy.  Patient chose not to wear her LifeVest recently but ejection fraction did increase to 40%.                Plan: 1.  As outlined above with plan follow-up in 1 month.  He will update me after he sees his primary care physician with expiratory wheezing question infectious process versus heart failure with a BNP pending and request of a chest x-ray through his primary care provider.    1. CHF (congestive heart failure) (H)  Basic metabolic panel    Magnesium    BNP(B-type Natriuretic Peptide)    MR Myocardium With Without Contrast   2. Acute decompensated heart failure (H)  spironolactone (ALDACTONE) 25 MG tablet    furosemide (LASIX) 20 MG tablet   3. Nonischemic cardiomyopathy (H)  enalapril (VASOTEC) 10 MG tablet    carvediloL (COREG) 12.5 MG tablet   4. Cerebral infarction due to embolism of right middle cerebral artery (H)  atorvastatin (LIPITOR) 40 MG tablet   5. Heart failure with reduced ejection fraction (H)     6. Chest pain with normal coronary angiography           An After Visit Summary was printed and given to the patient.    Subjective:    Matthew W Behr is a 55 y.o. male who returned for a planned  follow up visit.  He tells me he is feeling reasonably well today.  He states that on Saturday he was noting a little bit more breathless and does believe that he had some sodium indiscretion and we discussed the importance of sodium control in his diet.  He reports that a few weeks ago he developed a cough with yellow sputum without ongoing fever.  Cough persists but now of whitish sputum.  He is not reporting orthopnea or PND.  He is not reporting dizziness or tachypalpitations.  His weight is down and his furosemide dose was increased to 40 mg/20 when he last saw  the nurse practitioner.  He reports to me today that he has an appointment with his primary care physician as a relates to this concern regarding upper respiratory infection.  He is noted to be wheezy on examination.    Review of Systems:   General: WNL  Eyes: WNL  Ears/Nose/Throat: WNL  Lungs: Cough, Shortness of Breath  Heart: WNL  Stomach: WNL  Bladder: WNL  Muscle/Joints: WNL  Skin: WNL  Nervous System: WNL  Mental Health: WNL     Blood: WNL      Problem List:    Patient Active Problem List   Diagnosis   ? Mild intermittent asthma without complication   ? Wide-complex tachycardia (H)   ? Heart failure with reduced ejection fraction (H)   ? Nonischemic cardiomyopathy (H)   ? Amphetamine urine screen positive   ? Acute CHF (H)   ? Chest pain with normal coronary angiography   ? Acute on chronic systolic congestive heart failure (H)   ? Acute decompensated heart failure (H)   ? Cerebral infarction due to embolism of right middle cerebral artery (H)       Social History     Socioeconomic History   ? Marital status:      Spouse name: Not on file   ? Number of children: Not on file   ? Years of education: Not on file   ? Highest education level: Not on file   Occupational History     Employer: [employer]   Social Needs   ? Financial resource strain: Not on file   ? Food insecurity     Worry: Not on file     Inability: Not on file   ? Transportation needs     Medical: Not on file     Non-medical: Not on file   Tobacco Use   ? Smoking status: Never Smoker   ? Smokeless tobacco: Former User     Types: Chew   Substance and Sexual Activity   ? Alcohol use: Yes     Comment: 5 beers per week   ? Drug use: No   ? Sexual activity: Not on file   Lifestyle   ? Physical activity     Days per week: Not on file     Minutes per session: Not on file   ? Stress: Not on file   Relationships   ? Social connections     Talks on phone: Not on file     Gets together: Not on file     Attends Nondenominational service: Not on file     Active  "member of club or organization: Not on file     Attends meetings of clubs or organizations: Not on file     Relationship status: Not on file   ? Intimate partner violence     Fear of current or ex partner: Not on file     Emotionally abused: Not on file     Physically abused: Not on file     Forced sexual activity: Not on file   Other Topics Concern   ? Not on file   Social History Narrative   ? Not on file       Family History   Problem Relation Age of Onset   ? COPD Mother        Current Outpatient Medications   Medication Sig Dispense Refill   ? ASMANEX  mcg/actuation HFAA daily.     ? carvediloL (COREG) 12.5 MG tablet Take 1.5 tablets (18.75 mg total) by mouth 2 (two) times a day with meals. 180 tablet 3   ? furosemide (LASIX) 20 MG tablet Take 40 mg in AM and 20 mg in  tablet 3   ? hydrOXYzine HCL (ATARAX) 25 MG tablet Take 1 tablet (25 mg total) by mouth every 6 (six) hours as needed for anxiety. 12 tablet 0   ? sertraline (ZOLOFT) 50 MG tablet Take 50 mg by mouth daily.     ? spironolactone (ALDACTONE) 25 MG tablet Take 1 tablet (25 mg total) by mouth daily. 90 tablet 5   ? VENTOLIN HFA 90 mcg/actuation inhaler Take 2 puffs by mouth every 4 (four) hours as needed.         0   ? warfarin ANTICOAGULANT (COUMADIN/JANTOVEN) 3 MG tablet Take 1 tablets (3 mg) by mouth daily. Adjust dose based on INR results as directed. 30 tablet 0   ? atorvastatin (LIPITOR) 40 MG tablet Take 1 tablet (40 mg total) by mouth daily. 90 tablet 0   ? enalapril (VASOTEC) 10 MG tablet Take 1 tablet (10 mg total) by mouth daily. 90 tablet 4     No current facility-administered medications for this visit.        Objective:     /60 (Patient Site: Left Arm, Patient Position: Sitting, Cuff Size: Adult Large)   Pulse 70   Resp 14   Ht 5' 9\" (1.753 m)   Wt 191 lb (86.6 kg)   BMI 28.21 kg/m    191 lb (86.6 kg)   [unfilled]  Wt Readings from Last 3 Encounters:   06/22/21 191 lb (86.6 kg)   06/04/21 201 lb (91.2 kg) "   21 194 lb (88 kg)       Physical Exam:    GENERAL APPEARANCE: alert, no apparent distress  HEENT: no scleral icterus or xanthelasma  NECK: jugular venous pressure within normal limits on today's examination.  CHEST: symmetric, the lungs reveal diffuse wheezing.  CARDIOVASCULAR: regular rhythm without murmur, s4; no carotid bruits  Abdomen: No Organomegaly, masses, bruits, or tenderness. Bowels sounds are present      EXTREMITIES: no cyanosis, clubbing or edema    Cardiac Testing:  Reviewed By    Vane Martin CNP on 2021 15:44   Grover Oliveira MD on 2021 19:30   [employer]  Order# [id number]  Reading physician: Kelton Cheung MD (Ted) Ordering physician: Grover Oliveira MD Study date: 21   Performing Date Performing Department   May 14, 2021 [facility] [[id number]]   Patient Information    Patient Name   Behr, Matthew W MRN   [id number] Sex   Male  1   1965 (55 y.o.)   Indications    Dx: CHF (congestive heart failure) (H) [I50.9 (ICD-10-CM)]   Summary      Left ventricle ejection fraction is moderately decreased. The estimated left ventricular ejection fraction is 40%.    Normal right ventricular size and systolic function.    When compared to the previous study dated 3/18/2021, LVEF is higher        CONCLUSION:  Essentially normal Holter.  Right bundle-branch block and short runs of  primary atrial tachycardia noted.        OSEI GARCIA MD  pa  D 2018 13:07:45  T 2018 13:31:23  R 2018 13:31:23    MUSE DIAGNOSIS  ECG 12 lead MUSE  Collected: 21 1703   Result status: Final   Resulting lab: HE MUSE   Value: Normal sinus rhythm   Right bundle branch block   Abnormal ECG   When compared with ECG of 17-MAR-2021 11:10,   Premature ventricular complexes are no longer Present   Confirmed by MARIANNA SIFUENTES MD LOC:[facility] ([id number]) on 3/19/2021 8:43:23 AM        Lab Results:    Lab Results   Component Value Date     2021    K 4.3  06/04/2021     06/04/2021    CO2 25 06/04/2021    BUN 21 06/04/2021    CREATININE 0.76 06/04/2021    CALCIUM 8.6 06/04/2021     Lab Results   Component Value Date    CHOL 245 (H) 03/18/2021    TRIG 243 (H) 03/18/2021    HDL 49 03/18/2021     BNP (pg/mL)   Date Value   06/04/2021 80 (H)   04/26/2021 99 (H)   03/17/2021 553 (H)     Creatinine (mg/dL)   Date Value   06/04/2021 0.76   04/26/2021 0.82   04/08/2021 0.90   03/25/2021 0.90     LDL Calculated (mg/dL)   Date Value   03/18/2021 147 (H)   01/01/2018 77     Lab Results   Component Value Date    WBC 10.1 03/20/2021    HGB 16.4 03/20/2021    HCT 47.8 03/20/2021    MCV 88 03/20/2021     03/20/2021         This note has been dictated using voice recognition software. Any grammatical or context distortions are unintentional and inherent to the software.

## 2021-07-06 VITALS
WEIGHT: 201 LBS | HEIGHT: 69 IN | SYSTOLIC BLOOD PRESSURE: 100 MMHG | DIASTOLIC BLOOD PRESSURE: 58 MMHG | HEART RATE: 60 BPM | BODY MASS INDEX: 29.77 KG/M2 | RESPIRATION RATE: 16 BRPM

## 2021-07-06 VITALS
HEIGHT: 69 IN | BODY MASS INDEX: 28.29 KG/M2 | SYSTOLIC BLOOD PRESSURE: 106 MMHG | DIASTOLIC BLOOD PRESSURE: 60 MMHG | WEIGHT: 191 LBS | HEART RATE: 70 BPM | RESPIRATION RATE: 14 BRPM

## 2021-07-22 ENCOUNTER — HOSPITAL ENCOUNTER (OUTPATIENT)
Dept: MRI IMAGING | Facility: HOSPITAL | Age: 56
End: 2021-07-22
Attending: INTERNAL MEDICINE
Payer: COMMERCIAL

## 2021-07-22 DIAGNOSIS — I50.9 CHF (CONGESTIVE HEART FAILURE) (H): ICD-10-CM

## 2021-07-22 PROCEDURE — 75561 CARDIAC MRI FOR MORPH W/DYE: CPT | Mod: 26 | Performed by: GENERAL ACUTE CARE HOSPITAL

## 2021-07-22 PROCEDURE — 75561 CARDIAC MRI FOR MORPH W/DYE: CPT

## 2021-07-22 PROCEDURE — 255N000002 HC RX 255 OP 636: Performed by: INTERNAL MEDICINE

## 2021-07-22 PROCEDURE — A9585 GADOBUTROL INJECTION: HCPCS | Performed by: INTERNAL MEDICINE

## 2021-07-22 PROCEDURE — 999N000122 MR MYOCARDIUM  OVERREAD

## 2021-07-22 RX ORDER — GADOBUTROL 604.72 MG/ML
15 INJECTION INTRAVENOUS ONCE
Status: COMPLETED | OUTPATIENT
Start: 2021-07-22 | End: 2021-07-22

## 2021-07-22 RX ADMIN — GADOBUTROL 15 ML: 604.72 INJECTION INTRAVENOUS at 10:05

## 2021-07-28 ENCOUNTER — OFFICE VISIT (OUTPATIENT)
Dept: CARDIOLOGY | Facility: CLINIC | Age: 56
End: 2021-07-28
Payer: COMMERCIAL

## 2021-07-28 ENCOUNTER — TELEPHONE (OUTPATIENT)
Dept: CARDIOLOGY | Facility: CLINIC | Age: 56
End: 2021-07-28

## 2021-07-28 VITALS
SYSTOLIC BLOOD PRESSURE: 106 MMHG | BODY MASS INDEX: 29.42 KG/M2 | DIASTOLIC BLOOD PRESSURE: 70 MMHG | WEIGHT: 199.2 LBS | RESPIRATION RATE: 16 BRPM | HEART RATE: 64 BPM

## 2021-07-28 DIAGNOSIS — I50.20 HEART FAILURE WITH REDUCED EJECTION FRACTION (H): Primary | ICD-10-CM

## 2021-07-28 DIAGNOSIS — I42.8 NONISCHEMIC CARDIOMYOPATHY (H): ICD-10-CM

## 2021-07-28 PROBLEM — I50.9 ACUTE CHF (H): Status: RESOLVED | Noted: 2019-04-22 | Resolved: 2021-07-28

## 2021-07-28 PROCEDURE — 99214 OFFICE O/P EST MOD 30 MIN: CPT | Performed by: NURSE PRACTITIONER

## 2021-07-28 NOTE — PROGRESS NOTES
Assessment/Recommendations   Assessment:    1.  Heart failure with reduced ejection fraction, nonischemic cardiomyopathy, NYHA class II: He states that symptoms of dyspnea on exertion have improved.  He has mild fatigue.  He denies acute heart failure symptoms including orthopnea, PND, edema or abdominal distention.  He states he has gained weight due to inactivity.  He continues to have pickles and a bloody Lindsay every evening.  We discussed this today and the importance of trying to follow a low-sodium diet.  Discussed the results of his cardiac MRI.     2.  Hypertension: Controlled.  Blood pressure 106/70.  Denies dizziness or lightheadedness    3.  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    Plan:  1.   Heart failure medications:  - Beta blocker therapy with carvedilol 18.75 mg twice a day  -  ACEI therapy with enalapril 10 mg daily  - Aldosterone blocker therapy with spironolactone 25 mg daily.  Last BMP was 6/22/21 and was stable.  - Diuretic therapy with Lasix 40 mg in the morning and 20 mg in the afternoon  2.  Continue current medications due to hypotension  3.  Stressed importance of low-sodium diet, continue daily weights    Matthew W Behr will follow up with Dr. Oliveira in 2 months and in the heart failure clinic in 1 month.     History of Present Illness/Subjective    Mr. Matthew W Behr is a 55 year old male seen at [facility] heart failure clinic today for continued follow-up.  He follows up for heart failure with reduced ejection fraction, nonischemic cardiomyopathy.  He had a cardiac MRI July 22, 2021 which showed LVEF of 43% and RVEF of 39%. 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   He has mild numbness in his left hand and fingers.  He has a past medical history significant for hypertension and anxiety.    Today, he states he is feeling well.  His dyspnea on exertion and wheezing have improved.  He did see his PMD and he had bronchitis which has  resolved.  He denies any acute heart failure symptoms today.  He denies lightheadedness, shortness of breath, dyspnea on exertion, orthopnea, PND, palpitations, chest pain, abdominal fullness/bloating and lower extremity edema.      He is monitoring home weights which have increased due to inactivity.  He continues to try to follow a low-sodium diet.  He does have a Bloody Lindsay every evening.  He is very active in his work    Cardiac MRI 7/22/2021  SUMMARY   ==========================================================================================================     1.  Normal left ventricular size and wall thickness. Systolic function is mildly reduced with global  hypokinesis. The quantified left ventricular ejection fraction is 43%.   2.  Normal right ventricular size. Systolic function is mildly reduced. The quantified right ventricular  ejection fraction is 39%.   3.  There is a small area of midwall fibrosis in the basal inferolateral wall, in a nonischemic pattern.  The location and appearance is typical of the sequelae of a prior episode of myocarditis.  4.  No significant valvular abnormalities.     Physical Examination Review of Systems   /70 (BP Location: Right arm, Patient Position: Sitting, Cuff Size: Adult Regular)   Pulse 64   Resp 16   Wt 90.4 kg (199 lb 3.2 oz)   BMI 29.42 kg/m    Body mass index is 29.42 kg/m .  Wt Readings from Last 3 Encounters:   07/28/21 90.4 kg (199 lb 3.2 oz)   06/22/21 86.6 kg (191 lb)   06/04/21 91.2 kg (201 lb)       General Appearance:   no acute distress   ENT/Mouth: membranes moist, no oral lesions or bleeding gums.      EYES:  no scleral icterus, normal conjunctivae   Neck: no carotid bruits or thyromegaly   Chest/Lungs:   lungs are clear to auscultation, no rales or wheezing, equal chest wall expansion    Cardiovascular:   Regular. Normal first and second heart sounds with no murmurs, rubs, or gallops; Jugular venous pressure normal, no edema bilaterally     Abdomen:  no organomegaly, masses, bruits, or tenderness; bowel sounds are present   Extremities: no cyanosis or clubbing   Skin: no xanthelasma, warm.    Neurologic: normal  bilateral, no tremors     Psychiatric: alert and oriented x3    Enc Vitals  BP: 106/70  Pulse: 64  Resp: 16  Weight: 90.4 kg (199 lb 3.2 oz)                                         Medical History  Surgical History Family History Social History   Past Medical History:   Diagnosis Date     Acute respiratory failure with hypoxia (H)      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     Asthma      Cellulitis     Left hand     Chest pain with normal coronary angiography 1/3/2018     Nonischemic cardiomyopathy (H) 1/10/2018     Janel Parkinson White pattern seen on electrocardiogram     Past Surgical History:   Procedure Laterality Date     OTHER SURGICAL HISTORY  About 10 + years ago    OTHER SURGICAL HISTORYCardiac [facility]    Family History   Problem Relation Age of Onset     Chronic Obstructive Pulmonary Disease Mother     Social History     Socioeconomic History     Marital status:      Spouse name: Not on file     Number of children: Not on file     Years of education: Not on file     Highest education level: Not on file   Occupational History     Not on file   Tobacco Use     Smoking status: Never Smoker     Smokeless tobacco: Former User     Types: Chew   Substance and Sexual Activity     Alcohol use: Yes     Comment: Alcoholic Drinks/day: 5 beers per week     Drug use: No     Sexual activity: Not on file   Other Topics Concern     Not on file   Social History Narrative     Not on file     Social Determinants of Health     Financial Resource Strain:      Difficulty of Paying Living Expenses:    Food Insecurity:      Worried About Running Out of Food in the Last Year:      Ran Out of Food in the Last Year:    Transportation Needs:      Lack of Transportation (Medical):      Lack of Transportation  (Non-Medical):    Physical Activity:      Days of Exercise per Week:      Minutes of Exercise per Session:    Stress:      Feeling of Stress :    Social Connections:      Frequency of Communication with Friends and Family:      Frequency of Social Gatherings with Friends and Family:      Attends [employer] Services:      Active Member of Clubs or Organizations:      Attends Club or Organization Meetings:      Marital Status:    Intimate Partner Violence:      Fear of Current or Ex-Partner:      Emotionally Abused:      Physically Abused:      Sexually Abused:           Medications  Allergies   Current Outpatient Medications   Medication Sig Dispense Refill     ASMANEX  mcg/actuation HFAA [ASMANEX  MCG/ACTUATION HFAA] daily.       atorvastatin (LIPITOR) 40 MG tablet [ATORVASTATIN (LIPITOR) 40 MG TABLET] Take 1 tablet (40 mg total) by mouth daily. 90 tablet 0     carvediloL (COREG) 12.5 MG tablet [CARVEDILOL (COREG) 12.5 MG TABLET] Take 1.5 tablets (18.75 mg total) by mouth 2 (two) times a day with meals. 180 tablet 3     enalapril (VASOTEC) 10 MG tablet [ENALAPRIL (VASOTEC) 10 MG TABLET] Take 1 tablet (10 mg total) by mouth daily. 90 tablet 4     furosemide (LASIX) 20 MG tablet [FUROSEMIDE (LASIX) 20 MG TABLET] Take 40 mg in AM and 20 mg in  tablet 3     hydrOXYzine HCL (ATARAX) 25 MG tablet [HYDROXYZINE HCL (ATARAX) 25 MG TABLET] Take 1 tablet (25 mg total) by mouth every 6 (six) hours as needed for anxiety. 12 tablet 0     sertraline (ZOLOFT) 50 MG tablet [SERTRALINE (ZOLOFT) 50 MG TABLET] Take 50 mg by mouth daily.       spironolactone (ALDACTONE) 25 MG tablet [SPIRONOLACTONE (ALDACTONE) 25 MG TABLET] Take 1 tablet (25 mg total) by mouth daily. 90 tablet 5     VENTOLIN HFA 90 mcg/actuation inhaler [VENTOLIN HFA 90 MCG/ACTUATION INHALER] Take 2 puffs by mouth every 4 (four) hours as needed.         0     warfarin ANTICOAGULANT (COUMADIN/JANTOVEN) 3 MG tablet [WARFARIN ANTICOAGULANT  (COUMADIN/JANTOVEN) 3 MG TABLET] Take 1 tablets (3 mg) by mouth daily. Adjust dose based on INR results as directed. 30 tablet 0    No Known Allergies      Lab Results    Chemistry/lipid CBC Cardiac Enzymes/BNP/TSH/INR   Lab Results   Component Value Date    CHOL 245 (H) 03/18/2021    HDL 49 03/18/2021    TRIG 243 (H) 03/18/2021    BUN 25 (H) 06/22/2021     06/22/2021    CO2 24 06/22/2021    Lab Results   Component Value Date    WBC 10.1 03/20/2021    HGB 16.4 03/20/2021    HCT 47.8 03/20/2021    MCV 88 03/20/2021     03/20/2021    Lab Results   Component Value Date    TROPONINI 0.03 03/18/2021    BNP 18 06/22/2021    TSH 1.96 04/22/2019    INR 1.13 (H) 03/22/2021             This note has been dictated using voice recognition software. Any grammatical, typographical, or context distortions are unintentional and inherent to the software    30 minutes spent on the date of encounter doing chart review, review of outside records, review of test results, interpretation with above tests, patient visit and documentation.

## 2021-07-28 NOTE — TELEPHONE ENCOUNTER
----- Message from Grover Oliveira MD sent at 7/27/2021  5:16 PM CDT -----  Paradise please let him know that his ejection fraction has improved to 43%.  It is described as mild but I would describe 43% as moderate.  He was experiencing cough and wheezing but with a normal BNP.  Currently follow-up as a relates to this.  I also spoke with him about Farxiga and I believe he was going to check into the cost but I am uncertain if we have heard back.    Stevan he has had somewhat waxing and waning ejection fraction in part related to medication compliance which he seems to be doing a better job with at this point in time.  The MRI suggested the possibility of prior episode of myocarditis.  Given the ejection fraction reported 43% my sense is that he would not be a candidate for primary prevention with a defibrillator but wanted to have your input as well.  Thank you Rolly

## 2021-07-28 NOTE — PATIENT INSTRUCTIONS
Matthew W Behr,    It was a pleasure to see you today at [facility].     My recommendations after this visit include:  - Please follow up with Dr Oliveira in 8 weeks   - Please follow up with Vane Martin in 4 weeks  - Continue current medications    Vane Martin, CNP

## 2021-07-28 NOTE — Clinical Note
Saw patient today for follow-up and doing very well. His LUQUE and wheezing has resolved and was diagnosed with bronchitis by his PMD. No changes due to continued lower BP's.

## 2021-07-28 NOTE — LETTER
7/28/2021    Segundo Kaufman MD  [facility] [address]  [place]  [place] [id number]    RE: Matthew W Behr       Dear Colleague,    I had the pleasure of seeing Matthew W Behr in the [facility].          Assessment/Recommendations   Assessment:    1.  Heart failure with reduced ejection fraction, nonischemic cardiomyopathy, NYHA class II: He states that symptoms of dyspnea on exertion have improved.  He has mild fatigue.  He denies acute heart failure symptoms including orthopnea, PND, edema or abdominal distention.  He states he has gained weight due to inactivity.  He continues to have pickles and a bloody Lindsay every evening.  We discussed this today and the importance of trying to follow a low-sodium diet.  Discussed the results of his cardiac MRI.     2.  Hypertension: Controlled.  Blood pressure 106/70.  Denies dizziness or lightheadedness    3.  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    Plan:  1.   Heart failure medications:  - Beta blocker therapy with carvedilol 18.75 mg twice a day  -  ACEI therapy with enalapril 10 mg daily  - Aldosterone blocker therapy with spironolactone 25 mg daily.  Last BMP was 6/22/21 and was stable.  - Diuretic therapy with Lasix 40 mg in the morning and 20 mg in the afternoon  2.  Continue current medications due to hypotension  3.  Stressed importance of low-sodium diet, continue daily weights    Matthew W Behr will follow up with Dr. Oliveira in 2 months and in the heart failure clinic in 1 month.     History of Present Illness/Subjective    Mr. Matthew W Behr is a 55 year old male seen at [facility] heart failure clinic today for continued follow-up.  He follows up for heart failure with reduced ejection fraction, nonischemic cardiomyopathy.  He had a cardiac MRI July 22, 2021 which showed LVEF of 43% and RVEF of 39%. He was hospitalized with acute heart failure in March and suffered a CVA to the right  [facility].   He has mild numbness in his left hand and fingers.  He has a past medical history significant for hypertension and anxiety.    Today, he states he is feeling well.  His dyspnea on exertion and wheezing have improved.  He did see his PMD and he had bronchitis which has resolved.  He denies any acute heart failure symptoms today.  He denies lightheadedness, shortness of breath, dyspnea on exertion, orthopnea, PND, palpitations, chest pain, abdominal fullness/bloating and lower extremity edema.      He is monitoring home weights which have increased due to inactivity.  He continues to try to follow a low-sodium diet.  He does have a Bloody Lindsay every evening.  He is very active in his work    Cardiac MRI 7/22/2021  SUMMARY   ==========================================================================================================     1.  Normal left ventricular size and wall thickness. Systolic function is mildly reduced with global  hypokinesis. The quantified left ventricular ejection fraction is 43%.   2.  Normal right ventricular size. Systolic function is mildly reduced. The quantified right ventricular  ejection fraction is 39%.   3.  There is a small area of midwall fibrosis in the basal inferolateral wall, in a nonischemic pattern.  The location and appearance is typical of the sequelae of a prior episode of myocarditis.  4.  No significant valvular abnormalities.     Physical Examination Review of Systems   /70 (BP Location: Right arm, Patient Position: Sitting, Cuff Size: Adult Regular)   Pulse 64   Resp 16   Wt 90.4 kg (199 lb 3.2 oz)   BMI 29.42 kg/m    Body mass index is 29.42 kg/m .  Wt Readings from Last 3 Encounters:   07/28/21 90.4 kg (199 lb 3.2 oz)   06/22/21 86.6 kg (191 lb)   06/04/21 91.2 kg (201 lb)       General Appearance:   no acute distress   ENT/Mouth: membranes moist, no oral lesions or bleeding gums.      EYES:  no scleral icterus, normal conjunctivae   Neck: no carotid  bruits or thyromegaly   Chest/Lungs:   lungs are clear to auscultation, no rales or wheezing, equal chest wall expansion    Cardiovascular:   Regular. Normal first and second heart sounds with no murmurs, rubs, or gallops; Jugular venous pressure normal, no edema bilaterally    Abdomen:  no organomegaly, masses, bruits, or tenderness; bowel sounds are present   Extremities: no cyanosis or clubbing   Skin: no xanthelasma, warm.    Neurologic: normal  bilateral, no tremors     Psychiatric: alert and oriented x3    Enc Vitals  BP: 106/70  Pulse: 64  Resp: 16  Weight: 90.4 kg (199 lb 3.2 oz)                                         Medical History  Surgical History Family History Social History   Past Medical History:   Diagnosis Date     Acute respiratory failure with hypoxia (H)      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     Asthma      Cellulitis     Left hand     Chest pain with normal coronary angiography 1/3/2018     Nonischemic cardiomyopathy (H) 1/10/2018     Janel Parkinson White pattern seen on electrocardiogram     Past Surgical History:   Procedure Laterality Date     OTHER SURGICAL HISTORY  About 10 + years ago    OTHER SURGICAL HISTORYCardiac [facility]    Family History   Problem Relation Age of Onset     Chronic Obstructive Pulmonary Disease Mother     Social History     Socioeconomic History     Marital status:      Spouse name: Not on file     Number of children: Not on file     Years of education: Not on file     Highest education level: Not on file   Occupational History     Not on file   Tobacco Use     Smoking status: Never Smoker     Smokeless tobacco: Former User     Types: Chew   Substance and Sexual Activity     Alcohol use: Yes     Comment: Alcoholic Drinks/day: 5 beers per week     Drug use: No     Sexual activity: Not on file   Other Topics Concern     Not on file   Social History Narrative     Not on file     Social Determinants of Health     Financial  Resource Strain:      Difficulty of Paying Living Expenses:    Food Insecurity:      Worried About Running Out of Food in the Last Year:      Ran Out of Food in the Last Year:    Transportation Needs:      Lack of Transportation (Medical):      Lack of Transportation (Non-Medical):    Physical Activity:      Days of Exercise per Week:      Minutes of Exercise per Session:    Stress:      Feeling of Stress :    Social Connections:      Frequency of Communication with Friends and Family:      Frequency of Social Gatherings with Friends and Family:      Attends Mosque Services:      Active Member of Clubs or Organizations:      Attends Club or Organization Meetings:      Marital Status:    Intimate Partner Violence:      Fear of Current or Ex-Partner:      Emotionally Abused:      Physically Abused:      Sexually Abused:           Medications  Allergies   Current Outpatient Medications   Medication Sig Dispense Refill     ASMANEX  mcg/actuation HFAA [ASMANEX  MCG/ACTUATION HFAA] daily.       atorvastatin (LIPITOR) 40 MG tablet [ATORVASTATIN (LIPITOR) 40 MG TABLET] Take 1 tablet (40 mg total) by mouth daily. 90 tablet 0     carvediloL (COREG) 12.5 MG tablet [CARVEDILOL (COREG) 12.5 MG TABLET] Take 1.5 tablets (18.75 mg total) by mouth 2 (two) times a day with meals. 180 tablet 3     enalapril (VASOTEC) 10 MG tablet [ENALAPRIL (VASOTEC) 10 MG TABLET] Take 1 tablet (10 mg total) by mouth daily. 90 tablet 4     furosemide (LASIX) 20 MG tablet [FUROSEMIDE (LASIX) 20 MG TABLET] Take 40 mg in AM and 20 mg in  tablet 3     hydrOXYzine HCL (ATARAX) 25 MG tablet [HYDROXYZINE HCL (ATARAX) 25 MG TABLET] Take 1 tablet (25 mg total) by mouth every 6 (six) hours as needed for anxiety. 12 tablet 0     sertraline (ZOLOFT) 50 MG tablet [SERTRALINE (ZOLOFT) 50 MG TABLET] Take 50 mg by mouth daily.       spironolactone (ALDACTONE) 25 MG tablet [SPIRONOLACTONE (ALDACTONE) 25 MG TABLET] Take 1 tablet (25 mg total) by  mouth daily. 90 tablet 5     VENTOLIN HFA 90 mcg/actuation inhaler [VENTOLIN HFA 90 MCG/ACTUATION INHALER] Take 2 puffs by mouth every 4 (four) hours as needed.         0     warfarin ANTICOAGULANT (COUMADIN/JANTOVEN) 3 MG tablet [WARFARIN ANTICOAGULANT (COUMADIN/JANTOVEN) 3 MG TABLET] Take 1 tablets (3 mg) by mouth daily. Adjust dose based on INR results as directed. 30 tablet 0    No Known Allergies      Lab Results    Chemistry/lipid CBC Cardiac Enzymes/BNP/TSH/INR   Lab Results   Component Value Date    CHOL 245 (H) 03/18/2021    HDL 49 03/18/2021    TRIG 243 (H) 03/18/2021    BUN 25 (H) 06/22/2021     06/22/2021    CO2 24 06/22/2021    Lab Results   Component Value Date    WBC 10.1 03/20/2021    HGB 16.4 03/20/2021    HCT 47.8 03/20/2021    MCV 88 03/20/2021     03/20/2021    Lab Results   Component Value Date    TROPONINI 0.03 03/18/2021    BNP 18 06/22/2021    TSH 1.96 04/22/2019    INR 1.13 (H) 03/22/2021             This note has been dictated using voice recognition software. Any grammatical, typographical, or context distortions are unintentional and inherent to the software    30 minutes spent on the date of encounter doing chart review, review of outside records, review of test results, interpretation with above tests, patient visit and documentation.                  Thank you for allowing me to participate in the care of your patient.      Sincerely,     RANI [facility]  cc:   No referring provider defined for this encounter.

## 2021-09-17 DIAGNOSIS — I63.411 CEREBRAL INFARCTION DUE TO EMBOLISM OF RIGHT MIDDLE CEREBRAL ARTERY (H): ICD-10-CM

## 2021-09-17 RX ORDER — ATORVASTATIN CALCIUM 40 MG/1
40 TABLET, FILM COATED ORAL DAILY
Qty: 90 TABLET | Refills: 0 | Status: SHIPPED | OUTPATIENT
Start: 2021-09-17 | End: 2022-01-28

## 2021-09-29 NOTE — PROGRESS NOTES
HEART CARE ENCOUNTER CONSULTATON NOTE      [facility]  [phone]      Assessment/Recommendations   Assessment/Plan:  1.  Heart failure with reduced ejection fraction.  Nonischemic cardiomyopathy.  Patient admitted with worsening heart failure symptoms after stopping his medications March 2021.  He continues to report feeling better.  BNP a few months ago was 18.  We discussed avoiding heavy lifting activities getting enough rest.  We discussed potential up titration of carvedilol but he was reluctant in part related to his heart rate in the 50s.  My recommendation was to consider wearing a Holter monitor to further define his heart rate but at this point he wished not to pursue that.  Further recommendations regarding up titration of medications ending laboratory studies today.  His blood pressure was a little higher today which may allow us to further uptitrate the enalapril.  MRI July 2021 demonstrated ejection fraction of 43% and right ventricular function of 39% with a small area of mid wall fibrosis assistant with a nonischemic pattern.  CT coronary angiogram in 2018 demonstrated a calcium score of 0 and no obstructive coronary artery disease identified.    2.  CVA.  Probably embolic.  On warfarin.  No thrombus identified on the MRI.  We will need to further decide regarding warfarin and may be useful to have him visit with neurology.    1.  Laboratory studies today.  2.  Medications renewed including furosemide and spironolactone.  3.  Further discussion of further up titration of medications pending laboratory studies.  4.  Follow-up in 3 to 4 months.       History of Present Illness/Subjective    HPI: Matthew W Behr is a 56 year old male who is seen in follow-up.  He reports that he has been feeling well.  He states that he is back to work full-time and does have a physically demanding job.  We did discuss being prudent with respect to pushing himself too hard and avoiding heavy  lifting.  He is not describing orthopnea or PND.  He has not describing dizziness or lightheadedness.  He does state that his weight can fluctuate frequently but he does monitor his salt in his diet.  He does not monitor his blood pressure and pulse and I suggested that it might be reasonable to have a pressure cuff but not certain that he was going to pursue this.  I reviewed again the importance of his medications.  I talked about potentially increasing the carvedilol to 25 mg p.o. twice daily although his resting heart rate when he arrived was in the 50s.  I discussed whether we would consider utilizing a Holter monitor to further define his heart rate but at this point he declined.  He also states that on his own he decreased the furosemide to 20 mg p.o. twice daily and has tolerated this change.    Recent Echocardiogram Results:  Results  Echocardiogram Limited (Order [id number])    External Result Report    External Result Report     Echocardiogram Limited  Order: [id number]  Status:  Final result   Visible to patient:  Yes (MyChart) Next appt:  09/30/2021 at 07:50 AM in Cardiology (Grover Oliveira MD) Dx:  CHF (congestive heart failure) (H)   1 Result Note      Details    Reading Physician Reading Date Result Priority   Kuldip Cheung MD  [phone] 5/14/2021 Routine   Provider, Historical 5/14/2021         Narrative & Impression    Left ventricle ejection fraction is moderately decreased. The estimated left ventricular ejection fraction is 40%.    Normal right ventricular size and systolic function.    When compared to the previous study dated 3/18/2021, LVEF is higher              Recent Coronary Angiogram Results:    7/22/21  9:37 AM 7/22/21 10:35 AM [id number] [facility]        PACS Images     Show images for MR Cardiac with Contrast       Study Result    Narrative & Impression                                                    [facility]                                                       CMR Report       MRN:                  [id number]                                  Name:            BEHR, MATTHEW W                                  :                  1965                                  Scan Date:   2021 09:37:12                                   Electronically signed by Maxime Hernandez  14:19:47     VITALS   ==========================================================================================================     HEIGHT: 69.02 in    (175.30 cm)  WEIGHT: 191.01 lbs    (86.64 kgs)  BSA: 2.03 m^2     SUMMARY   ==========================================================================================================     1.  Normal left ventricular size and wall thickness. Systolic function is mildly reduced with global  hypokinesis. The quantified left ventricular ejection fraction is 43%.   2.  Normal right ventricular size. Systolic function is mildly reduced. The quantified right ventricular  ejection fraction is 39%.   3.  There is a small area of midwall fibrosis in the basal inferolateral wall, in a nonischemic pattern.  The location and appearance is typical of the sequelae of a prior episode of myocarditis.  4.  No significant valvular abnormalities.               Physical Examination  Review of Systems   Vitals: There were no vitals taken for this visit.  BMI= There is no height or weight on file to calculate BMI.  Wt Readings from Last 3 Encounters:   21 90.4 kg (199 lb 3.2 oz)   21 86.6 kg (191 lb)   21 91.2 kg (201 lb)       General Appearance:   no distress, normal body habitus   ENT/Mouth: membranes moist, no oral lesions or bleeding gums.      EYES:  no scleral icterus, normal conjunctivae   Neck: no carotid bruits or thyromegaly   Chest/Lungs:   lungs are mildly diminished, no rales or wheezing, , equal chest wall expansion    Cardiovascular:   Regular occasional extrasystole. Normal first and second heart sounds with no murmurs,  rubs, S4; the carotid, radial and posterior tibial pulses are intact, Jugular venous pressure within normal limits, no edema bilaterally    Abdomen:  no organomegaly, masses, bruits, or tenderness; bowel sounds are present   Extremities: no cyanosis or clubbing   Skin: no xanthelasma, warm.    Neurologic: normal  bilateral, no tremors     Psychiatric: alert and oriented x3, calm        Please refer above for cardiac ROS details.        Medical History  Surgical History Family History Social History   Past Medical History:   Diagnosis Date     Acute respiratory failure with hypoxia (H)      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     Asthma      Cellulitis     Left hand     Chest pain with normal coronary angiography 1/3/2018     Nonischemic cardiomyopathy (H) 1/10/2018     Janel Parkinson White pattern seen on electrocardiogram      Past Surgical History:   Procedure Laterality Date     OTHER SURGICAL HISTORY  About 10 + years ago    OTHER SURGICAL HISTORYCardiac [facility]     Family History   Problem Relation Age of Onset     Chronic Obstructive Pulmonary Disease Mother         Social History     Socioeconomic History     Marital status:      Spouse name: Not on file     Number of children: Not on file     Years of education: Not on file     Highest education level: Not on file   Occupational History     Not on file   Tobacco Use     Smoking status: Never Smoker     Smokeless tobacco: Former User     Types: Chew   Substance and Sexual Activity     Alcohol use: Yes     Comment: Alcoholic Drinks/day: 5 beers per week     Drug use: No     Sexual activity: Not on file   Other Topics Concern     Not on file   Social History Narrative     Not on file     Social Determinants of Health     Financial Resource Strain:      Difficulty of Paying Living Expenses:    Food Insecurity:      Worried About Running Out of Food in the Last Year:      Ran Out of Food in the Last Year:     Transportation Needs:      Lack of Transportation (Medical):      Lack of Transportation (Non-Medical):    Physical Activity:      Days of Exercise per Week:      Minutes of Exercise per Session:    Stress:      Feeling of Stress :    Social Connections:      Frequency of Communication with Friends and Family:      Frequency of Social Gatherings with Friends and Family:      Attends Latter-day Services:      Active Member of Clubs or Organizations:      Attends Club or Organization Meetings:      Marital Status:    Intimate Partner Violence:      Fear of Current or Ex-Partner:      Emotionally Abused:      Physically Abused:      Sexually Abused:            Medications  Allergies   Current Outpatient Medications   Medication Sig Dispense Refill     ASMANEX  mcg/actuation HFAA [ASMANEX  MCG/ACTUATION HFAA] daily.       atorvastatin (LIPITOR) 40 MG tablet Take 1 tablet (40 mg) by mouth daily 90 tablet 0     carvediloL (COREG) 12.5 MG tablet [CARVEDILOL (COREG) 12.5 MG TABLET] Take 1.5 tablets (18.75 mg total) by mouth 2 (two) times a day with meals. 180 tablet 3     enalapril (VASOTEC) 10 MG tablet [ENALAPRIL (VASOTEC) 10 MG TABLET] Take 1 tablet (10 mg total) by mouth daily. 90 tablet 4     furosemide (LASIX) 20 MG tablet [FUROSEMIDE (LASIX) 20 MG TABLET] Take 40 mg in AM and 20 mg in  tablet 3     hydrOXYzine HCL (ATARAX) 25 MG tablet [HYDROXYZINE HCL (ATARAX) 25 MG TABLET] Take 1 tablet (25 mg total) by mouth every 6 (six) hours as needed for anxiety. 12 tablet 0     sertraline (ZOLOFT) 50 MG tablet [SERTRALINE (ZOLOFT) 50 MG TABLET] Take 50 mg by mouth daily.       spironolactone (ALDACTONE) 25 MG tablet [SPIRONOLACTONE (ALDACTONE) 25 MG TABLET] Take 1 tablet (25 mg total) by mouth daily. 90 tablet 5     VENTOLIN HFA 90 mcg/actuation inhaler [VENTOLIN HFA 90 MCG/ACTUATION INHALER] Take 2 puffs by mouth every 4 (four) hours as needed.         0     warfarin ANTICOAGULANT (COUMADIN/JANTOVEN) 3  MG tablet [WARFARIN ANTICOAGULANT (COUMADIN/JANTOVEN) 3 MG TABLET] Take 1 tablets (3 mg) by mouth daily. Adjust dose based on INR results as directed. 30 tablet 0     No Known Allergies       Lab Results    Chemistry/lipid CBC Cardiac Enzymes/BNP/TSH/INR   Recent Labs   Lab Test 03/18/21  1544   CHOL 245*   HDL 49   *   TRIG 243*     Recent Labs   Lab Test 03/18/21  1544 01/01/18  0544   * 77     Recent Labs   Lab Test 06/22/21  0843      POTASSIUM 4.6   CHLORIDE 105   CO2 24   GLC 94   BUN 25*   CR 0.90   GFRESTIMATED >60   ELLIS 9.0     Recent Labs   Lab Test 06/22/21  0843 06/04/21  0905 04/26/21  1213   CR 0.90 0.76 0.82     No results for input(s): A1C in the last 64116 hours.       Recent Labs   Lab Test 03/20/21  0439   WBC 10.1   HGB 16.4   HCT 47.8   MCV 88        Recent Labs   Lab Test 03/20/21  0439 03/19/21  0420 03/18/21  1544   HGB 16.4 17.2 17.2    Recent Labs   Lab Test 03/18/21  1544 03/17/21  1118 03/15/21  0832   TROPONINI 0.03 0.01 0.04     Recent Labs   Lab Test 06/22/21  0843 06/04/21  0905 04/26/21  1213   BNP 18 80* 99*     Recent Labs   Lab Test 04/22/19  1924   TSH 1.96     Recent Labs   Lab Test 03/22/21  0733 03/21/21  0451 03/18/21  1544   INR 1.13* 1.05 1.06        Grover Oliveira MD

## 2021-09-30 ENCOUNTER — OFFICE VISIT (OUTPATIENT)
Dept: CARDIOLOGY | Facility: CLINIC | Age: 56
End: 2021-09-30
Payer: COMMERCIAL

## 2021-09-30 VITALS
WEIGHT: 205 LBS | BODY MASS INDEX: 30.27 KG/M2 | RESPIRATION RATE: 14 BRPM | HEART RATE: 54 BPM | SYSTOLIC BLOOD PRESSURE: 122 MMHG | DIASTOLIC BLOOD PRESSURE: 70 MMHG

## 2021-09-30 DIAGNOSIS — I50.9 ACUTE DECOMPENSATED HEART FAILURE (H): ICD-10-CM

## 2021-09-30 DIAGNOSIS — I50.22 CHRONIC SYSTOLIC CONGESTIVE HEART FAILURE (H): Primary | ICD-10-CM

## 2021-09-30 LAB
ANION GAP SERPL CALCULATED.3IONS-SCNC: 8 MMOL/L (ref 5–18)
BNP SERPL-MCNC: 79 PG/ML (ref 0–46)
BUN SERPL-MCNC: 18 MG/DL (ref 8–22)
CALCIUM SERPL-MCNC: 9 MG/DL (ref 8.5–10.5)
CHLORIDE BLD-SCNC: 108 MMOL/L (ref 98–107)
CO2 SERPL-SCNC: 24 MMOL/L (ref 22–31)
CREAT SERPL-MCNC: 0.71 MG/DL (ref 0.7–1.3)
GFR SERPL CREATININE-BSD FRML MDRD: >90 ML/MIN/1.73M2
GLUCOSE BLD-MCNC: 97 MG/DL (ref 70–125)
MAGNESIUM SERPL-MCNC: 1.9 MG/DL (ref 1.8–2.6)
POTASSIUM BLD-SCNC: 4.4 MMOL/L (ref 3.5–5)
SODIUM SERPL-SCNC: 140 MMOL/L (ref 136–145)

## 2021-09-30 PROCEDURE — 83735 ASSAY OF MAGNESIUM: CPT | Performed by: INTERNAL MEDICINE

## 2021-09-30 PROCEDURE — 99214 OFFICE O/P EST MOD 30 MIN: CPT | Performed by: INTERNAL MEDICINE

## 2021-09-30 PROCEDURE — 80048 BASIC METABOLIC PNL TOTAL CA: CPT | Performed by: INTERNAL MEDICINE

## 2021-09-30 PROCEDURE — 83880 ASSAY OF NATRIURETIC PEPTIDE: CPT | Performed by: INTERNAL MEDICINE

## 2021-09-30 PROCEDURE — 36415 COLL VENOUS BLD VENIPUNCTURE: CPT | Performed by: INTERNAL MEDICINE

## 2021-09-30 RX ORDER — SPIRONOLACTONE 25 MG/1
25 TABLET ORAL DAILY
Qty: 90 TABLET | Refills: 5 | Status: SHIPPED | OUTPATIENT
Start: 2021-09-30 | End: 2022-01-28

## 2021-09-30 RX ORDER — FUROSEMIDE 20 MG
20 TABLET ORAL 2 TIMES DAILY
Qty: 180 TABLET | Refills: 3 | Status: SHIPPED | OUTPATIENT
Start: 2021-09-30 | End: 2022-01-28

## 2021-09-30 NOTE — LETTER
November 3, 2021      Antonio HOLLIDAY Behr  [address]   [place] [address]        Dear Mr.Behr,    We are writing to inform you of your test results.    Your lab work overall looks good.  That BNP number that we talked about is mildly elevated suggesting perhaps a trend towards mild increase in fluid retention compared to previous.  I would say lets keep the furosemide where it is and monitor closely for any fluid retention or shortness of breath.    Resulted Orders   Basic metabolic panel   Result Value Ref Range    Sodium 140 136 - 145 mmol/L    Potassium 4.4 3.5 - 5.0 mmol/L    Chloride 108 (H) 98 - 107 mmol/L    Carbon Dioxide (CO2) 24 22 - 31 mmol/L    Anion Gap 8 5 - 18 mmol/L    Urea Nitrogen 18 8 - 22 mg/dL    Creatinine 0.71 0.70 - 1.30 mg/dL    Calcium 9.0 8.5 - 10.5 mg/dL    Glucose 97 70 - 125 mg/dL    GFR Estimate >90 >60 mL/min/1.73m2      Comment:      As of July 11, 2021, eGFR is calculated by the CKD-EPI creatinine equation, without race adjustment. eGFR can be influenced by muscle mass, exercise, and diet. The reported eGFR is an estimation only and is only applicable if the renal function is stable.   Magnesium   Result Value Ref Range    Magnesium 1.9 1.8 - 2.6 mg/dL   B-Type Natriuretic Peptide ( East Only)   Result Value Ref Range    BNP 79 (H) 0 - 46 pg/mL       If you have any questions or concerns, please call the clinic at the number listed above.       Sincerely,      Grover Oliveira MD

## 2021-09-30 NOTE — LETTER
9/30/2021    Segundo Kaufman MD  [facility] [address]  [place]  [place] [id number]    RE: Matthew W Behr       Dear Colleague,    I had the pleasure of seeing Matthew W Behr in the [facility].      HEART CARE ENCOUNTER CONSULTATON NOTE      [facility]  [phone]      Assessment/Recommendations   Assessment/Plan:  1.  Heart failure with reduced ejection fraction.  Nonischemic cardiomyopathy.  Patient admitted with worsening heart failure symptoms after stopping his medications March 2021.  He continues to report feeling better.  BNP a few months ago was 18.  We discussed avoiding heavy lifting activities getting enough rest.  We discussed potential up titration of carvedilol but he was reluctant in part related to his heart rate in the 50s.  My recommendation was to consider wearing a Holter monitor to further define his heart rate but at this point he wished not to pursue that.  Further recommendations regarding up titration of medications ending laboratory studies today.  His blood pressure was a little higher today which may allow us to further uptitrate the enalapril.  MRI July 2021 demonstrated ejection fraction of 43% and right ventricular function of 39% with a small area of mid wall fibrosis assistant with a nonischemic pattern.  CT coronary angiogram in 2018 demonstrated a calcium score of 0 and no obstructive coronary artery disease identified.    2.  CVA.  Probably embolic.  On warfarin.  No thrombus identified on the MRI.  We will need to further decide regarding warfarin and may be useful to have him visit with neurology.    1.  Laboratory studies today.  2.  Medications renewed including furosemide and spironolactone.  3.  Further discussion of further up titration of medications pending laboratory studies.  4.  Follow-up in 3 to 4 months.       History of Present Illness/Subjective    HPI: Antonio HOLLIDAY  Behr is a 56 year old male who is seen in follow-up.  He reports that he has been feeling well.  He states that he is back to work full-time and does have a physically demanding job.  We did discuss being prudent with respect to pushing himself too hard and avoiding heavy lifting.  He is not describing orthopnea or PND.  He has not describing dizziness or lightheadedness.  He does state that his weight can fluctuate frequently but he does monitor his salt in his diet.  He does not monitor his blood pressure and pulse and I suggested that it might be reasonable to have a pressure cuff but not certain that he was going to pursue this.  I reviewed again the importance of his medications.  I talked about potentially increasing the carvedilol to 25 mg p.o. twice daily although his resting heart rate when he arrived was in the 50s.  I discussed whether we would consider utilizing a Holter monitor to further define his heart rate but at this point he declined.  He also states that on his own he decreased the furosemide to 20 mg p.o. twice daily and has tolerated this change.    Recent Echocardiogram Results:  Results  Echocardiogram Limited (Order [id number])    External Result Report    External Result Report     Echocardiogram Limited  Order: [id number]  Status:  Final result   Visible to patient:  Yes (MyChart) Next appt:  09/30/2021 at 07:50 AM in Cardiology (Grover Oliveira MD) Dx:  CHF (congestive heart failure) (H)   1 Result Note      Details    Reading Physician Reading Date Result Priority   Kuldip Cheung MD  [phone] 5/14/2021 Routine   Provider, Historical 5/14/2021         Narrative & Impression    Left ventricle ejection fraction is moderately decreased. The estimated left ventricular ejection fraction is 40%.    Normal right ventricular size and systolic function.    When compared to the previous study dated 3/18/2021, LVEF is higher              Recent Coronary Angiogram Results:    7/22/21  9:37 AM  21 10:35 AM [id number] [facility]        PACS Images     Show images for MR Cardiac with Contrast       Study Result    Narrative & Impression                                                    [facility]                                                      CMR Report       MRN:                  [id number]                                  Name:            BEHR, MATTHEW W                                  :                  1965                                  Scan Date:   2021 09:37:12                                   Electronically signed by Maxime Hernandez  14:19:47     VITALS   ==========================================================================================================     HEIGHT: 69.02 in    (175.30 cm)  WEIGHT: 191.01 lbs    (86.64 kgs)  BSA: 2.03 m^2     SUMMARY   ==========================================================================================================     1.  Normal left ventricular size and wall thickness. Systolic function is mildly reduced with global  hypokinesis. The quantified left ventricular ejection fraction is 43%.   2.  Normal right ventricular size. Systolic function is mildly reduced. The quantified right ventricular  ejection fraction is 39%.   3.  There is a small area of midwall fibrosis in the basal inferolateral wall, in a nonischemic pattern.  The location and appearance is typical of the sequelae of a prior episode of myocarditis.  4.  No significant valvular abnormalities.               Physical Examination  Review of Systems   Vitals: There were no vitals taken for this visit.  BMI= There is no height or weight on file to calculate BMI.  Wt Readings from Last 3 Encounters:   21 90.4 kg (199 lb 3.2 oz)   21 86.6 kg (191 lb)   21 91.2 kg (201 lb)       General Appearance:   no distress, normal body habitus   ENT/Mouth: membranes moist, no oral lesions or bleeding gums.      EYES:  no  scleral icterus, normal conjunctivae   Neck: no carotid bruits or thyromegaly   Chest/Lungs:   lungs are mildly diminished, no rales or wheezing, , equal chest wall expansion    Cardiovascular:   Regular occasional extrasystole. Normal first and second heart sounds with no murmurs, rubs, S4; the carotid, radial and posterior tibial pulses are intact, Jugular venous pressure within normal limits, no edema bilaterally    Abdomen:  no organomegaly, masses, bruits, or tenderness; bowel sounds are present   Extremities: no cyanosis or clubbing   Skin: no xanthelasma, warm.    Neurologic: normal  bilateral, no tremors     Psychiatric: alert and oriented x3, calm        Please refer above for cardiac ROS details.        Medical History  Surgical History Family History Social History   Past Medical History:   Diagnosis Date     Acute respiratory failure with hypoxia (H)      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     Asthma      Cellulitis     Left hand     Chest pain with normal coronary angiography 1/3/2018     Nonischemic cardiomyopathy (H) 1/10/2018     Janel Parkinson White pattern seen on electrocardiogram      Past Surgical History:   Procedure Laterality Date     OTHER SURGICAL HISTORY  About 10 + years ago    OTHER [facility]GICAL [facility]ORY[facility]     Family History   Problem Relation Age of Onset     Chronic Obstructive Pulmonary Disease Mother         Social History     Socioeconomic History     Marital status:      Spouse name: Not on file     Number of children: Not on file     Years of education: Not on file     Highest education level: Not on file   Occupational History     Not on file   Tobacco Use     Smoking status: Never Smoker     Smokeless tobacco: Former User     Types: Chew   Substance and Sexual Activity     Alcohol use: Yes     Comment: Alcoholic Drinks/day: 5 beers per week     Drug use: No     Sexual activity: Not on file   Other Topics Concern     Not on file    Social History Narrative     Not on file     Social Determinants of Health     Financial Resource Strain:      Difficulty of Paying Living Expenses:    Food Insecurity:      Worried About Running Out of Food in the Last Year:      Ran Out of Food in the Last Year:    Transportation Needs:      Lack of Transportation (Medical):      Lack of Transportation (Non-Medical):    Physical Activity:      Days of Exercise per Week:      Minutes of Exercise per Session:    Stress:      Feeling of Stress :    Social Connections:      Frequency of Communication with Friends and Family:      Frequency of Social Gatherings with Friends and Family:      Attends Je[employer] witness Services:      Active Member of Clubs or Organizations:      Attends Club or Organization Meetings:      Marital Status:    Intimate Partner Violence:      Fear of Current or Ex-Partner:      Emotionally Abused:      Physically Abused:      Sexually Abused:            Medications  Allergies   Current Outpatient Medications   Medication Sig Dispense Refill     ASMANEX  mcg/actuation HFAA [ASMANEX  MCG/ACTUATION HFAA] daily.       atorvastatin (LIPITOR) 40 MG tablet Take 1 tablet (40 mg) by mouth daily 90 tablet 0     carvediloL (COREG) 12.5 MG tablet [CARVEDILOL (COREG) 12.5 MG TABLET] Take 1.5 tablets (18.75 mg total) by mouth 2 (two) times a day with meals. 180 tablet 3     enalapril (VASOTEC) 10 MG tablet [ENALAPRIL (VASOTEC) 10 MG TABLET] Take 1 tablet (10 mg total) by mouth daily. 90 tablet 4     furosemide (LASIX) 20 MG tablet [FUROSEMIDE (LASIX) 20 MG TABLET] Take 40 mg in AM and 20 mg in  tablet 3     hydrOXYzine HCL (ATARAX) 25 MG tablet [HYDROXYZINE HCL (ATARAX) 25 MG TABLET] Take 1 tablet (25 mg total) by mouth every 6 (six) hours as needed for anxiety. 12 tablet 0     sertraline (ZOLOFT) 50 MG tablet [SERTRALINE (ZOLOFT) 50 MG TABLET] Take 50 mg by mouth daily.       spironolactone (ALDACTONE) 25 MG tablet [SPIRONOLACTONE  (ALDACTONE) 25 MG TABLET] Take 1 tablet (25 mg total) by mouth daily. 90 tablet 5     VENTOLIN HFA 90 mcg/actuation inhaler [VENTOLIN HFA 90 MCG/ACTUATION INHALER] Take 2 puffs by mouth every 4 (four) hours as needed.         0     warfarin ANTICOAGULANT (COUMADIN/JANTOVEN) 3 MG tablet [WARFARIN ANTICOAGULANT (COUMADIN/JANTOVEN) 3 MG TABLET] Take 1 tablets (3 mg) by mouth daily. Adjust dose based on INR results as directed. 30 tablet 0     No Known Allergies       Lab Results    Chemistry/lipid CBC Cardiac Enzymes/BNP/TSH/INR   Recent Labs   Lab Test 03/18/21  1544   CHOL 245*   HDL 49   *   TRIG 243*     Recent Labs   Lab Test 03/18/21  1544 01/01/18  0544   * 77     Recent Labs   Lab Test 06/22/21  0843      POTASSIUM 4.6   CHLORIDE 105   CO2 24   GLC 94   BUN 25*   CR 0.90   GFRESTIMATED >60   ELLIS 9.0     Recent Labs   Lab Test 06/22/21  0843 06/04/21  0905 04/26/21  1213   CR 0.90 0.76 0.82     No results for input(s): A1C in the last 85247 hours.       Recent Labs   Lab Test 03/20/21  0439   WBC 10.1   HGB 16.4   HCT 47.8   MCV 88        Recent Labs   Lab Test 03/20/21  0439 03/19/21  0420 03/18/21  1544   HGB 16.4 17.2 17.2    Recent Labs   Lab Test 03/18/21  1544 03/17/21  1118 03/15/21  0832   TROPONINI 0.03 0.01 0.04     Recent Labs   Lab Test 06/22/21  0843 06/04/21  0905 04/26/21  1213   BNP 18 80* 99*     Recent Labs   Lab Test 04/22/19  1924   TSH 1.96     Recent Labs   Lab Test 03/22/21  0733 03/21/21  0451 03/18/21  1544   INR 1.13* 1.05 1.06        Grover Oliveira MD                                        Thank you for allowing me to participate in the care of your patient.      Sincerely,     Grover Oliveira MD     [facility]  cc:   No referring provider defined for this encounter.

## 2021-09-30 NOTE — PATIENT INSTRUCTIONS
Please call my nurse or write on my chart with any questions.Call with increased fluid retention,leg swelling, dizziness.We discussed a new category of medications that are helpful to the heart including Jardiance 10mg daily and Farxiga 10mg daily.Please look into the cost and let me know your thoughts.My nurse is Paradise and her number is [phone].Plan follow up in 3 months and we will be in touch with the blood work.

## 2021-10-11 ENCOUNTER — HEALTH MAINTENANCE LETTER (OUTPATIENT)
Age: 56
End: 2021-10-11

## 2022-01-28 ENCOUNTER — OFFICE VISIT (OUTPATIENT)
Dept: CARDIOLOGY | Facility: CLINIC | Age: 57
End: 2022-01-28
Attending: INTERNAL MEDICINE
Payer: COMMERCIAL

## 2022-01-28 VITALS
BODY MASS INDEX: 30.21 KG/M2 | SYSTOLIC BLOOD PRESSURE: 118 MMHG | WEIGHT: 204 LBS | HEIGHT: 69 IN | RESPIRATION RATE: 16 BRPM | HEART RATE: 58 BPM | DIASTOLIC BLOOD PRESSURE: 72 MMHG

## 2022-01-28 DIAGNOSIS — I50.9 ACUTE DECOMPENSATED HEART FAILURE (H): ICD-10-CM

## 2022-01-28 DIAGNOSIS — I50.22 CHRONIC SYSTOLIC CONGESTIVE HEART FAILURE (H): ICD-10-CM

## 2022-01-28 DIAGNOSIS — I42.8 NONISCHEMIC CARDIOMYOPATHY (H): ICD-10-CM

## 2022-01-28 DIAGNOSIS — Z86.73 HISTORY OF CVA (CEREBROVASCULAR ACCIDENT): Primary | ICD-10-CM

## 2022-01-28 DIAGNOSIS — I63.411 CEREBRAL INFARCTION DUE TO EMBOLISM OF RIGHT MIDDLE CEREBRAL ARTERY (H): ICD-10-CM

## 2022-01-28 LAB
ALT SERPL W P-5'-P-CCNC: 23 U/L (ref 0–45)
ANION GAP SERPL CALCULATED.3IONS-SCNC: 8 MMOL/L (ref 5–18)
AST SERPL W P-5'-P-CCNC: 21 U/L (ref 0–40)
BNP SERPL-MCNC: 49 PG/ML (ref 0–46)
BUN SERPL-MCNC: 23 MG/DL (ref 8–22)
CALCIUM SERPL-MCNC: 9 MG/DL (ref 8.5–10.5)
CHLORIDE BLD-SCNC: 106 MMOL/L (ref 98–107)
CHOLEST SERPL-MCNC: 175 MG/DL
CO2 SERPL-SCNC: 26 MMOL/L (ref 22–31)
CREAT SERPL-MCNC: 0.8 MG/DL (ref 0.7–1.3)
FASTING STATUS PATIENT QL REPORTED: YES
GFR SERPL CREATININE-BSD FRML MDRD: >90 ML/MIN/1.73M2
GLUCOSE BLD-MCNC: 104 MG/DL (ref 70–125)
HDLC SERPL-MCNC: 45 MG/DL
INR BLD: 1.4 (ref 2–3)
LDLC SERPL CALC-MCNC: 111 MG/DL
MAGNESIUM SERPL-MCNC: 2 MG/DL (ref 1.8–2.6)
POTASSIUM BLD-SCNC: 4.3 MMOL/L (ref 3.5–5)
SODIUM SERPL-SCNC: 140 MMOL/L (ref 136–145)
TRIGL SERPL-MCNC: 94 MG/DL

## 2022-01-28 PROCEDURE — 84460 ALANINE AMINO (ALT) (SGPT): CPT | Performed by: INTERNAL MEDICINE

## 2022-01-28 PROCEDURE — 93000 ELECTROCARDIOGRAM COMPLETE: CPT | Performed by: INTERNAL MEDICINE

## 2022-01-28 PROCEDURE — 80061 LIPID PANEL: CPT | Performed by: INTERNAL MEDICINE

## 2022-01-28 PROCEDURE — 85610 PROTHROMBIN TIME: CPT | Performed by: INTERNAL MEDICINE

## 2022-01-28 PROCEDURE — 83735 ASSAY OF MAGNESIUM: CPT | Performed by: INTERNAL MEDICINE

## 2022-01-28 PROCEDURE — 84450 TRANSFERASE (AST) (SGOT): CPT | Performed by: INTERNAL MEDICINE

## 2022-01-28 PROCEDURE — 36415 COLL VENOUS BLD VENIPUNCTURE: CPT | Performed by: INTERNAL MEDICINE

## 2022-01-28 PROCEDURE — 36416 COLLJ CAPILLARY BLOOD SPEC: CPT | Performed by: INTERNAL MEDICINE

## 2022-01-28 PROCEDURE — 80048 BASIC METABOLIC PNL TOTAL CA: CPT | Performed by: INTERNAL MEDICINE

## 2022-01-28 PROCEDURE — 83880 ASSAY OF NATRIURETIC PEPTIDE: CPT | Performed by: INTERNAL MEDICINE

## 2022-01-28 PROCEDURE — 99214 OFFICE O/P EST MOD 30 MIN: CPT | Mod: 25 | Performed by: INTERNAL MEDICINE

## 2022-01-28 RX ORDER — ATORVASTATIN CALCIUM 40 MG/1
40 TABLET, FILM COATED ORAL DAILY
Qty: 90 TABLET | Refills: 4 | Status: SHIPPED | OUTPATIENT
Start: 2022-01-28 | End: 2022-09-01

## 2022-01-28 RX ORDER — ENALAPRIL MALEATE 10 MG/1
10 TABLET ORAL DAILY
Qty: 90 TABLET | Refills: 4 | Status: SHIPPED | OUTPATIENT
Start: 2022-01-28 | End: 2022-09-01

## 2022-01-28 RX ORDER — CARVEDILOL 12.5 MG/1
18.75 TABLET ORAL 2 TIMES DAILY WITH MEALS
Qty: 180 TABLET | Refills: 3 | Status: SHIPPED | OUTPATIENT
Start: 2022-01-28 | End: 2022-09-01

## 2022-01-28 RX ORDER — SPIRONOLACTONE 25 MG/1
25 TABLET ORAL DAILY
Qty: 90 TABLET | Refills: 5 | Status: SHIPPED | OUTPATIENT
Start: 2022-01-28 | End: 2022-09-01

## 2022-01-28 RX ORDER — FUROSEMIDE 20 MG
20 TABLET ORAL 2 TIMES DAILY
Qty: 180 TABLET | Refills: 3 | Status: SHIPPED | OUTPATIENT
Start: 2022-01-28 | End: 2022-09-01

## 2022-01-28 ASSESSMENT — MIFFLIN-ST. JEOR: SCORE: 1750.72

## 2022-01-28 NOTE — LETTER
1/28/2022    Segundo Kaufman MD  [facility] [address]  [id number]  [facility] [address]    RE: Matthew W Behr       Dear Colleague,     I had the pleasure of seeing Matthew W Behr in the [facility].    HEART CARE ENCOUNTER CONSULTATON NOTE      [facility]  [phone]      Assessment/Recommendations   Assessment/Plan:  1. Heart failure with reduced ejection fraction, nonischemic cardiomyopathy question prior history of myocarditis. Clinically doing well without evidence for heart failure on today's examination. A BNP a number of months ago was 79. We are going to continue with his current combination of medications pending follow-up of laboratory studies and an echocardiogram. I will be in touch once we have the echocardiographic data to review next steps regarding medications and additional follow-up.    2. 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 He has been on warfarin. Further discussion pending the upcoming echocardiogram. He tells me today that he has not been back to his primary care physician where his INRs have been monitored for 4 months. I strongly encouraged him to reconsider having his INR checked more regularly. I did offer to switch his INR to our clinic but he indicates that he will do this to his primary care physician. I did check an INR today. I did indicate that I was deferring the warfarin renewal to his primary care physician and he states he has enough pending additional follow-up. This decision was based on where his INR is being monitored.    3. Dyslipidemia. He was placed on atorvastatin when admitted with the acute CVA by neurology. I do not see any follow-up lipids in the interim and will plan to check this today. He is on 40 mg of atorvastatin.       History of Present Illness/Subjective    HPI: Matthew W Behr is a 56 year old male with a history of nonischemic cardiomyopathy who presents for follow-up. He reports that has been feeling  very well. He specifically denies chest pain, shortness of breath, dizziness or lightheadedness. He has been taking his medications regularly but I did learn today that he has not had his INR checked in 4 months. He follows at [facility] for his INR. I did tell him today that it is prudent to have his INR followed more regularly. He reports no awareness of fluid retention or weight gain. No palpitations. He is reporting some hip pain and wonders about potential for surgical intervention to his hip but has not yet visited with orthopedic surgery. I encouraged him to make an appointment to visit with orthopedic surgery. He had a CT coronary angiogram in 2018 with a calcium score of 0 and no obstructive coronary artery disease. He stopped his medications and was admitted March 2021 for worsening heart failure symptoms and CVA felt to be embolic. In the interim he has been very diligent about checking his weights and follow-up. Ejection fraction during that admission had declined to 17% which was down from 40% in 2019. Most recently cardiac MRI pleated July 22, 2021 revealed an EF up to 43%.    Recent Echocardiogram Results:  Echocardiogram Limited  Order: [id number]   Status: Final result     Visible to patient: Yes (not seen)     Next appt: 01/28/2022 at 07:50 AM in Cardiology (Grover Oliveira MD)     Dx: CHF (congestive heart failure) (H)     1 Result Note    Details    Reading Physician Reading Date Result Priority   Kuldip Cheung MD  [phone] 5/14/2021 Routine   Provider, Historical 5/14/2021      Narrative & Impression    Left ventricle ejection fraction is moderately decreased. The estimated left ventricular ejection fraction is 40%.    Normal right ventricular size and systolic function.    When compared to the previous study dated 3/18/2021, LVEF is higher         Recent Coronary Angiogram Results:  Exam Date Exam Time Exam Date Exam Time Accession # Performing Department Results    1/3/18  8:22 AM  1/3/18 11:28 AM [id number] [facility]        [id number]         PACS Images     Show images for CTA Angiogram coronary artery    Study Result    Narrative & Impression     The total Agatston calcium score is 0. A calcium score of zero places the individual in the lowest quartile when compared to an age and gender matched control group and implies a low risk of cardiac events in the next 10 years. (less than 1% per   year).    No detectable atherosclerotic plaque or significant stenosis observed.    Significant misregistration artifact decreases ability to interpret the examination the findings are most consistent with artifact rather than obstructive artery disease.    Mild dilatation of the proximal aorta at the level of the sinuses of Valsalva    Please see radiology interpretation for additional findings.                MRN:                  [id number]                                  Name:            BEHRLAUREN NIEVES MG                                  :                  1965                                  Scan Date:   2021 09:37:12                                   Electronically signed by Maxime Hernandez  14:19:47     VITALS   ==========================================================================================================     HEIGHT: 69.02 in    (175.30 cm)  WEIGHT: 191.01 lbs    (86.64 kgs)  BSA: 2.03 m^2     SUMMARY   ==========================================================================================================     1.  Normal left ventricular size and wall thickness. Systolic function is mildly reduced with global  hypokinesis. The quantified left ventricular ejection fraction is 43%.   2.  Normal right ventricular size. Systolic function is mildly reduced. The quantified right ventricular  ejection fraction is 39%.   3.  There is a small area of midwall fibrosis in the basal inferolateral wall, in a nonischemic pattern.  The location  and appearance is typical of the sequelae of a prior episode of myocarditis.  4.  No significant valvular abnormalities.    ECG today sinus rhythm, right bundle branch block, occasional PVC.        Physical Examination  Review of Systems   Vitals: There were no vitals taken for this visit.  BMI= There is no height or weight on file to calculate BMI.  Wt Readings from Last 3 Encounters:   09/30/21 93 kg (205 lb)   07/28/21 90.4 kg (199 lb 3.2 oz)   06/22/21 86.6 kg (191 lb)       General Appearance:   no distress, normal body habitus   ENT/Mouth:  Facemask in place.      EYES:  no scleral icterus, normal conjunctivae   Neck: no carotid bruits or thyromegaly   Chest/Lungs:   lungs are clear to auscultation, no rales or wheezing, , equal chest wall expansion    Cardiovascular:   Regular. Normal first and second heart sounds with no murmurs, rubs, or gallops; the carotid, radial and posterior tibial pulses are intact, Jugular venous pressure within normal limits on today's examination, no edema bilaterally    Abdomen:  no organomegaly, masses, bruits, or tenderness; bowel sounds are present   Extremities: no cyanosis or clubbing   Skin: no xanthelasma, warm.    Neurologic: normal  bilateral, no tremors     Psychiatric: alert and oriented x3, calm        Please refer above for cardiac ROS details.        Medical History  Surgical History Family History Social History   Past Medical History:   Diagnosis Date     Acute respiratory failure with hypoxia (H)      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     Asthma      Cellulitis     Left hand     Chest pain with normal coronary angiography 1/3/2018     Nonischemic cardiomyopathy (H) 1/10/2018     Janel Parkinson White pattern seen on electrocardiogram      Past Surgical History:   Procedure Laterality Date     OTHER SURGICAL HISTORY  About 10 + years ago    OTHER SURGICAL HISTORYCardiac surgery [facility]     Family History   Problem Relation Age of Onset      Chronic Obstructive Pulmonary Disease Mother         Social History     Socioeconomic History     Marital status:      Spouse name: Not on file     Number of children: Not on file     Years of education: Not on file     Highest education level: Not on file   Occupational History     Not on file   Tobacco Use     Smoking status: Never Smoker     Smokeless tobacco: Former User     Types: Chew   Substance and Sexual Activity     Alcohol use: Yes     Comment: Alcoholic Drinks/day: 5 beers per week     Drug use: No     Sexual activity: Not on file   Other Topics Concern     Not on file   Social History Narrative     Not on file     Social Determinants of Health     Financial Resource Strain: Not on file   Food Insecurity: Not on file   Transportation Needs: Not on file   Physical Activity: Not on file   Stress: Not on file   Social Connections: Not on file   Intimate Partner Violence: Not on file   Housing Stability: Not on file           Medications  Allergies   Current Outpatient Medications   Medication Sig Dispense Refill     ASMANEX  mcg/actuation HFAA [ASMANEX  MCG/ACTUATION HFAA] daily.       atorvastatin (LIPITOR) 40 MG tablet Take 1 tablet (40 mg) by mouth daily 90 tablet 0     carvediloL (COREG) 12.5 MG tablet [CARVEDILOL (COREG) 12.5 MG TABLET] Take 1.5 tablets (18.75 mg total) by mouth 2 (two) times a day with meals. 180 tablet 3     enalapril (VASOTEC) 10 MG tablet [ENALAPRIL (VASOTEC) 10 MG TABLET] Take 1 tablet (10 mg total) by mouth daily. 90 tablet 4     furosemide (LASIX) 20 MG tablet Take 1 tablet (20 mg) by mouth 2 times daily 180 tablet 3     hydrOXYzine HCL (ATARAX) 25 MG tablet [HYDROXYZINE HCL (ATARAX) 25 MG TABLET] Take 1 tablet (25 mg total) by mouth every 6 (six) hours as needed for anxiety. 12 tablet 0     sertraline (ZOLOFT) 50 MG tablet [SERTRALINE (ZOLOFT) 50 MG TABLET] Take 50 mg by mouth daily.       spironolactone (ALDACTONE) 25 MG tablet Take 1 tablet (25 mg)  by mouth daily 90 tablet 5     VENTOLIN HFA 90 mcg/actuation inhaler [VENTOLIN HFA 90 MCG/ACTUATION INHALER] Take 2 puffs by mouth every 4 (four) hours as needed.         0     warfarin ANTICOAGULANT (COUMADIN/JANTOVEN) 3 MG tablet [WARFARIN ANTICOAGULANT (COUMADIN/JANTOVEN) 3 MG TABLET] Take 1 tablets (3 mg) by mouth daily. Adjust dose based on INR results as directed. 30 tablet 0     No Known Allergies       Lab Results    Chemistry/lipid CBC Cardiac Enzymes/BNP/TSH/INR   Recent Labs   Lab Test 03/18/21  1544   CHOL 245*   HDL 49   *   TRIG 243*     Recent Labs   Lab Test 03/18/21  1544 01/01/18  0544   * 77     Recent Labs   Lab Test 09/30/21  0824      POTASSIUM 4.4   CHLORIDE 108*   CO2 24   GLC 97   BUN 18   CR 0.71   GFRESTIMATED >90   ELLIS 9.0     Recent Labs   Lab Test 09/30/21  0824 06/22/21  0843 06/04/21  0905   CR 0.71 0.90 0.76     No results for input(s): A1C in the last 81135 hours.       Recent Labs   Lab Test 03/20/21  0439   WBC 10.1   HGB 16.4   HCT 47.8   MCV 88        Recent Labs   Lab Test 03/20/21  0439 03/19/21  0420 03/18/21  1544   HGB 16.4 17.2 17.2    Recent Labs   Lab Test 03/18/21  1544 03/17/21  1118 03/15/21  0832   TROPONINI 0.03 0.01 0.04     Recent Labs   Lab Test 09/30/21  0825 06/22/21  0843 06/04/21  0905   BNP 79* 18 80*     Recent Labs   Lab Test 04/22/19  1924   TSH 1.96     Recent Labs   Lab Test 03/22/21  0733 03/21/21  0451 03/18/21  1544   INR 1.13* 1.05 1.06        Grover Oliveira MD    Thank you for allowing me to participate in the care of your patient.      Sincerely,     Grover Oliveira MD     [facility]  cc: Grover Oliveira MD

## 2022-01-28 NOTE — PATIENT INSTRUCTIONS
Is nice to visit with you today. I will be in touch after we have the follow-up ultrasound report. Please call with any heart related symptoms. Specifically increasing shortness of breath or increasing weight or leg swelling. Please follow-up with your primary care physician as a relates to your INR more regularly. I will give you the results of the INR that we checked today. Please avoid medicines like Motrin or ibuprofen as much as you can because there is an increased risk of bleeding in combination with warfarin. Tylenol would be a better choice. Please call my nurse Paradise at [phone] with any questions or write on my chart.

## 2022-01-28 NOTE — PROGRESS NOTES
HEART CARE ENCOUNTER CONSULTATON NOTE      [facility]  [phone]      Assessment/Recommendations   Assessment/Plan:  1. Heart failure with reduced ejection fraction, nonischemic cardiomyopathy question prior history of myocarditis. Clinically doing well without evidence for heart failure on today's examination. A BNP a number of months ago was 79. We are going to continue with his current combination of medications pending follow-up of laboratory studies and an echocardiogram. I will be in touch once we have the echocardiographic data to review next steps regarding medications and additional follow-up.    2. 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 He has been on warfarin. Further discussion pending the upcoming echocardiogram. He tells me today that he has not been back to his primary care physician where his INRs have been monitored for 4 months. I strongly encouraged him to reconsider having his INR checked more regularly. I did offer to switch his INR to our clinic but he indicates that he will do this to his primary care physician. I did check an INR today. I did indicate that I was deferring the warfarin renewal to his primary care physician and he states he has enough pending additional follow-up. This decision was based on where his INR is being monitored.    3. Dyslipidemia. He was placed on atorvastatin when admitted with the acute CVA by neurology. I do not see any follow-up lipids in the interim and will plan to check this today. He is on 40 mg of atorvastatin.       History of Present Illness/Subjective    HPI: Matthew W Behr is a 56 year old male with a history of nonischemic cardiomyopathy who presents for follow-up. He reports that has been feeling very well. He specifically denies chest pain, shortness of breath, dizziness or lightheadedness. He has been taking his medications regularly but I did learn today that he has not had his INR checked in 4 months. He follows at [facility] for his  INR. I did tell him today that it is prudent to have his INR followed more regularly. He reports no awareness of fluid retention or weight gain. No palpitations. He is reporting some hip pain and wonders about potential for surgical intervention to his hip but has not yet visited with orthopedic surgery. I encouraged him to make an appointment to visit with orthopedic surgery. He had a CT coronary angiogram in 2018 with a calcium score of 0 and no obstructive coronary artery disease. He stopped his medications and was admitted March 2021 for worsening heart failure symptoms and CVA felt to be embolic. In the interim he has been very diligent about checking his weights and follow-up. Ejection fraction during that admission had declined to 17% which was down from 40% in 2019. Most recently cardiac MRI pleated July 22, 2021 revealed an EF up to 43%.    Recent Echocardiogram Results:  Echocardiogram Limited  Order: [id number]   Status: Final result     Visible to patient: Yes (not seen)     Next appt: 01/28/2022 at 07:50 AM in Cardiology (Grover Oliveira MD)     Dx: CHF (congestive heart failure) (H)     1 Result Note    Details    Reading Physician Reading Date Result Priority   Kuldip Cheung MD  [phone] 5/14/2021 Routine   Provider, Historical 5/14/2021      Narrative & Impression    Left ventricle ejection fraction is moderately decreased. The estimated left ventricular ejection fraction is 40%.    Normal right ventricular size and systolic function.    When compared to the previous study dated 3/18/2021, LVEF is higher         Recent Coronary Angiogram Results:  Exam Date Exam Time Exam Date Exam Time Accession # Performing Department Results    1/3/18  8:22 AM 1/3/18 11:28 AM [id number] [facility] [place]        [id number]         PACS Images     Show images for CTA Angiogram coronary artery    Study Result    Narrative & Impression     The total Agatston calcium score is 0. A  calcium score of zero places the individual in the lowest quartile when compared to an age and gender matched control group and implies a low risk of cardiac events in the next 10 years. (less than 1% per   year).    No detectable atherosclerotic plaque or significant stenosis observed.    Significant misregistration artifact decreases ability to interpret the examination the findings are most consistent with artifact rather than obstructive artery disease.    Mild dilatation of the proximal aorta at the level of the sinuses of Valsalva    Please see radiology interpretation for additional findings.                MRN:                  [id number]                                  Name:            BEHR, MATTHEW W                                  :                  1965                                  Scan Date:   2021 09:37:12                                   Electronically signed by Maxime Hernandez  14:19:47     VITALS   ==========================================================================================================     HEIGHT: 69.02 in    (175.30 cm)  WEIGHT: 191.01 lbs    (86.64 kgs)  BSA: 2.03 m^2     SUMMARY   ==========================================================================================================     1.  Normal left ventricular size and wall thickness. Systolic function is mildly reduced with global  hypokinesis. The quantified left ventricular ejection fraction is 43%.   2.  Normal right ventricular size. Systolic function is mildly reduced. The quantified right ventricular  ejection fraction is 39%.   3.  There is a small area of midwall fibrosis in the basal inferolateral wall, in a nonischemic pattern.  The location and appearance is typical of the sequelae of a prior episode of myocarditis.  4.  No significant valvular abnormalities.    ECG today sinus rhythm, right bundle branch block, occasional PVC.        Physical Examination  Review of Systems    Vitals: There were no vitals taken for this visit.  BMI= There is no height or weight on file to calculate BMI.  Wt Readings from Last 3 Encounters:   09/30/21 93 kg (205 lb)   07/28/21 90.4 kg (199 lb 3.2 oz)   06/22/21 86.6 kg (191 lb)       General Appearance:   no distress, normal body habitus   ENT/Mouth:  Facemask in place.      EYES:  no scleral icterus, normal conjunctivae   Neck: no carotid bruits or thyromegaly   Chest/Lungs:   lungs are clear to auscultation, no rales or wheezing, , equal chest wall expansion    Cardiovascular:   Regular. Normal first and second heart sounds with no murmurs, rubs, or gallops; the carotid, radial and posterior tibial pulses are intact, Jugular venous pressure within normal limits on today's examination, no edema bilaterally    Abdomen:  no organomegaly, masses, bruits, or tenderness; bowel sounds are present   Extremities: no cyanosis or clubbing   Skin: no xanthelasma, warm.    Neurologic: normal  bilateral, no tremors     Psychiatric: alert and oriented x3, calm        Please refer above for cardiac ROS details.        Medical History  Surgical History Family History Social History   Past Medical History:   Diagnosis Date     Acute respiratory failure with hypoxia (H)      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     Asthma      Cellulitis     Left hand     Chest pain with normal coronary angiography 1/3/2018     Nonischemic cardiomyopathy (H) 1/10/2018     Janel Parkinson White pattern seen on electrocardiogram      Past Surgical History:   Procedure Laterality Date     OTHER SURGICAL HISTORY  About 10 + years ago    OTHER SURGICAL [facility]ORYCardiac surgery [facility]     Family History   Problem Relation Age of Onset     Chronic Obstructive Pulmonary Disease Mother         Social History     Socioeconomic History     Marital status:      Spouse name: Not on file     Number of children: Not on file     Years of education: Not on file     Highest  education level: Not on file   Occupational History     Not on file   Tobacco Use     Smoking status: Never Smoker     Smokeless tobacco: Former User     Types: Chew   Substance and Sexual Activity     Alcohol use: Yes     Comment: Alcoholic Drinks/day: 5 beers per week     Drug use: No     Sexual activity: Not on file   Other Topics Concern     Not on file   Social History Narrative     Not on file     Social Determinants of Health     Financial Resource Strain: Not on file   Food Insecurity: Not on file   Transportation Needs: Not on file   Physical Activity: Not on file   Stress: Not on file   Social Connections: Not on file   Intimate Partner Violence: Not on file   Housing Stability: Not on file           Medications  Allergies   Current Outpatient Medications   Medication Sig Dispense Refill     ASMANEX  mcg/actuation HFAA [ASMANEX  MCG/ACTUATION HFAA] daily.       atorvastatin (LIPITOR) 40 MG tablet Take 1 tablet (40 mg) by mouth daily 90 tablet 0     carvediloL (COREG) 12.5 MG tablet [CARVEDILOL (COREG) 12.5 MG TABLET] Take 1.5 tablets (18.75 mg total) by mouth 2 (two) times a day with meals. 180 tablet 3     enalapril (VASOTEC) 10 MG tablet [ENALAPRIL (VASOTEC) 10 MG TABLET] Take 1 tablet (10 mg total) by mouth daily. 90 tablet 4     furosemide (LASIX) 20 MG tablet Take 1 tablet (20 mg) by mouth 2 times daily 180 tablet 3     hydrOXYzine HCL (ATARAX) 25 MG tablet [HYDROXYZINE HCL (ATARAX) 25 MG TABLET] Take 1 tablet (25 mg total) by mouth every 6 (six) hours as needed for anxiety. 12 tablet 0     sertraline (ZOLOFT) 50 MG tablet [SERTRALINE (ZOLOFT) 50 MG TABLET] Take 50 mg by mouth daily.       spironolactone (ALDACTONE) 25 MG tablet Take 1 tablet (25 mg) by mouth daily 90 tablet 5     VENTOLIN HFA 90 mcg/actuation inhaler [VENTOLIN HFA 90 MCG/ACTUATION INHALER] Take 2 puffs by mouth every 4 (four) hours as needed.         0     warfarin ANTICOAGULANT (COUMADIN/JANTOVEN) 3 MG tablet [WARFARIN  ANTICOAGULANT (COUMADIN/JANTOVEN) 3 MG TABLET] Take 1 tablets (3 mg) by mouth daily. Adjust dose based on INR results as directed. 30 tablet 0     No Known Allergies       Lab Results    Chemistry/lipid CBC Cardiac Enzymes/BNP/TSH/INR   Recent Labs   Lab Test 03/18/21  1544   CHOL 245*   HDL 49   *   TRIG 243*     Recent Labs   Lab Test 03/18/21  1544 01/01/18  0544   * 77     Recent Labs   Lab Test 09/30/21  0824      POTASSIUM 4.4   CHLORIDE 108*   CO2 24   GLC 97   BUN 18   CR 0.71   GFRESTIMATED >90   ELLIS 9.0     Recent Labs   Lab Test 09/30/21  0824 06/22/21  0843 06/04/21  0905   CR 0.71 0.90 0.76     No results for input(s): A1C in the last 38827 hours.       Recent Labs   Lab Test 03/20/21  0439   WBC 10.1   HGB 16.4   HCT 47.8   MCV 88        Recent Labs   Lab Test 03/20/21  0439 03/19/21  0420 03/18/21  1544   HGB 16.4 17.2 17.2    Recent Labs   Lab Test 03/18/21  1544 03/17/21  1118 03/15/21  0832   TROPONINI 0.03 0.01 0.04     Recent Labs   Lab Test 09/30/21  0825 06/22/21  0843 06/04/21  0905   BNP 79* 18 80*     Recent Labs   Lab Test 04/22/19  1924   TSH 1.96     Recent Labs   Lab Test 03/22/21  0733 03/21/21  0451 03/18/21  1544   INR 1.13* 1.05 1.06        Grover Oliveira MD

## 2022-01-31 LAB
ATRIAL RATE - MUSE: 60 BPM
DIASTOLIC BLOOD PRESSURE - MUSE: NORMAL MMHG
INTERPRETATION ECG - MUSE: NORMAL
P AXIS - MUSE: 68 DEGREES
PR INTERVAL - MUSE: 146 MS
QRS DURATION - MUSE: 144 MS
QT - MUSE: 466 MS
QTC - MUSE: 466 MS
R AXIS - MUSE: 72 DEGREES
SYSTOLIC BLOOD PRESSURE - MUSE: NORMAL MMHG
T AXIS - MUSE: 58 DEGREES
VENTRICULAR RATE- MUSE: 60 BPM

## 2022-02-14 ENCOUNTER — HOSPITAL ENCOUNTER (OUTPATIENT)
Dept: CARDIOLOGY | Facility: HOSPITAL | Age: 57
Discharge: HOME | End: 2022-02-14
Attending: INTERNAL MEDICINE | Admitting: INTERNAL MEDICINE
Payer: COMMERCIAL

## 2022-02-14 DIAGNOSIS — I50.22 CHRONIC SYSTOLIC CONGESTIVE HEART FAILURE (H): ICD-10-CM

## 2022-02-14 LAB — LVEF ECHO: NORMAL

## 2022-02-14 PROCEDURE — 255N000002 HC RX 255 OP 636: Performed by: INTERNAL MEDICINE

## 2022-02-14 PROCEDURE — 93306 TTE W/DOPPLER COMPLETE: CPT | Mod: 26 | Performed by: INTERNAL MEDICINE

## 2022-02-14 RX ADMIN — PERFLUTREN 4 ML: 6.52 INJECTION, SUSPENSION INTRAVENOUS at 08:40

## 2022-03-16 ENCOUNTER — LAB REQUISITION (OUTPATIENT)
Dept: LAB | Facility: CLINIC | Age: 57
End: 2022-03-16

## 2022-03-16 DIAGNOSIS — Z12.5 ENCOUNTER FOR SCREENING FOR MALIGNANT NEOPLASM OF PROSTATE: ICD-10-CM

## 2022-03-16 DIAGNOSIS — Z13.220 ENCOUNTER FOR SCREENING FOR LIPOID DISORDERS: ICD-10-CM

## 2022-03-16 DIAGNOSIS — Z13.1 ENCOUNTER FOR SCREENING FOR DIABETES MELLITUS: ICD-10-CM

## 2022-03-16 LAB
ALBUMIN SERPL-MCNC: 3.9 G/DL (ref 3.5–5)
ALP SERPL-CCNC: 74 U/L (ref 45–120)
ALT SERPL W P-5'-P-CCNC: 31 U/L (ref 0–45)
ANION GAP SERPL CALCULATED.3IONS-SCNC: 12 MMOL/L (ref 5–18)
AST SERPL W P-5'-P-CCNC: 23 U/L (ref 0–40)
BILIRUB SERPL-MCNC: 1.6 MG/DL (ref 0–1)
BUN SERPL-MCNC: 23 MG/DL (ref 8–22)
CALCIUM SERPL-MCNC: 9.1 MG/DL (ref 8.5–10.5)
CHLORIDE BLD-SCNC: 104 MMOL/L (ref 98–107)
CHOLEST SERPL-MCNC: 174 MG/DL
CO2 SERPL-SCNC: 24 MMOL/L (ref 22–31)
CREAT SERPL-MCNC: 0.91 MG/DL (ref 0.7–1.3)
GFR SERPL CREATININE-BSD FRML MDRD: >90 ML/MIN/1.73M2
GLUCOSE BLD-MCNC: 102 MG/DL (ref 70–125)
HDLC SERPL-MCNC: 52 MG/DL
LDLC SERPL CALC-MCNC: 100 MG/DL
POTASSIUM BLD-SCNC: 4.5 MMOL/L (ref 3.5–5)
PROT SERPL-MCNC: 7.2 G/DL (ref 6–8)
PSA SERPL-MCNC: 3.57 UG/L (ref 0–3.5)
SODIUM SERPL-SCNC: 140 MMOL/L (ref 136–145)
TRIGL SERPL-MCNC: 112 MG/DL

## 2022-03-16 PROCEDURE — 80061 LIPID PANEL: CPT | Performed by: NURSE PRACTITIONER

## 2022-03-16 PROCEDURE — G0103 PSA SCREENING: HCPCS | Performed by: NURSE PRACTITIONER

## 2022-03-16 PROCEDURE — 80053 COMPREHEN METABOLIC PANEL: CPT | Performed by: NURSE PRACTITIONER

## 2022-03-16 PROCEDURE — 82040 ASSAY OF SERUM ALBUMIN: CPT | Performed by: NURSE PRACTITIONER

## 2022-03-24 ENCOUNTER — TELEPHONE (OUTPATIENT)
Dept: CARDIOLOGY | Facility: CLINIC | Age: 57
End: 2022-03-24
Payer: COMMERCIAL

## 2022-03-24 NOTE — TELEPHONE ENCOUNTER
----- Message from Grover Oliveira MD sent at 2/27/2022 12:07 PM CST -----  Would be helpful to get neurology input as it relates to how long to continue blood thinner mdg    === Recommendations discussed with pt.  Per pt - he hasn't not had any follow-up with neurology since hospital discharge.    Per Dr Coby Madrid 3/22/21 note:  Plans:  Started Coumadin target INR 2-3.  Lipitor 20 mg a day, follow-up with primary cardiac goal to resolve LDL below 70 for secondary stroke prevention.  Clinically doing well.  Disposition per primary team.    Continue therapy.    Follow-up with Dr. Manning in about 1 month.    Pt was given name, phone #, and address of Neurology clinic.  Pt verbalized understanding and had no other questions.  -kleber

## 2022-05-04 ENCOUNTER — OFFICE VISIT (OUTPATIENT)
Dept: NEUROLOGY | Facility: CLINIC | Age: 57
End: 2022-05-04
Payer: COMMERCIAL

## 2022-05-04 VITALS
HEIGHT: 69 IN | SYSTOLIC BLOOD PRESSURE: 142 MMHG | RESPIRATION RATE: 16 BRPM | BODY MASS INDEX: 30.36 KG/M2 | HEART RATE: 60 BPM | DIASTOLIC BLOOD PRESSURE: 92 MMHG | WEIGHT: 205 LBS

## 2022-05-04 DIAGNOSIS — I63.411 CEREBRAL INFARCTION DUE TO EMBOLISM OF RIGHT MIDDLE CEREBRAL ARTERY (H): Primary | ICD-10-CM

## 2022-05-04 PROCEDURE — 99215 OFFICE O/P EST HI 40 MIN: CPT | Performed by: PSYCHIATRY & NEUROLOGY

## 2022-05-04 NOTE — PROGRESS NOTES
NEUROLOGY FOLLOW UP VISIT  NOTE       [facility] [place]  [address] [place] [address]  Tel: [phone]  Fax: [phone]  [email or web address]     Matthew W Behr,  1965, MRN [id number]  PCP: Segundo Kaufman  Date: 2022      ASSESSMENT & PLAN     Visit Diagnosis  1. Cerebral infarction due to embolism of right middle cerebral artery (H)     H/O Right MCA infarction  56-year-old male with history of asthma, heart failure was admitted to the hospital 2021 with right MCA infarction his ejection fraction was 17% and was started on Coumadin.  Most recent echocardiogram on 2022 shows borderline ejection fraction at 50 to 55%.  I have recommended:    1.  Continue Coumadin with goal of INR between 2-3 at least for 1 more year  2.  Repeat echocardiogram after 1 year  3.  Check lipid profile and adjust dose of Lipitor with goal of LDL less than 70  4.  Follow-up will be in 1 year    Thank you again for this referral, please feel free to contact me if you have any questions.    Loco Barrow MD  [facility]  (Former[facility], [facility]     HISTORY OF PRESENT ILLNESS     Patient is a 56-year-old male with history of asthma, heart failure was admitted to the hospital in 2021 for worsening shortness of breath and was diagnosed with cardiomyopathy.  Cardiology made adjustment in his medication and on 3/18/2021 he developed left facial droop along with left-sided weakness and a stroke code was called.  His CT of the head and CTA was negative and he received tPA.  Afterwards MRI brain was done that showed a right MCA embolic infarct and subsequently hemorrhagic conversion was noted.  Patient was noted to have fairly low ejection fraction 17% and was eventually started on Coumadin   He has been on Lipitor since the CVA but has had lipid profile checked.  He also had a repeat echocardiogram done on  2/14/2022 that showed an ejection fraction of 50 to 55%.  Cardiology was wondering if he needs to continue on Coumadin.     PROBLEM LIST   Patient Active Problem List   Diagnosis Code     Mild intermittent asthma without complication J45.20     Wide-complex tachycardia (H) I47.2     Heart failure with reduced ejection fraction (H) I50.20     Nonischemic cardiomyopathy (H) I42.8     Amphetamine urine screen positive F15.90     Chest pain with normal coronary angiography R07.9     Acute on chronic systolic congestive heart failure (H) I50.23     Acute decompensated heart failure (H) I50.9     Cerebral infarction due to embolism of right middle cerebral artery (H) I63.411         PAST MEDICAL & SURGICAL HISTORY     Past Medical History:   Patient  has a past medical history of Acute respiratory failure with hypoxia (H), Amphetamine misuse (12/31/2017), Asthma, Cellulitis, Chest pain with normal coronary angiography (1/3/2018), Nonischemic cardiomyopathy (H) (1/10/2018), and Janel Parkinson White pattern seen on electrocardiogram.    Surgical History:  He  has a past surgical history that includes other surgical history (About 10 + years ago).     SOCIAL HISTORY     Reviewed, and he  reports that he has never smoked. He has quit using smokeless tobacco.  His smokeless tobacco use included chew. He reports current alcohol use. He reports that he does not use drugs.     FAMILY HISTORY     Reviewed, and family history includes Chronic Obstructive Pulmonary Disease in his mother.     ALLERGIES     No Known Allergies      REVIEW OF SYSTEMS     A 12 point review of system was performed and was negative except as outlined in the history of present illness.     HOME MEDICATIONS     Current Outpatient Rx   Medication Sig Dispense Refill     ASMANEX  mcg/actuation HFAA [ASMANEX  MCG/ACTUATION HFAA] daily.       atorvastatin (LIPITOR) 40 MG tablet Take 1 tablet (40 mg) by mouth daily 90 tablet 4     carvedilol  "(COREG) 12.5 MG tablet Take 1.5 tablets (18.75 mg) by mouth 2 times daily (with meals) 180 tablet 3     enalapril (VASOTEC) 10 MG tablet Take 1 tablet (10 mg) by mouth daily 90 tablet 4     furosemide (LASIX) 20 MG tablet Take 1 tablet (20 mg) by mouth 2 times daily 180 tablet 3     sertraline (ZOLOFT) 50 MG tablet [SERTRALINE (ZOLOFT) 50 MG TABLET] Take 50 mg by mouth daily.       spironolactone (ALDACTONE) 25 MG tablet Take 1 tablet (25 mg) by mouth daily 90 tablet 5     VENTOLIN HFA 90 mcg/actuation inhaler [VENTOLIN HFA 90 MCG/ACTUATION INHALER] Take 2 puffs by mouth every 4 (four) hours as needed.         0     warfarin ANTICOAGULANT (COUMADIN/JANTOVEN) 3 MG tablet [WARFARIN ANTICOAGULANT (COUMADIN/JANTOVEN) 3 MG TABLET] Take 1 tablets (3 mg) by mouth daily. Adjust dose based on INR results as directed. 30 tablet 0         PHYSICAL EXAM     Vital signs  BP (!) 142/92   Pulse 60   Resp 16   Ht 1.753 m (5' 9\")   Wt 93 kg (205 lb)   BMI 30.27 kg/m      Weight:   205 lbs 0 oz    Patient is alert and oriented x4 in no acute distress. Vital signs were reviewed and are documented in electronic medical record. Neck was supple, no carotid bruits, thyromegaly, JVD, or lymphadenopathy was noted.   NEUROLOGY EXAM:    Patient s speech was normal with no aphasia or dysarthria. Mentation, and affect were also normal.     Funduscopic exam was normal, with normal cup to disc ratio. Cranial nerves II -XII were intact.     Patient had normal mass, tone and motor strength was 5/5 in all extremities without pronator drift.  Except left upper extremity proximally 5 -/5    Sensation was intact to light touch, pinprick, and vibratory sensation.     Reflexes were 1+ symmetrical with downgoing toes.     No dysmetria noted on FNF or HKS.     Gait testing was normal. Able to walk on toes/heels. Tandem walk normal.     PERTINENT DIAGNOSTIC STUDIES     Following studies were reviewed:     CTA HEAD & NECK 3/18/2021  HEAD CT:  1.  No " acute intracranial process.     HEAD CTA:   1.  No significant stenosis, aneurysm, or high flow vascular malformation involving proximal [place] of [place] branches.     NECK CTA:  1.  No hemodynamically significant stenosis of the carotid circulation by NASCET criteria.  2.  No evidence for dissection.  3.  Variant anatomy of the aortic arch and variant origin of the right vertebral artery from the proximal right common carotid artery.    MRI BRAIN 3/20/2021  1.  Area of acute/early subacute ischemic change with associated blood products/hemorrhagic conversion involving the cortex and subcortical white matter of the anterior inferior right parietal lobe.    ECHOCARDIOGRAM 2/14/2022  The left ventricle is normal in size.  Left ventricular function is decreased. The ejection fraction is 50-55%  (borderline).  No regional wall motion abnormalities noted.  Normal right ventricle size and systolic function.  Compared to 3/18/21 LVEF is significantly improved. No hemodynamically  significant valvular abnormalities on 2D or color flow imaging.     PERTINENT LABS  Following labs were reviewed:  Lab Requisition on 03/16/2022   Component Date Value     Cholesterol 03/16/2022 174      Triglycerides 03/16/2022 112      Direct Measure HDL 03/16/2022 52      LDL Cholesterol Calculat* 03/16/2022 100      Prostate Specific Antige* 03/16/2022 3.57 (A)     Sodium 03/16/2022 140      Potassium 03/16/2022 4.5      Chloride 03/16/2022 104      Carbon Dioxide (CO2) 03/16/2022 24      Anion Gap 03/16/2022 12      Urea Nitrogen 03/16/2022 23 (A)     Creatinine 03/16/2022 0.91      Calcium 03/16/2022 9.1      Glucose 03/16/2022 102      Alkaline Phosphatase 03/16/2022 74      AST 03/16/2022 23      ALT 03/16/2022 31      Protein Total 03/16/2022 7.2      Albumin 03/16/2022 3.9      Bilirubin Total 03/16/2022 1.6 (A)     GFR Estimate 03/16/2022 >90    Hospital Outpatient Visit on 02/14/2022   Component Date Value     LVEF  02/14/2022 50-55%  (borderline)          Total time spent for face to face visit, reviewing labs/imaging studies, counseling and coordination of care was: 45 Minutes spent on the date of the encounter doing chart review, review of outside records, review of test results, interpretation of tests, patient visit and documentation       This note was dictated using voice recognition software.  Any grammatical or context distortions are unintentional and inherent to the software.    Orders Placed This Encounter   Procedures     Lipid Profile      New Prescriptions    No medications on file     Modified Medications    No medications on file

## 2022-05-04 NOTE — NURSING NOTE
"Matthew W Behr is a 56 year old male who presents for:  Chief Complaint   Patient presents with     Follow Up     Stroke         Initial Vitals:  BP (!) 142/92   Pulse 60   Resp 16   Ht 1.753 m (5' 9\")   Wt 93 kg (205 lb)   BMI 30.27 kg/m   Estimated body mass index is 30.27 kg/m  as calculated from the following:    Height as of this encounter: 1.753 m (5' 9\").    Weight as of this encounter: 93 kg (205 lb).. Body surface area is 2.13 meters squared. BP completed using cuff size: wrist cuff  Data Unavailable    Nursing Comments:     Ashtyn Santa, [facility]    "

## 2022-05-04 NOTE — LETTER
2022         RE: Matthew W Behr  [address]  [facility] [address]        Dear Colleague,    Thank you for referring your patient, Matthew W Behr, to the [facility] [place]. Please see a copy of my visit note below.    NEUROLOGY FOLLOW UP VISIT  NOTE       [facility] [place]  [address] [place] [address]  Tel: [phone]  Fax: [phone]  [email or web address]     Matthew W Behr,  1965, MRN [id number]  PCP: Segundo Kaufman  Date: 2022      ASSESSMENT & PLAN     Visit Diagnosis  1. Cerebral infarction due to embolism of right middle cerebral artery (H)     H/O Right MCA infarction  56-year-old male with history of asthma, heart failure was admitted to the hospital 2021 with right MCA infarction his ejection fraction was 17% and was started on Coumadin.  Most recent echocardiogram on 2022 shows borderline ejection fraction at 50 to 55%.  I have recommended:    1.  Continue Coumadin with goal of INR between 2-3 at least for 1 more year  2.  Repeat echocardiogram after 1 year  3.  Check lipid profile and adjust dose of Lipitor with goal of LDL less than 70  4.  Follow-up will be in 1 year    Thank you again for this referral, please feel free to contact me if you have any questions.    Loco Barrow MD  [facility]  (Formerly, [facility]     HISTORY OF PRESENT ILLNESS     Patient is a 56-year-old male with history of asthma, heart failure was admitted to the hospital in 2021 for worsening shortness of breath and was diagnosed with cardiomyopathy.  Cardiology made adjustment in his medication and on 3/18/2021 he developed left facial droop along with left-sided weakness and a stroke code was called.  His CT of the head and CTA was negative and he received tPA.  Afterwards MRI brain was done that showed a right MCA embolic infarct and subsequently  hemorrhagic conversion was noted.  Patient was noted to have fairly low ejection fraction 17% and was eventually started on Coumadin   He has been on Lipitor since the CVA but has had lipid profile checked.  He also had a repeat echocardiogram done on 2/14/2022 that showed an ejection fraction of 50 to 55%.  Cardiology was wondering if he needs to continue on Coumadin.     PROBLEM LIST   Patient Active Problem List   Diagnosis Code     Mild intermittent asthma without complication J45.20     Wide-complex tachycardia (H) I47.2     Heart failure with reduced ejection fraction (H) I50.20     Nonischemic cardiomyopathy (H) I42.8     Amphetamine urine screen positive F15.90     Chest pain with normal coronary angiography R07.9     Acute on chronic systolic congestive heart failure (H) I50.23     Acute decompensated heart failure (H) I50.9     Cerebral infarction due to embolism of right middle cerebral artery (H) I63.411         PAST MEDICAL & SURGICAL HISTORY     Past Medical History:   Patient  has a past medical history of Acute respiratory failure with hypoxia (H), Amphetamine misuse (12/31/2017), Asthma, Cellulitis, Chest pain with normal coronary angiography (1/3/2018), Nonischemic cardiomyopathy (H) (1/10/2018), and Janel Parkinson White pattern seen on electrocardiogram.    Surgical History:  He  has a past surgical history that includes other surgical history (About 10 + years ago).     SOCIAL HISTORY     Reviewed, and he  reports that he has never smoked. He has quit using smokeless tobacco.  His smokeless tobacco use included chew. He reports current alcohol use. He reports that he does not use drugs.     FAMILY HISTORY     Reviewed, and family history includes Chronic Obstructive Pulmonary Disease in his mother.     ALLERGIES     No Known Allergies      REVIEW OF SYSTEMS     A 12 point review of system was performed and was negative except as outlined in the history of present illness.     HOME MEDICATIONS  "    Current Outpatient Rx   Medication Sig Dispense Refill     ASMANEX  mcg/actuation HFAA [ASMANEX  MCG/ACTUATION HFAA] daily.       atorvastatin (LIPITOR) 40 MG tablet Take 1 tablet (40 mg) by mouth daily 90 tablet 4     carvedilol (COREG) 12.5 MG tablet Take 1.5 tablets (18.75 mg) by mouth 2 times daily (with meals) 180 tablet 3     enalapril (VASOTEC) 10 MG tablet Take 1 tablet (10 mg) by mouth daily 90 tablet 4     furosemide (LASIX) 20 MG tablet Take 1 tablet (20 mg) by mouth 2 times daily 180 tablet 3     sertraline (ZOLOFT) 50 MG tablet [SERTRALINE (ZOLOFT) 50 MG TABLET] Take 50 mg by mouth daily.       spironolactone (ALDACTONE) 25 MG tablet Take 1 tablet (25 mg) by mouth daily 90 tablet 5     VENTOLIN HFA 90 mcg/actuation inhaler [VENTOLIN HFA 90 MCG/ACTUATION INHALER] Take 2 puffs by mouth every 4 (four) hours as needed.         0     warfarin ANTICOAGULANT (COUMADIN/JANTOVEN) 3 MG tablet [WARFARIN ANTICOAGULANT (COUMADIN/JANTOVEN) 3 MG TABLET] Take 1 tablets (3 mg) by mouth daily. Adjust dose based on INR results as directed. 30 tablet 0         PHYSICAL EXAM     Vital signs  BP (!) 142/92   Pulse 60   Resp 16   Ht 1.753 m (5' 9\")   Wt 93 kg (205 lb)   BMI 30.27 kg/m      Weight:   205 lbs 0 oz    Patient is alert and oriented x4 in no acute distress. Vital signs were reviewed and are documented in electronic medical record. Neck was supple, no carotid bruits, thyromegaly, JVD, or lymphadenopathy was noted.   NEUROLOGY EXAM:    Patient s speech was normal with no aphasia or dysarthria. Mentation, and affect were also normal.     Funduscopic exam was normal, with normal cup to disc ratio. Cranial nerves II -XII were intact.     Patient had normal mass, tone and motor strength was 5/5 in all extremities without pronator drift.  Except left upper extremity proximally 5 -/5    Sensation was intact to light touch, pinprick, and vibratory sensation.     Reflexes were 1+ symmetrical with " downgoing toes.     No dysmetria noted on FNF or HKS.     Gait testing was normal. Able to walk on toes/heels. Tandem walk normal.     PERTINENT DIAGNOSTIC STUDIES     Following studies were reviewed:     CTA HEAD & NECK 3/18/2021  HEAD CT:  1.  No acute intracranial process.     HEAD CTA:   1.  No significant stenosis, aneurysm, or high flow vascular malformation involving proximal [place] branches.     NECK CTA:  1.  No hemodynamically significant stenosis of the carotid circulation by NASCET criteria.  2.  No evidence for dissection.  3.  Variant anatomy of the aortic arch and variant origin of the right vertebral artery from the proximal right common carotid artery.    MRI BRAIN 3/20/2021  1.  Area of acute/early subacute ischemic change with associated blood products/hemorrhagic conversion involving the cortex and subcortical white matter of the anterior inferior right parietal lobe.    ECHOCARDIOGRAM 2/14/2022  The left ventricle is normal in size.  Left ventricular function is decreased. The ejection fraction is 50-55%  (borderline).  No regional wall motion abnormalities noted.  Normal right ventricle size and systolic function.  Compared to 3/18/21 LVEF is significantly improved. No hemodynamically  significant valvular abnormalities on 2D or color flow imaging.     PERTINENT LABS  Following labs were reviewed:  Lab Requisition on 03/16/2022   Component Date Value     Cholesterol 03/16/2022 174      Triglycerides 03/16/2022 112      Direct Measure HDL 03/16/2022 52      LDL Cholesterol Calculat* 03/16/2022 100      Prostate Specific Antige* 03/16/2022 3.57 (A)     Sodium 03/16/2022 140      Potassium 03/16/2022 4.5      Chloride 03/16/2022 104      Carbon Dioxide (CO2) 03/16/2022 24      Anion Gap 03/16/2022 12      Urea Nitrogen 03/16/2022 23 (A)     Creatinine 03/16/2022 0.91      Calcium 03/16/2022 9.1      Glucose 03/16/2022 102      Alkaline Phosphatase 03/16/2022 74      AST 03/16/2022 23       ALT 03/16/2022 31      Protein Total 03/16/2022 7.2      Albumin 03/16/2022 3.9      Bilirubin Total 03/16/2022 1.6 (A)     GFR Estimate 03/16/2022 >90    Hospital Outpatient Visit on 02/14/2022   Component Date Value     LVEF  02/14/2022 50-55% (borderline)          Total time spent for face to face visit, reviewing labs/imaging studies, counseling and coordination of care was: 45 Minutes spent on the date of the encounter doing chart review, review of outside records, review of test results, interpretation of tests, patient visit and documentation       This note was dictated using voice recognition software.  Any grammatical or context distortions are unintentional and inherent to the software.    Orders Placed This Encounter   Procedures     Lipid Profile      New Prescriptions    No medications on file     Modified Medications    No medications on file                     Again, thank you for allowing me to participate in the care of your patient.        Sincerely,        Loco Barrow MD

## 2022-06-04 NOTE — TELEPHONE ENCOUNTER
Per 5/4/22 Neurology visit:    H/O Right MCA infarction  56-year-old male with history of asthma, heart failure was admitted to the hospital March 2021 with right MCA infarction his ejection fraction was 17% and was started on Coumadin.  Most recent echocardiogram on 2/14/2022 shows borderline ejection fraction at 50 to 55%.  I have recommended:     1.  Continue Coumadin with goal of INR between 2-3 at least for 1 more year  2.  Repeat echocardiogram after 1 year  3.  Check lipid profile and adjust dose of Lipitor with goal of LDL less than 70  4.  Follow-up will be in 1 year

## 2022-07-17 ENCOUNTER — HEALTH MAINTENANCE LETTER (OUTPATIENT)
Age: 57
End: 2022-07-17

## 2022-08-17 ENCOUNTER — TRANSFERRED RECORDS (OUTPATIENT)
Dept: HEALTH INFORMATION MANAGEMENT | Facility: CLINIC | Age: 57
End: 2022-08-17

## 2022-08-22 DIAGNOSIS — I50.20 HEART FAILURE WITH REDUCED EJECTION FRACTION (H): Primary | ICD-10-CM

## 2022-08-22 NOTE — PROGRESS NOTES
Pt called to make appt with Vane.  Informed pt he is also due to see Dr Oliveira.  Follow-up DCB order entered.  Call transferred to scheduling.  -chastity

## 2022-09-01 ENCOUNTER — OFFICE VISIT (OUTPATIENT)
Dept: CARDIOLOGY | Facility: CLINIC | Age: 57
End: 2022-09-01
Attending: NURSE PRACTITIONER
Payer: COMMERCIAL

## 2022-09-01 VITALS
DIASTOLIC BLOOD PRESSURE: 66 MMHG | OXYGEN SATURATION: 96 % | RESPIRATION RATE: 16 BRPM | WEIGHT: 208 LBS | HEART RATE: 64 BPM | SYSTOLIC BLOOD PRESSURE: 100 MMHG | BODY MASS INDEX: 30.72 KG/M2

## 2022-09-01 DIAGNOSIS — I50.20 HEART FAILURE WITH REDUCED EJECTION FRACTION (H): Primary | ICD-10-CM

## 2022-09-01 DIAGNOSIS — I50.9 ACUTE DECOMPENSATED HEART FAILURE (H): ICD-10-CM

## 2022-09-01 DIAGNOSIS — I10 BENIGN ESSENTIAL HYPERTENSION: ICD-10-CM

## 2022-09-01 DIAGNOSIS — I42.8 NONISCHEMIC CARDIOMYOPATHY (H): ICD-10-CM

## 2022-09-01 DIAGNOSIS — I63.411 CEREBRAL INFARCTION DUE TO EMBOLISM OF RIGHT MIDDLE CEREBRAL ARTERY (H): ICD-10-CM

## 2022-09-01 LAB
ANION GAP SERPL CALCULATED.3IONS-SCNC: 7 MMOL/L (ref 5–18)
BUN SERPL-MCNC: 22 MG/DL (ref 8–22)
CALCIUM SERPL-MCNC: 8.9 MG/DL (ref 8.5–10.5)
CHLORIDE BLD-SCNC: 107 MMOL/L (ref 98–107)
CO2 SERPL-SCNC: 26 MMOL/L (ref 22–31)
CREAT SERPL-MCNC: 0.77 MG/DL (ref 0.7–1.3)
GFR SERPL CREATININE-BSD FRML MDRD: >90 ML/MIN/1.73M2
GLUCOSE BLD-MCNC: 106 MG/DL (ref 70–125)
POTASSIUM BLD-SCNC: 4.5 MMOL/L (ref 3.5–5)
SODIUM SERPL-SCNC: 140 MMOL/L (ref 136–145)

## 2022-09-01 PROCEDURE — 99214 OFFICE O/P EST MOD 30 MIN: CPT | Performed by: NURSE PRACTITIONER

## 2022-09-01 PROCEDURE — 36415 COLL VENOUS BLD VENIPUNCTURE: CPT | Performed by: NURSE PRACTITIONER

## 2022-09-01 PROCEDURE — 80048 BASIC METABOLIC PNL TOTAL CA: CPT | Performed by: NURSE PRACTITIONER

## 2022-09-01 RX ORDER — SPIRONOLACTONE 25 MG/1
25 TABLET ORAL DAILY
Qty: 90 TABLET | Refills: 3 | Status: SHIPPED | OUTPATIENT
Start: 2022-09-01 | End: 2024-03-11

## 2022-09-01 RX ORDER — FUROSEMIDE 20 MG
20 TABLET ORAL 2 TIMES DAILY
Qty: 180 TABLET | Refills: 3 | Status: SHIPPED | OUTPATIENT
Start: 2022-09-01 | End: 2022-11-11

## 2022-09-01 RX ORDER — CARVEDILOL 12.5 MG/1
18.75 TABLET ORAL 2 TIMES DAILY WITH MEALS
Qty: 180 TABLET | Refills: 3 | Status: SHIPPED | OUTPATIENT
Start: 2022-09-01 | End: 2023-07-20

## 2022-09-01 RX ORDER — ATORVASTATIN CALCIUM 40 MG/1
40 TABLET, FILM COATED ORAL DAILY
Qty: 90 TABLET | Refills: 3 | Status: SHIPPED | OUTPATIENT
Start: 2022-09-01 | End: 2023-07-20

## 2022-09-01 RX ORDER — ENALAPRIL MALEATE 10 MG/1
10 TABLET ORAL DAILY
Qty: 90 TABLET | Refills: 3 | Status: SHIPPED | OUTPATIENT
Start: 2022-09-01 | End: 2023-07-20

## 2022-09-01 NOTE — PATIENT INSTRUCTIONS
Matthew W Behr,    It was a pleasure to see you today at [facility].     My recommendations after this visit include:  - Please follow up with Dr Oliveira November 8   - Please follow up with Vane Martin in February  - I have checked labs and will contact you with results   - Continue current medications    Vane Martin, CNP

## 2022-09-01 NOTE — LETTER
9/1/2022    Segundo Kaufman MD  [facility] [address]  [place]  [facility] [address]    RE: Matthew W Behr       Dear Colleague,     I had the pleasure of seeing Matthew W Behr in the [facility].        Assessment/Recommendations   Assessment:    1.  Heart failure with reduced ejection fraction, normalization of LVEF 50 to 55%, NYHA class II: Compensated.  He has mild fatigue and dyspnea exertion.  He has gained a lot of weight due to being less active due to needing a hip replacement.  He denies orthopnea, PND or edema.  2.  Hypertension: Controlled.  Blood pressure 100/66    Plan:  1.  BMP pending  2.  Continue current medications  3.  Stressed importance of low-sodium diet and monitoring weights    Matthew W Behr will follow up with Dr. Oliveira November 8 and in the heart failure clinic in February.     History of Present Illness/Subjective    Mr. Matthew W Behr is a 57 year old male seen at [facility] heart failure clinic today for continued follow-up.  He follows up for heart failure with reduced ejection fraction with normalization of LVEF.  His last echocardiogram was done February 2022 which showed an improved ejection fraction of 50 to 55%.  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    Today, he states he is feeling well.  He has mild fatigue and dyspnea on exertion.  He is planning to have hip replacement this coming winter early spring.  He denies lightheadedness, shortness of breath, orthopnea, PND, palpitations, chest pain, abdominal fullness/bloating and lower extremity edema.      He is monitoring home weights which have increased due to being less active.  He tries to follow a low-salt diet.     Physical Examination Review of Systems   /66 (BP Location: Left arm, Patient Position: Sitting, Cuff Size: Adult Regular)   Pulse 64   Resp 16   Wt 94.3 kg (208 lb)   SpO2 96%   BMI 30.72 kg/m    Body mass index is  30.72 kg/m .  Wt Readings from Last 3 Encounters:   09/01/22 94.3 kg (208 lb)   05/04/22 93 kg (205 lb)   01/28/22 92.5 kg (204 lb)       General Appearance:   no acute distress   ENT/Mouth: Wearing mask   EYES:  no scleral icterus, normal conjunctivae   Neck: no thyromegaly   Chest/Lungs:   lungs are clear to auscultation, no rales or wheezing, equal chest wall expansion    Cardiovascular:   Regular. Normal first and second heart sounds with no murmurs, rubs, or gallops; Jugular venous pressure normal, no edema bilaterally    Abdomen:  no organomegaly, masses, bruits, or tenderness; bowel sounds are present   Extremities: no cyanosis or clubbing   Skin: no xanthelasma, warm.    Neurologic: normal  bilateral, no tremors     Psychiatric: alert and oriented x3    Enc Vitals  BP: 100/66  Pulse: 64  Resp: 16  SpO2: 96 %  Weight: 94.3 kg (208 lb)                                         Medical History  Surgical History Family History Social History   Past Medical History:   Diagnosis Date     Acute respiratory failure with hypoxia (H)      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     Asthma      Cellulitis     Left hand     Chest pain with normal coronary angiography 01/03/2018     Congestive heart failure (H)      Hypertension      Nonischemic cardiomyopathy (H) 01/10/2018     Janel Parkinson White pattern seen on electrocardiogram     Past Surgical History:   Procedure Laterality Date     OTHER SURGICAL HISTORY  About 10 + years ago    OTHER SURGICAL HISTORYCardiac surgery [facility]el [facility]    Family History   Problem Relation Age of Onset     Chronic Obstructive Pulmonary Disease Mother     Social History     Socioeconomic History     Marital status:      Spouse name: Not on file     Number of children: Not on file     Years of education: Not on file     Highest education level: Not on file   Occupational History     Not on file   Tobacco Use     Smoking status: Never Smoker     Smokeless tobacco:  Former User     Types: Chew   Substance and Sexual Activity     Alcohol use: Yes     Comment: Alcoholic Drinks/day: 5 beers per week     Drug use: No     Sexual activity: Not on file   Other Topics Concern     Not on file   Social History Narrative     Not on file     Social Determinants of Health     Financial Resource Strain: Not on file   Food Insecurity: Not on file   Transportation Needs: Not on file   Physical Activity: Not on file   Stress: Not on file   Social Connections: Not on file   Intimate Partner Violence: Not on file   Housing Stability: Not on file          Medications  Allergies   Current Outpatient Medications   Medication Sig Dispense Refill     ASMANEX  mcg/actuation HFAA Inhale 2 puffs into the lungs 2 times daily       atorvastatin (LIPITOR) 40 MG tablet Take 1 tablet (40 mg) by mouth daily 90 tablet 3     carvedilol (COREG) 12.5 MG tablet Take 1.5 tablets (18.75 mg) by mouth 2 times daily (with meals) 180 tablet 3     enalapril (VASOTEC) 10 MG tablet Take 1 tablet (10 mg) by mouth daily 90 tablet 3     furosemide (LASIX) 20 MG tablet Take 1 tablet (20 mg) by mouth 2 times daily 180 tablet 3     sertraline (ZOLOFT) 50 MG tablet [SERTRALINE (ZOLOFT) 50 MG TABLET] Take 50 mg by mouth daily.       spironolactone (ALDACTONE) 25 MG tablet Take 1 tablet (25 mg) by mouth daily 90 tablet 3     VENTOLIN HFA 90 mcg/actuation inhaler [VENTOLIN HFA 90 MCG/ACTUATION INHALER] Take 2 puffs by mouth every 4 (four) hours as needed.         0     warfarin ANTICOAGULANT (COUMADIN/JANTOVEN) 3 MG tablet [WARFARIN ANTICOAGULANT (COUMADIN/JANTOVEN) 3 MG TABLET] Take 1 tablets (3 mg) by mouth daily. Adjust dose based on INR results as directed. 30 tablet 0    No Known Allergies      Lab Results    Chemistry/lipid CBC Cardiac Enzymes/BNP/TSH/INR   Lab Results   Component Value Date    CHOL 174 03/16/2022    HDL 52 03/16/2022    TRIG 112 03/16/2022    BUN 23 (H) 03/16/2022     03/16/2022    CO2 24  03/16/2022    Lab Results   Component Value Date    WBC 10.1 03/20/2021    HGB 16.4 03/20/2021    HCT 47.8 03/20/2021    MCV 88 03/20/2021     03/20/2021    Lab Results   Component Value Date    TROPONINI 0.03 03/18/2021    BNP 49 (H) 01/28/2022    TSH 1.96 04/22/2019    INR 1.4 (A) 01/28/2022             This note has been dictated using voice recognition software. Any grammatical, typographical, or context distortions are unintentional and inherent to the software    30 minutes spent on the date of encounter doing chart review, review of outside records, review of test results, interpretation with above tests, patient visit and documentation.                  Thank you for allowing me to participate in the care of your patient.      Sincerely,     RANI La CNP     [facility]  cc:   RANI La CNP  [address], [address]  [place] [address]

## 2022-09-01 NOTE — PROGRESS NOTES
Assessment/Recommendations   Assessment:    1.  Heart failure with reduced ejection fraction, normalization of LVEF 50 to 55%, NYHA class II: Compensated.  He has mild fatigue and dyspnea exertion.  He has gained a lot of weight due to being less active due to needing a hip replacement.  He denies orthopnea, PND or edema.  2.  Hypertension: Controlled.  Blood pressure 100/66    Plan:  1.  BMP pending  2.  Continue current medications  3.  Stressed importance of low-sodium diet and monitoring weights    Matthew W Behr will follow up with Dr. Oliveira November 8 and in the heart failure clinic in February.     History of Present Illness/Subjective    Mr. Matthew W Behr is a 57 year old male seen at [facility] heart failure clinic today for continued follow-up.  He follows up for heart failure with reduced ejection fraction with normalization of LVEF.  His last echocardiogram was done February 2022 which showed an improved ejection fraction of 50 to 55%.  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    Today, he states he is feeling well.  He has mild fatigue and dyspnea on exertion.  He is planning to have hip replacement this coming winter early spring.  He denies lightheadedness, shortness of breath, orthopnea, PND, palpitations, chest pain, abdominal fullness/bloating and lower extremity edema.      He is monitoring home weights which have increased due to being less active.  He tries to follow a low-salt diet.     Physical Examination Review of Systems   /66 (BP Location: Left arm, Patient Position: Sitting, Cuff Size: Adult Regular)   Pulse 64   Resp 16   Wt 94.3 kg (208 lb)   SpO2 96%   BMI 30.72 kg/m    Body mass index is 30.72 kg/m .  Wt Readings from Last 3 Encounters:   09/01/22 94.3 kg (208 lb)   05/04/22 93 kg (205 lb)   01/28/22 92.5 kg (204 lb)       General Appearance:   no acute distress   ENT/Mouth: Wearing mask   EYES:  no scleral icterus,  normal conjunctivae   Neck: no thyromegaly   Chest/Lungs:   lungs are clear to auscultation, no rales or wheezing, equal chest wall expansion    Cardiovascular:   Regular. Normal first and second heart sounds with no murmurs, rubs, or gallops; Jugular venous pressure normal, no edema bilaterally    Abdomen:  no organomegaly, masses, bruits, or tenderness; bowel sounds are present   Extremities: no cyanosis or clubbing   Skin: no xanthelasma, warm.    Neurologic: normal  bilateral, no tremors     Psychiatric: alert and oriented x3    Enc Vitals  BP: 100/66  Pulse: 64  Resp: 16  SpO2: 96 %  Weight: 94.3 kg (208 lb)                                         Medical History  Surgical History Family History Social History   Past Medical History:   Diagnosis Date     Acute respiratory failure with hypoxia (H)      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     Asthma      Cellulitis     Left hand     Chest pain with normal coronary angiography 01/03/2018     Congestive heart failure (H)      Hypertension      Nonischemic cardiomyopathy (H) 01/10/2018     Janel Parkinson White pattern seen on electrocardiogram     Past Surgical History:   Procedure Laterality Date     OTHER SURGICAL HISTORY  About 10 + years ago    OTHER SURGICAL HISTORYCardiac surgery for Janel Parkinson at [facility]    Family History   Problem Relation Age of Onset     Chronic Obstructive Pulmonary Disease Mother     Social History     Socioeconomic History     Marital status:      Spouse name: Not on file     Number of children: Not on file     Years of education: Not on file     Highest education level: Not on file   Occupational History     Not on file   Tobacco Use     Smoking status: Never Smoker     Smokeless tobacco: Former User     Types: Chew   Substance and Sexual Activity     Alcohol use: Yes     Comment: Alcoholic Drinks/day: 5 beers per week     Drug use: No     Sexual activity: Not on file   Other Topics Concern     Not on file   Social  History Narrative     Not on file     Social Determinants of Health     Financial Resource Strain: Not on file   Food Insecurity: Not on file   Transportation Needs: Not on file   Physical Activity: Not on file   Stress: Not on file   Social Connections: Not on file   Intimate Partner Violence: Not on file   Housing Stability: Not on file          Medications  Allergies   Current Outpatient Medications   Medication Sig Dispense Refill     ASMANEX  mcg/actuation HFAA Inhale 2 puffs into the lungs 2 times daily       atorvastatin (LIPITOR) 40 MG tablet Take 1 tablet (40 mg) by mouth daily 90 tablet 3     carvedilol (COREG) 12.5 MG tablet Take 1.5 tablets (18.75 mg) by mouth 2 times daily (with meals) 180 tablet 3     enalapril (VASOTEC) 10 MG tablet Take 1 tablet (10 mg) by mouth daily 90 tablet 3     furosemide (LASIX) 20 MG tablet Take 1 tablet (20 mg) by mouth 2 times daily 180 tablet 3     sertraline (ZOLOFT) 50 MG tablet [SERTRALINE (ZOLOFT) 50 MG TABLET] Take 50 mg by mouth daily.       spironolactone (ALDACTONE) 25 MG tablet Take 1 tablet (25 mg) by mouth daily 90 tablet 3     VENTOLIN HFA 90 mcg/actuation inhaler [VENTOLIN HFA 90 MCG/ACTUATION INHALER] Take 2 puffs by mouth every 4 (four) hours as needed.         0     warfarin ANTICOAGULANT (COUMADIN/JANTOVEN) 3 MG tablet [WARFARIN ANTICOAGULANT (COUMADIN/JANTOVEN) 3 MG TABLET] Take 1 tablets (3 mg) by mouth daily. Adjust dose based on INR results as directed. 30 tablet 0    No Known Allergies      Lab Results    Chemistry/lipid CBC Cardiac Enzymes/BNP/TSH/INR   Lab Results   Component Value Date    CHOL 174 03/16/2022    HDL 52 03/16/2022    TRIG 112 03/16/2022    BUN 23 (H) 03/16/2022     03/16/2022    CO2 24 03/16/2022    Lab Results   Component Value Date    WBC 10.1 03/20/2021    HGB 16.4 03/20/2021    HCT 47.8 03/20/2021    MCV 88 03/20/2021     03/20/2021    Lab Results   Component Value Date    TROPONINI 0.03 03/18/2021    BNP 49  (H) 01/28/2022    TSH 1.96 04/22/2019    INR 1.4 (A) 01/28/2022             This note has been dictated using voice recognition software. Any grammatical, typographical, or context distortions are unintentional and inherent to the software    30 minutes spent on the date of encounter doing chart review, review of outside records, review of test results, interpretation with above tests, patient visit and documentation.

## 2022-09-25 ENCOUNTER — HEALTH MAINTENANCE LETTER (OUTPATIENT)
Age: 57
End: 2022-09-25

## 2022-11-08 ENCOUNTER — OFFICE VISIT (OUTPATIENT)
Dept: CARDIOLOGY | Facility: CLINIC | Age: 57
End: 2022-11-08
Attending: INTERNAL MEDICINE
Payer: COMMERCIAL

## 2022-11-08 VITALS
SYSTOLIC BLOOD PRESSURE: 106 MMHG | HEART RATE: 67 BPM | WEIGHT: 216 LBS | DIASTOLIC BLOOD PRESSURE: 76 MMHG | HEIGHT: 69 IN | BODY MASS INDEX: 31.99 KG/M2 | RESPIRATION RATE: 16 BRPM

## 2022-11-08 DIAGNOSIS — I42.8 NONISCHEMIC CARDIOMYOPATHY (H): ICD-10-CM

## 2022-11-08 DIAGNOSIS — I50.22 CHRONIC SYSTOLIC CONGESTIVE HEART FAILURE (H): ICD-10-CM

## 2022-11-08 LAB
CHOLEST SERPL-MCNC: 190 MG/DL
HDLC SERPL-MCNC: 55 MG/DL
INR POINT OF CARE: 1.9 (ref 0.9–1.1)
LDLC SERPL CALC-MCNC: 107 MG/DL
MAGNESIUM SERPL-MCNC: 1.9 MG/DL (ref 1.7–2.3)
NONHDLC SERPL-MCNC: 135 MG/DL
NT-PROBNP SERPL-MCNC: 156 PG/ML (ref 0–900)
POTASSIUM SERPL-SCNC: 4.3 MMOL/L (ref 3.4–5.3)
TRIGL SERPL-MCNC: 139 MG/DL

## 2022-11-08 PROCEDURE — 36415 COLL VENOUS BLD VENIPUNCTURE: CPT | Performed by: INTERNAL MEDICINE

## 2022-11-08 PROCEDURE — 85610 PROTHROMBIN TIME: CPT | Performed by: INTERNAL MEDICINE

## 2022-11-08 PROCEDURE — 84132 ASSAY OF SERUM POTASSIUM: CPT | Performed by: INTERNAL MEDICINE

## 2022-11-08 PROCEDURE — 83735 ASSAY OF MAGNESIUM: CPT | Performed by: INTERNAL MEDICINE

## 2022-11-08 PROCEDURE — 83880 ASSAY OF NATRIURETIC PEPTIDE: CPT | Performed by: INTERNAL MEDICINE

## 2022-11-08 PROCEDURE — 99214 OFFICE O/P EST MOD 30 MIN: CPT | Performed by: INTERNAL MEDICINE

## 2022-11-08 PROCEDURE — 80061 LIPID PANEL: CPT | Performed by: INTERNAL MEDICINE

## 2022-11-08 NOTE — PATIENT INSTRUCTIONS
We are going to plan blood work and an heart ultrasound and I will be in touch with results.My nurse is Paradise and her number is [phone]

## 2022-11-08 NOTE — LETTER
11/8/2022    Segundo Kaufman MD  [facility] [address]  [facility] [address]    RE: Matthew W Behr       Dear Colleague,     I had the pleasure of seeing Matthew W Behr in the [facility].    HEART CARE ENCOUNTER CONSULTATON NOTE      [facility]  [phone]      Assessment/Recommendations   Assessment/Plan:  1.  Heart failure with reduced ejection fraction, nonischemic cardiomyopathy with question prior history of myocarditis.  Clinically doing well.  No signs or symptoms of congestive heart failure with flat neck veins, clear lung fields.  Echocardiogram from January 2020 demonstrates an improvement in systolic function with an ejection fraction most recently of 50 to 55%.  Plan to check additional laboratory studies today and if stable would consider decreasing the furosemide to 20 mg daily and reassessing.    2.  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  Appreciate input from Dr. Barrow.  Dr. Barrow recommended in May 2022 that if ejection fraction remains stable in 1 year warfarin could be discontinued.  We will plan echocardiography prior to his planned hip surgery in February and then we can make additional comments at that time.  He has been somewhat lax with respect to getting his INR checked.  We did talk about importance of having his INR checked regularly and the increased risk of bleeding.    3.  Dyslipidemia.  He has been on atorvastatin post acute CVA.  Lipids are from March 2022 at which time his LDL was 100.  Dr. Barrow mentioned striving for an LDL of 70 and we will plan to recheck lipids today.  He has been more sedentary related to his hip surgery.    Plan.  Laboratory studies including lipid, repeat potassium and proBNP, basic metabolic profile was stable in September.  2.  Consider decreasing furosemide to 20 mg daily  3.  Echocardiogram within the next few months as part of work-up for clearance for hip surgery  4.  Follow-up in 6  months  5.  We will check INR today but typically he gets his INR checked through his primary care provider which she would like to continue to do.         History of Present Illness/Subjective    HPI: Matthew W Behr is a 57 year old male who returns for follow-up.  He reports that he has been feeling well from a cardiovascular standpoint.  He specifically denies shortness of breath, orthopnea, PND, dizziness or palpitation.  He has been more sedentary related to hip discomfort and plans to have hip replacement surgery in February.  He reports that he has been taking his medications regularly although has been lax with respect to follow-up INR which he historically has done through his primary care provider.  I reviewed why it is important to have his INR checked on a regular basis.  I reviewed notes from Dr. Barrow from neurology May 2022 who suggested that he be on warfarin for 1 more year and if the echocardiogram looks stable at that point warfarin could be discontinued.  He suffered a cerebral infarction due to embolism of the right middle artery in March 2021 after he had discontinued his cardiovascular medications and ejection fraction fell to 17%.  Most recent echocardiogram from February 14 shows ejection fraction to has improved to 50 to 55%.  In addition Dr. Barrow recommended that we strive for an LDL goal less than 70.  He had lipids from March where his LDL was 100 although I do not see that there was any additional adjustment in his medications at that time.  Cardiac MRI results were also reviewed.  In July 2021 there was a small area of minimal fibrosis and a nonischemic pattern with suggestion of prior myocarditis.    Recent Echocardiogram Results:  Echocardiogram Complete  Order: [id number]   Status: Edited Result - FINAL      Visible to patient: Yes (not seen)      Next appt: 11/08/2022 at 08:20 AM in Cardiology (Grover Oliveira MD)      Dx: Chronic systolic congestive heart kahlil...      4 Result  Notes  Details    Reading Physician Reading Date Result Priority   Kuldip Cheung MD  [phone] 2022      Narrative & Impression  [id number]  [facility]  [id number]  [id number]^SELVIN^DAVIS^[place] [address]     Name: BEHR, MATTHEW W  MRN: [id number]  : 1965  Study Date: 2022 08:10 AM  Age: 56 yrs  Gender: Male  Patient Location: [facility]  Reason For Study: Chronic systolic congestive heart failure (H)  Ordering Physician: DAVIS MONTALVO  Referring Physician: DAVIS MONTALVO  Performed By: ACE     BSA: 2.1 m2  Height: 69 in  Weight: 204 lb  HR: 60  ______________________________________________________________________________  Procedure  Complete Echo Adult. Definity (NDC #36039-957) given intravenously. 4mL given.  LOT#[id number].  ______________________________________________________________________________  Interpretation Summary     The left ventricle is normal in size.  Left ventricular function is decreased. The ejection fraction is 50-55%  (borderline).  No regional wall motion abnormalities noted.  Normal right ventricle size and systolic function.  Compared to 3/18/21 LVEF is significantly improved. No hemodynamically  significant valvular abnormalities on 2D or color flow imaging.  ______________________________________________________________________________  Left Ventricle  The left ventricle is normal in size. Left ventricular function is decreased.  The ejection fraction is 50-55% (borderline). There is normal left ventricular  wall thickness. Left ventricular diastolic function is normal. No regional  wall motion abnormalities noted.     Right Ventricle  Normal right ventricle size and systolic function.     Atria  Normal left atrial size. Right atrial size is normal. There is no color  Doppler evidence of an atrial shunt.     Mitral Valve  Mitral valve leaflets appear normal. There is no evidence of mitral stenosis  or clinically significant  mitral regurgitation. There is mild (1+) mitral  regurgitation.     Tricuspid Valve  Tricuspid valve leaflets appear normal. Right ventricle systolic pressure  estimate normal. There is trace to mild tricuspid regurgitation.     Aortic Valve  Aortic valve leaflets appear normal. There is no evidence of aortic stenosis  or clinically significant aortic regurgitation.     Pulmonic Valve  The pulmonic valve is not well seen, but is grossly normal. This degree of  valvular regurgitation is within normal limits.     Vessels  The aorta root is normal. Normal size ascending aorta. Inferior vena cava not  well visualized for estimation of right atrial pressure.     Pericardium  There is no pericardial effusion.        Imaging      PACS Images     Show images for MR Cardiac with Contrast     Study Result    Narrative & Impression                                                    Pending sale to [facility]                                                      CMR Report       MRN:                  [id number]                                  Name:            BEHR, MATTHEW W                                  :                  1965                                  Scan Date:   2021 09:37:12                                   Electronically signed by Maxime Hernandez  14:19:47     VITALS   ==========================================================================================================     HEIGHT: 69.02 in    (175.30 cm)  WEIGHT: 191.01 lbs    (86.64 kgs)  BSA: 2.03 m^2     SUMMARY   ==========================================================================================================     1.  Normal left ventricular size and wall thickness. Systolic function is mildly reduced with global  hypokinesis. The quantified left ventricular ejection fraction is 43%.   2.  Normal right ventricular size. Systolic function is mildly reduced. The quantified right ventricular  ejection fraction is 39%.   3.  There is a small  area of midwall fibrosis in the basal inferolateral wall, in a nonischemic pattern.  The location and appearance is typical of the sequelae of a prior episode of myocarditis.  4.  No significant valvular abnormalities.      ==========================================================================================================  REPORT FINDINGS:     LEFT VENTRICLE: LV wall thickness is normal. LV cavity size is normal. LV systolic function is mildly decreased with global  hypokinesis. There is no LV mass/thrombus. LV systolic function is normal. The native (pre-contrast)  myocardial T1 value measures 1000 ms. Quantitative LVEF 43 %.     VIABILITY: LV scar size is 1 %. There is patchy midwall delayed enhancement in the basal inferolateral wall.     RIGHT VENTRICLE: RV wall thickness is normal. RV cavity size is normal. RV systolic function is mildly reduced. There is no  RV mass/thrombus. Quantitative RVEF 39 %.     LA/RA SEPTUM: The atrial septum appears normal.     LEFT ATRIUM: LA cavity size is normal.     RIGHT ATRIUM: RA cavity size is normal.     PERICARDIUM: Pericardium is normal. There is no pericardial effusion.     PLEURAL EFFUSION: There is no pleural effusion.     AORTIC VALVE: Aortic valve is trileaflet. There is no aortic regurgitation. There is no aortic stenosis.      MITRAL VALVE: Mitral valve leaflets are normal. There is no mitral regurgitation. There is no mitral stenosis.      TRICUSPID VALVE: Tricuspid valve leaflets are normal. There is no tricuspid regurgitation. There is no tricuspid stenosis.      PULMONIC VALVE: Pulmonic valve leaflets are normal. There is no pulmonic regurgitation. There is no pulmonic stenosis.      AORTIC ROOT: The aortic root is normal.     CHEST:  Normal thoracic aortic size and its major branches.         Per 5/4/22 Neurology visit:     H/O Right MCA infarction  56-year-old male with history of asthma, heart failure was admitted to the hospital March 2021 with right  MCA infarction his ejection fraction was 17% and was started on Coumadin.  Most recent echocardiogram on 2022 shows borderline ejection fraction at 50 to 55%.  I have recommended:     1.  Continue Coumadin with goal of INR between 2-3 at least for 1 more year  2.  Repeat echocardiogram after 1 year  3.  Check lipid profile and adjust dose of Lipitor with goal of LDL less than 70  4.  Follow-up will be in 1 year      MRN:                  [id number]                                  Name:            BEHR, MATTHEW W                                  :                  1965                                  Scan Date:   2021 09:37:12                                   Electronically signed by Maxime Hernandez  14:19:47     VITALS   ==========================================================================================================     HEIGHT: 69.02 in    (175.30 cm)  WEIGHT: 191.01 lbs    (86.64 kgs)  BSA: 2.03 m^2     SUMMARY   ==========================================================================================================     1.  Normal left ventricular size and wall thickness. Systolic function is mildly reduced with global  hypokinesis. The quantified left ventricular ejection fraction is 43%.   2.  Normal right ventricular size. Systolic function is mildly reduced. The quantified right ventricular  ejection fraction is 39%.   3.  There is a small area of midwall fibrosis in the basal inferolateral wall, in a nonischemic pattern.  The location and appearance is typical of the sequelae of a prior episode of myocarditis.  4.  No significant valvular abnormalities.    Recent Coronary Angiogram Results:    1/3/18  8:22 AM 1/3/18 11:28 AM [id number] [facility] CT        [id number]       PACS Images     Show images for CTA Angiogram coronary artery     Study Result    Narrative & Impression     The total Agatston calcium score is 0. A calcium score of zero  places the individual in the lowest quartile when compared to an age and gender matched control group and implies a low risk of cardiac events in the next 10 years. (less than 1% per   year).    No detectable atherosclerotic plaque or significant stenosis observed.    Significant misregistration artifact decreases ability to interpret the examination the findings are most consistent with artifact rather than obstructive artery disease.    Mild dilatation of the proximal aorta at the level of the sinuses of Valsalva    Please see radiology interpretation for additional findings.          Physical Examination  Review of Systems   Vitals: 106/76, weight 216 pounds, heart rate of 67 and regular  Wt Readings from Last 3 Encounters:   09/01/22 94.3 kg (208 lb)   05/04/22 93 kg (205 lb)   01/28/22 92.5 kg (204 lb)       General Appearance:   no distress, normal body habitus   ENT/Mouth:  Wearing a facemask   EYES:  no scleral icterus, normal conjunctivae   Neck: no carotid bruits    Chest/Lungs:   lungs are clear to auscultation, no rales or wheezing, equal chest wall expansion    Cardiovascular:   Regular. Normal first and second heart sounds with no murmurs, rubs, or gallops; the carotid, radial and posterior tibial pulses are intact, Jugular venous pressure within normal limits, no edema bilaterally    Abdomen:  no  bruits, or tenderness; bowel sounds are present   Extremities: no cyanosis or clubbing   Skin: no xanthelasma, warm.    Neurologic: , no tremors     Psychiatric: alert and oriented x3, calm        Please refer above for cardiac ROS details.        Medical History  Surgical History Family History Social History   Past Medical History:   Diagnosis Date     Acute respiratory failure with hypoxia (H)      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     Asthma      Cellulitis     Left hand     Chest pain with normal coronary angiography 01/03/2018     Congestive heart failure (H)      Hypertension      Nonischemic  cardiomyopathy (H) 01/10/2018     Janel Parkinson White pattern seen on electrocardiogram      Past Surgical History:   Procedure Laterality Date     OTHER SURGICAL HISTORY  About 10 + years ago    OTHER SURGICAL H[facility]ORY[facility]     Family History   Problem Relation Age of Onset     Chronic Obstructive Pulmonary Disease Mother         Social History     Socioeconomic History     Marital status:      Spouse name: Not on file     Number of children: Not on file     Years of education: Not on file     Highest education level: Not on file   Occupational History     Not on file   Tobacco Use     Smoking status: Never     Smokeless tobacco: Former     Types: Chew   Substance and Sexual Activity     Alcohol use: Yes     Comment: Alcoholic Drinks/day: 5 beers per week     Drug use: No     Sexual activity: Not on file   Other Topics Concern     Not on file   Social History Narrative     Not on file     Social Determinants of Health     Financial Resource Strain: Not on file   Food Insecurity: Not on file   Transportation Needs: Not on file   Physical Activity: Not on file   Stress: Not on file   Social Connections: Not on file   Intimate Partner Violence: Not on file   Housing Stability: Not on file           Medications  Allergies   Current Outpatient Medications   Medication Sig Dispense Refill     ASMANEX  mcg/actuation HFAA Inhale 2 puffs into the lungs 2 times daily       atorvastatin (LIPITOR) 40 MG tablet Take 1 tablet (40 mg) by mouth daily 90 tablet 3     carvedilol (COREG) 12.5 MG tablet Take 1.5 tablets (18.75 mg) by mouth 2 times daily (with meals) 180 tablet 3     enalapril (VASOTEC) 10 MG tablet Take 1 tablet (10 mg) by mouth daily 90 tablet 3     furosemide (LASIX) 20 MG tablet Take 1 tablet (20 mg) by mouth 2 times daily 180 tablet 3     sertraline (ZOLOFT) 50 MG tablet [SERTRALINE (ZOLOFT) 50 MG TABLET] Take 50 mg by mouth daily.       spironolactone  (ALDACTONE) 25 MG tablet Take 1 tablet (25 mg) by mouth daily 90 tablet 3     VENTOLIN HFA 90 mcg/actuation inhaler [VENTOLIN HFA 90 MCG/ACTUATION INHALER] Take 2 puffs by mouth every 4 (four) hours as needed.         0     warfarin ANTICOAGULANT (COUMADIN/JANTOVEN) 3 MG tablet [WARFARIN ANTICOAGULANT (COUMADIN/JANTOVEN) 3 MG TABLET] Take 1 tablets (3 mg) by mouth daily. Adjust dose based on INR results as directed. 30 tablet 0     No Known Allergies       Lab Results    Chemistry/lipid CBC Cardiac Enzymes/BNP/TSH/INR   Recent Labs   Lab Test 03/16/22  0741   CHOL 174   HDL 52      TRIG 112     Recent Labs   Lab Test 03/16/22  0741 01/28/22  0836 03/18/21  1544    111 147*     Recent Labs   Lab Test 09/01/22  0902      POTASSIUM 4.5   CHLORIDE 107   CO2 26      BUN 22   CR 0.77   GFRESTIMATED >90   ELLIS 8.9     Recent Labs   Lab Test 09/01/22  0902 03/16/22  0741 01/28/22  0846   CR 0.77 0.91 0.80     No results for input(s): A1C in the last 63783 hours.       Recent Labs   Lab Test 03/20/21  0439   WBC 10.1   HGB 16.4   HCT 47.8   MCV 88        Recent Labs   Lab Test 03/20/21  0439 03/19/21  0420 03/18/21  1544   HGB 16.4 17.2 17.2    Recent Labs   Lab Test 03/18/21  1544 03/17/21  1118 03/15/21  0832   TROPONINI 0.03 0.01 0.04     Recent Labs   Lab Test 01/28/22  0846 09/30/21  0825 06/22/21  0843   BNP 49* 79* 18     Recent Labs   Lab Test 04/22/19  1924   TSH 1.96     Recent Labs   Lab Test 01/28/22  0836 03/22/21  0733 03/21/21  0451   INR 1.4* 1.13* 1.05        Grover Oliveira MD    Thank you for allowing me to participate in the care of your patient.      Sincerely,     Grover Oliveira MD     [facility]    cc:   Grover Oliveira MD  [address] [facility] [address]  [place] [address]

## 2022-11-08 NOTE — PROGRESS NOTES
HEART CARE ENCOUNTER CONSULTATON NOTE      [facility]  [phone]      Assessment/Recommendations   Assessment/Plan:  1.  Heart failure with reduced ejection fraction, nonischemic cardiomyopathy with question prior history of myocarditis.  Clinically doing well.  No signs or symptoms of congestive heart failure with flat neck veins, clear lung fields.  Echocardiogram from January 2020 demonstrates an improvement in systolic function with an ejection fraction most recently of 50 to 55%.  Plan to check additional laboratory studies today and if stable would consider decreasing the furosemide to 20 mg daily and reassessing.    2.  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  Appreciate input from Dr. Barrow.  Dr. Barrow recommended in May 2022 that if ejection fraction remains stable in 1 year warfarin could be discontinued.  We will plan echocardiography prior to his planned hip surgery in February and then we can make additional comments at that time.  He has been somewhat lax with respect to getting his INR checked.  We did talk about importance of having his INR checked regularly and the increased risk of bleeding.    3.  Dyslipidemia.  He has been on atorvastatin post acute CVA.  Lipids are from March 2022 at which time his LDL was 100.  Dr. Barrow mentioned striving for an LDL of 70 and we will plan to recheck lipids today.  He has been more sedentary related to his hip surgery.    Plan.  Laboratory studies including lipid, repeat potassium and proBNP, basic metabolic profile was stable in September.  2.  Consider decreasing furosemide to 20 mg daily  3.  Echocardiogram within the next few months as part of work-up for clearance for hip surgery  4.  Follow-up in 6 months  5.  We will check INR today but typically he gets his INR checked through his primary care provider which she would like to continue to do.         History of Present Illness/Subjective    HPI: Matthew W Behr is a 57 year old male who  returns for follow-up.  He reports that he has been feeling well from a cardiovascular standpoint.  He specifically denies shortness of breath, orthopnea, PND, dizziness or palpitation.  He has been more sedentary related to hip discomfort and plans to have hip replacement surgery in February.  He reports that he has been taking his medications regularly although has been lax with respect to follow-up INR which he historically has done through his primary care provider.  I reviewed why it is important to have his INR checked on a regular basis.  I reviewed notes from Dr. Barrow from neurology May 2022 who suggested that he be on warfarin for 1 more year and if the echocardiogram looks stable at that point warfarin could be discontinued.  He suffered a cerebral infarction due to embolism of the right middle artery in March 2021 after he had discontinued his cardiovascular medications and ejection fraction fell to 17%.  Most recent echocardiogram from February 14 shows ejection fraction to has improved to 50 to 55%.  In addition Dr. Barrow recommended that we strive for an LDL goal less than 70.  He had lipids from March where his LDL was 100 although I do not see that there was any additional adjustment in his medications at that time.  Cardiac MRI results were also reviewed.  In July 2021 there was a small area of minimal fibrosis and a nonischemic pattern with suggestion of prior myocarditis.    Recent Echocardiogram Results:  Echocardiogram Complete  Order: [id number]   Status: Edited Result - FINAL      Visible to patient: Yes (not seen)      Next appt: 11/08/2022 at 08:20 AM in Cardiology (Grover Oliveira MD)      Dx: Chronic systolic congestive heart kahlil...      4 Result Notes  Details    Reading Physician Reading Date Result Priority   Kuldip Cheung MD  [phone] 2/14/2022      Narrative & Impression  [id number]  [id number]  [id number]  [id number]^SELVIN^CIARA     [address]  [address]     Name: BEHR, MATTHEW W  MRN: [id number]  : 1965  Study Date: 2022 08:10 AM  Age: 56 yrs  Gender: Male  Patient Location: On license of [facility]  Reason For Study: Chronic systolic congestive heart failure (H)  Ordering Physician: DAVIS MONTALVO  Referring Physician: DAVIS MONTALVO  Performed By: ACE     BSA: 2.1 m2  Height: 69 in  Weight: 204 lb  HR: 60  ______________________________________________________________________________  Procedure  Complete Echo Adult. Definity (NDC #48475-000) given intravenously. 4mL given.  LOT#[id number].  ______________________________________________________________________________  Interpretation Summary     The left ventricle is normal in size.  Left ventricular function is decreased. The ejection fraction is 50-55%  (borderline).  No regional wall motion abnormalities noted.  Normal right ventricle size and systolic function.  Compared to 3/18/21 LVEF is significantly improved. No hemodynamically  significant valvular abnormalities on 2D or color flow imaging.  ______________________________________________________________________________  Left Ventricle  The left ventricle is normal in size. Left ventricular function is decreased.  The ejection fraction is 50-55% (borderline). There is normal left ventricular  wall thickness. Left ventricular diastolic function is normal. No regional  wall motion abnormalities noted.     Right Ventricle  Normal right ventricle size and systolic function.     Atria  Normal left atrial size. Right atrial size is normal. There is no color  Doppler evidence of an atrial shunt.     Mitral Valve  Mitral valve leaflets appear normal. There is no evidence of mitral stenosis  or clinically significant mitral regurgitation. There is mild (1+) mitral  regurgitation.     Tricuspid Valve  Tricuspid valve leaflets appear normal. Right ventricle systolic pressure  estimate normal. There is trace to mild tricuspid regurgitation.     Aortic Valve  Aortic  valve leaflets appear normal. There is no evidence of aortic stenosis  or clinically significant aortic regurgitation.     Pulmonic Valve  The pulmonic valve is not well seen, but is grossly normal. This degree of  valvular regurgitation is within normal limits.     Vessels  The aorta root is normal. Normal size ascending aorta. Inferior vena cava not  well visualized for estimation of right atrial pressure.     Pericardium  There is no pericardial effusion.        Imaging      PACS Images     Show images for MR Cardiac with Contrast     Study Result    Narrative & Impression                                                    [facility]                                                      CMR Report       MRN:                  [id number]                                  Name:            BEHR, MATTHEW W                                  :                  1965                                  Scan Date:   2021 09:37:12                                   Electronically signed by Maxime Hernandez  14:19:47     VITALS   ==========================================================================================================     HEIGHT: 69.02 in    (175.30 cm)  WEIGHT: 191.01 lbs    (86.64 kgs)  BSA: 2.03 m^2     SUMMARY   ==========================================================================================================     1.  Normal left ventricular size and wall thickness. Systolic function is mildly reduced with global  hypokinesis. The quantified left ventricular ejection fraction is 43%.   2.  Normal right ventricular size. Systolic function is mildly reduced. The quantified right ventricular  ejection fraction is 39%.   3.  There is a small area of midwall fibrosis in the basal inferolateral wall, in a nonischemic pattern.  The location and appearance is typical of the sequelae of a prior episode of myocarditis.  4.  No significant valvular abnormalities.       ==========================================================================================================  REPORT FINDINGS:     LEFT VENTRICLE: LV wall thickness is normal. LV cavity size is normal. LV systolic function is mildly decreased with global  hypokinesis. There is no LV mass/thrombus. LV systolic function is normal. The native (pre-contrast)  myocardial T1 value measures 1000 ms. Quantitative LVEF 43 %.     VIABILITY: LV scar size is 1 %. There is patchy midwall delayed enhancement in the basal inferolateral wall.     RIGHT VENTRICLE: RV wall thickness is normal. RV cavity size is normal. RV systolic function is mildly reduced. There is no  RV mass/thrombus. Quantitative RVEF 39 %.     LA/RA SEPTUM: The atrial septum appears normal.     LEFT ATRIUM: LA cavity size is normal.     RIGHT ATRIUM: RA cavity size is normal.     PERICARDIUM: Pericardium is normal. There is no pericardial effusion.     PLEURAL EFFUSION: There is no pleural effusion.     AORTIC VALVE: Aortic valve is trileaflet. There is no aortic regurgitation. There is no aortic stenosis.      MITRAL VALVE: Mitral valve leaflets are normal. There is no mitral regurgitation. There is no mitral stenosis.      TRICUSPID VALVE: Tricuspid valve leaflets are normal. There is no tricuspid regurgitation. There is no tricuspid stenosis.      PULMONIC VALVE: Pulmonic valve leaflets are normal. There is no pulmonic regurgitation. There is no pulmonic stenosis.      AORTIC ROOT: The aortic root is normal.     CHEST:  Normal thoracic aortic size and its major branches.         Per 5/4/22 Neurology visit:     H/O Right MCA infarction  56-year-old male with history of asthma, heart failure was admitted to the hospital March 2021 with right MCA infarction his ejection fraction was 17% and was started on Coumadin.  Most recent echocardiogram on 2/14/2022 shows borderline ejection fraction at 50 to 55%.  I have recommended:     1.  Continue Coumadin with goal of  INR between 2-3 at least for 1 more year  2.  Repeat echocardiogram after 1 year  3.  Check lipid profile and adjust dose of Lipitor with goal of LDL less than 70  4.  Follow-up will be in 1 year      MRN:                  [id number]                                  Name:            BEHR, MATTHEW W                                  :                  1965                                  Scan Date:   2021 09:37:12                                   Electronically signed by Maxime Hernandez  14:19:47     VITALS   ==========================================================================================================     HEIGHT: 69.02 in    (175.30 cm)  WEIGHT: 191.01 lbs    (86.64 kgs)  BSA: 2.03 m^2     SUMMARY   ==========================================================================================================     1.  Normal left ventricular size and wall thickness. Systolic function is mildly reduced with global  hypokinesis. The quantified left ventricular ejection fraction is 43%.   2.  Normal right ventricular size. Systolic function is mildly reduced. The quantified right ventricular  ejection fraction is 39%.   3.  There is a small area of midwall fibrosis in the basal inferolateral wall, in a nonischemic pattern.  The location and appearance is typical of the sequelae of a prior episode of myocarditis.  4.  No significant valvular abnormalities.    Recent Coronary Angiogram Results:    1/3/18  8:22 AM 1/3/18 11:28 AM [id number] [facility] [place]        [id number]       PACS Images     Show images for CTA Angiogram coronary artery     Study Result    Narrative & Impression     The total Agatston calcium score is 0. A calcium score of zero places the individual in the lowest quartile when compared to an age and gender matched control group and implies a low risk of cardiac events in the next 10 years. (less than 1% per   year).    No detectable  atherosclerotic plaque or significant stenosis observed.    Significant misregistration artifact decreases ability to interpret the examination the findings are most consistent with artifact rather than obstructive artery disease.    Mild dilatation of the proximal aorta at the level of the sinuses of Valsalva    Please see radiology interpretation for additional findings.          Physical Examination  Review of Systems   Vitals: 106/76, weight 216 pounds, heart rate of 67 and regular  Wt Readings from Last 3 Encounters:   09/01/22 94.3 kg (208 lb)   05/04/22 93 kg (205 lb)   01/28/22 92.5 kg (204 lb)       General Appearance:   no distress, normal body habitus   ENT/Mouth:  Wearing a facemask   EYES:  no scleral icterus, normal conjunctivae   Neck: no carotid bruits    Chest/Lungs:   lungs are clear to auscultation, no rales or wheezing, equal chest wall expansion    Cardiovascular:   Regular. Normal first and second heart sounds with no murmurs, rubs, or gallops; the carotid, radial and posterior tibial pulses are intact, Jugular venous pressure within normal limits, no edema bilaterally    Abdomen:  no  bruits, or tenderness; bowel sounds are present   Extremities: no cyanosis or clubbing   Skin: no xanthelasma, warm.    Neurologic: , no tremors     Psychiatric: alert and oriented x3, calm        Please refer above for cardiac ROS details.        Medical History  Surgical History Family History Social History   Past Medical History:   Diagnosis Date     Acute respiratory failure with hypoxia (H)      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     Asthma      Cellulitis     Left hand     Chest pain with normal coronary angiography 01/03/2018     Congestive heart failure (H)      Hypertension      Nonischemic cardiomyopathy (H) 01/10/2018     Janel Parkinson White pattern seen on electrocardiogram      Past Surgical History:   Procedure Laterality Date     OTHER SURGICAL HISTORY  About 10 + years ago    OTHER SURGICAL  HISTORYCardiac surgery for Janel Parkinson [facility]     Family History   Problem Relation Age of Onset     Chronic Obstructive Pulmonary Disease Mother         Social History     Socioeconomic History     Marital status:      Spouse name: Not on file     Number of children: Not on file     Years of education: Not on file     Highest education level: Not on file   Occupational History     Not on file   Tobacco Use     Smoking status: Never     Smokeless tobacco: Former     Types: Chew   Substance and Sexual Activity     Alcohol use: Yes     Comment: Alcoholic Drinks/day: 5 beers per week     Drug use: No     Sexual activity: Not on file   Other Topics Concern     Not on file   Social History Narrative     Not on file     Social Determinants of Health     Financial Resource Strain: Not on file   Food Insecurity: Not on file   Transportation Needs: Not on file   Physical Activity: Not on file   Stress: Not on file   Social Connections: Not on file   Intimate Partner Violence: Not on file   Housing Stability: Not on file           Medications  Allergies   Current Outpatient Medications   Medication Sig Dispense Refill     ASMANEX  mcg/actuation HFAA Inhale 2 puffs into the lungs 2 times daily       atorvastatin (LIPITOR) 40 MG tablet Take 1 tablet (40 mg) by mouth daily 90 tablet 3     carvedilol (COREG) 12.5 MG tablet Take 1.5 tablets (18.75 mg) by mouth 2 times daily (with meals) 180 tablet 3     enalapril (VASOTEC) 10 MG tablet Take 1 tablet (10 mg) by mouth daily 90 tablet 3     furosemide (LASIX) 20 MG tablet Take 1 tablet (20 mg) by mouth 2 times daily 180 tablet 3     sertraline (ZOLOFT) 50 MG tablet [SERTRALINE (ZOLOFT) 50 MG TABLET] Take 50 mg by mouth daily.       spironolactone (ALDACTONE) 25 MG tablet Take 1 tablet (25 mg) by mouth daily 90 tablet 3     VENTOLIN HFA 90 mcg/actuation inhaler [VENTOLIN HFA 90 MCG/ACTUATION INHALER] Take 2 puffs by mouth every 4 (four) hours as needed.          0     warfarin ANTICOAGULANT (COUMADIN/JANTOVEN) 3 MG tablet [WARFARIN ANTICOAGULANT (COUMADIN/JANTOVEN) 3 MG TABLET] Take 1 tablets (3 mg) by mouth daily. Adjust dose based on INR results as directed. 30 tablet 0     No Known Allergies       Lab Results    Chemistry/lipid CBC Cardiac Enzymes/BNP/TSH/INR   Recent Labs   Lab Test 03/16/22  0741   CHOL 174   HDL 52      TRIG 112     Recent Labs   Lab Test 03/16/22  0741 01/28/22  0836 03/18/21  1544    111 147*     Recent Labs   Lab Test 09/01/22  0902      POTASSIUM 4.5   CHLORIDE 107   CO2 26      BUN 22   CR 0.77   GFRESTIMATED >90   ELLIS 8.9     Recent Labs   Lab Test 09/01/22  0902 03/16/22  0741 01/28/22  0846   CR 0.77 0.91 0.80     No results for input(s): A1C in the last 96920 hours.       Recent Labs   Lab Test 03/20/21  0439   WBC 10.1   HGB 16.4   HCT 47.8   MCV 88        Recent Labs   Lab Test 03/20/21  0439 03/19/21  0420 03/18/21  1544   HGB 16.4 17.2 17.2    Recent Labs   Lab Test 03/18/21  1544 03/17/21  1118 03/15/21  0832   TROPONINI 0.03 0.01 0.04     Recent Labs   Lab Test 01/28/22  0846 09/30/21  0825 06/22/21  0843   BNP 49* 79* 18     Recent Labs   Lab Test 04/22/19  1924   TSH 1.96     Recent Labs   Lab Test 01/28/22  0836 03/22/21  0733 03/21/21  0451   INR 1.4* 1.13* 1.05        Grover Oliveira MD

## 2022-11-11 DIAGNOSIS — I42.8 NONISCHEMIC CARDIOMYOPATHY (H): ICD-10-CM

## 2022-11-11 DIAGNOSIS — I50.9 ACUTE DECOMPENSATED HEART FAILURE (H): ICD-10-CM

## 2022-11-11 DIAGNOSIS — Z86.73 HISTORY OF CVA (CEREBROVASCULAR ACCIDENT): Primary | ICD-10-CM

## 2022-11-11 RX ORDER — EZETIMIBE 10 MG/1
10 TABLET ORAL DAILY
Qty: 90 TABLET | Refills: 1 | Status: SHIPPED | OUTPATIENT
Start: 2022-11-11 | End: 2023-03-29

## 2022-11-11 RX ORDER — FUROSEMIDE 20 MG
20 TABLET ORAL DAILY
Qty: 90 TABLET | Refills: 3 | Status: SHIPPED | OUTPATIENT
Start: 2022-11-11 | End: 2023-07-20

## 2022-11-11 NOTE — PROGRESS NOTES
Grover Oliveira MD   11/10/2022  8:46 PM CST Back to Top      Blood work looks ok except cholesterol numbers, he can try decreasing the furosemide to 20mg daily, monitor weights and swelling and update us in a few weeks if doing ok or sooner if gaining fluid weight, if willing lets at generic zetia to med regimen with repeat cholesterol and ast, alt in a few months  ty mdg     Furosemide dose decreased.  Ezetimibe ordered.  FLP, ALT, and AST ordered.  -kleber

## 2022-11-30 ENCOUNTER — TRANSFERRED RECORDS (OUTPATIENT)
Dept: HEALTH INFORMATION MANAGEMENT | Facility: CLINIC | Age: 57
End: 2022-11-30

## 2022-11-30 ENCOUNTER — LAB REQUISITION (OUTPATIENT)
Dept: LAB | Facility: CLINIC | Age: 57
End: 2022-11-30

## 2022-11-30 DIAGNOSIS — Z86.73 PERSONAL HISTORY OF TRANSIENT ISCHEMIC ATTACK (TIA), AND CEREBRAL INFARCTION WITHOUT RESIDUAL DEFICITS: ICD-10-CM

## 2022-11-30 LAB
ALBUMIN SERPL BCG-MCNC: 4 G/DL (ref 3.5–5.2)
ALP SERPL-CCNC: 88 U/L (ref 40–129)
ALT SERPL W P-5'-P-CCNC: 43 U/L (ref 10–50)
ANION GAP SERPL CALCULATED.3IONS-SCNC: 14 MMOL/L (ref 7–15)
AST SERPL W P-5'-P-CCNC: 50 U/L (ref 10–50)
BILIRUB SERPL-MCNC: 1.4 MG/DL
BUN SERPL-MCNC: 29.1 MG/DL (ref 6–20)
CALCIUM SERPL-MCNC: 8.5 MG/DL (ref 8.6–10)
CHLORIDE SERPL-SCNC: 100 MMOL/L (ref 98–107)
CREAT SERPL-MCNC: 1.03 MG/DL (ref 0.67–1.17)
DEPRECATED HCO3 PLAS-SCNC: 26 MMOL/L (ref 22–29)
GFR SERPL CREATININE-BSD FRML MDRD: 85 ML/MIN/1.73M2
GLUCOSE SERPL-MCNC: 107 MG/DL (ref 70–99)
POTASSIUM SERPL-SCNC: 3.7 MMOL/L (ref 3.4–5.3)
PROT SERPL-MCNC: 6.6 G/DL (ref 6.4–8.3)
SODIUM SERPL-SCNC: 140 MMOL/L (ref 136–145)

## 2022-11-30 PROCEDURE — 80053 COMPREHEN METABOLIC PANEL: CPT | Performed by: NURSE PRACTITIONER

## 2022-12-09 ENCOUNTER — TELEPHONE (OUTPATIENT)
Dept: PHARMACY | Facility: PHYSICIAN GROUP | Age: 57
End: 2022-12-09

## 2022-12-09 NOTE — TELEPHONE ENCOUNTER
Contacted patient to schedule medication review visit with [facility] pharmacist. Left [facility] scheduling number [phone] for patient to call and schedule.    Roque Jean-BaptisteD, [facility]  Medication Therapy Management Pharmacist  [facility]  Pager: [phone]

## 2023-01-09 ENCOUNTER — HOSPITAL ENCOUNTER (OUTPATIENT)
Dept: CARDIOLOGY | Facility: HOSPITAL | Age: 58
Discharge: HOME | End: 2023-01-09
Attending: INTERNAL MEDICINE | Admitting: INTERNAL MEDICINE
Payer: COMMERCIAL

## 2023-01-09 DIAGNOSIS — I42.8 NONISCHEMIC CARDIOMYOPATHY (H): ICD-10-CM

## 2023-01-09 LAB — LVEF ECHO: NORMAL

## 2023-01-09 PROCEDURE — 999N000208 ECHOCARDIOGRAM COMPLETE

## 2023-01-09 PROCEDURE — 255N000002 HC RX 255 OP 636: Performed by: INTERNAL MEDICINE

## 2023-01-09 PROCEDURE — 93306 TTE W/DOPPLER COMPLETE: CPT | Mod: 26 | Performed by: INTERNAL MEDICINE

## 2023-01-09 RX ADMIN — PERFLUTREN 2 ML: 6.52 INJECTION, SUSPENSION INTRAVENOUS at 08:32

## 2023-01-17 ENCOUNTER — TELEPHONE (OUTPATIENT)
Dept: PHARMACY | Facility: CLINIC | Age: 58
End: 2023-01-17
Payer: COMMERCIAL

## 2023-01-17 NOTE — TELEPHONE ENCOUNTER
MTM referral from: Patient's insurance     MTM referral outreach attempt #1 on January 17, 2023 at 3:01 PM      Outcome: Patient is not interested at this time     Adina Begum PharmD, [facility]  Medication Therapy Management Provider  Pager: [phone]

## 2023-01-26 ENCOUNTER — TRANSFERRED RECORDS (OUTPATIENT)
Dept: MULTI SPECIALTY CLINIC | Facility: CLINIC | Age: 58
End: 2023-01-26
Payer: COMMERCIAL

## 2023-01-26 ENCOUNTER — TRANSFERRED RECORDS (OUTPATIENT)
Dept: MULTI SPECIALTY CLINIC | Facility: CLINIC | Age: 58
End: 2023-01-26

## 2023-01-26 ENCOUNTER — MEDICAL CORRESPONDENCE (OUTPATIENT)
Dept: HEALTH INFORMATION MANAGEMENT | Facility: CLINIC | Age: 58
End: 2023-01-26
Payer: COMMERCIAL

## 2023-01-26 LAB
CREATININE (EXTERNAL): 0.82 MG/DL (ref 0.73–1.18)
GFR ESTIMATED (EXTERNAL): >60 ML/MIN/1.73M2
GLUCOSE (EXTERNAL): 101 MG/DL (ref 70–100)
HBA1C MFR BLD: 5.5 %
POTASSIUM (EXTERNAL): 4.5 MMOL/L (ref 3.5–5.1)

## 2023-01-31 RX ORDER — ENOXAPARIN SODIUM 100 MG/ML
40 INJECTION SUBCUTANEOUS DAILY
COMMUNITY
End: 2023-01-31

## 2023-01-31 RX ORDER — ENOXAPARIN SODIUM 100 MG/ML
95 INJECTION SUBCUTANEOUS EVERY 12 HOURS
Status: ON HOLD | COMMUNITY
End: 2023-02-08

## 2023-01-31 NOTE — PROGRESS NOTES
Planning to discharge home on POD 1 in the morning with his son picking him up from the hospital. His son and wife will be helping after surgery.       01/31/23 1056   Discharge Planning   Patient/Family Anticipates Transition to home with family   Concerns to be Addressed all concerns addressed in this encounter   Living Arrangements   People in Home child(bob), adult;spouse   Type of Residence Private Residence   Is your private residence a single family home or apartment? Single family home   Number of Stairs, Within Home, Primary none   Once home, are you able to live on one level? Yes   Which level? Main Level   Bathroom Shower/Tub Tub/Shower unit   Equipment Currently Used at Home grab bar, tub/shower   Support System   Support Systems Spouse/Significant Other;Children  (son, Gurinder, and wife, Margie)   Do you have someone available to stay with you one or two nights once you are home? Yes   Education   Patient attended total joint pre-op class/received pre-op teaching  email/phone call

## 2023-02-07 ENCOUNTER — ANESTHESIA EVENT (OUTPATIENT)
Dept: SURGERY | Facility: CLINIC | Age: 58
End: 2023-02-07
Payer: COMMERCIAL

## 2023-02-07 ENCOUNTER — APPOINTMENT (OUTPATIENT)
Dept: RADIOLOGY | Facility: CLINIC | Age: 58
End: 2023-02-07
Attending: ORTHOPAEDIC SURGERY
Payer: COMMERCIAL

## 2023-02-07 ENCOUNTER — ANESTHESIA (OUTPATIENT)
Dept: SURGERY | Facility: CLINIC | Age: 58
End: 2023-02-07
Payer: COMMERCIAL

## 2023-02-07 ENCOUNTER — HOSPITAL ENCOUNTER (OUTPATIENT)
Facility: CLINIC | Age: 58
Discharge: HOME | End: 2023-02-08
Attending: ORTHOPAEDIC SURGERY | Admitting: ORTHOPAEDIC SURGERY
Payer: COMMERCIAL

## 2023-02-07 DIAGNOSIS — M16.11 PRIMARY OSTEOARTHRITIS OF RIGHT HIP: Primary | ICD-10-CM

## 2023-02-07 LAB
ANION GAP SERPL CALCULATED.3IONS-SCNC: 7 MMOL/L (ref 5–18)
BASOPHILS # BLD AUTO: 0 10E3/UL (ref 0–0.2)
BASOPHILS NFR BLD AUTO: 0 %
BUN SERPL-MCNC: 24 MG/DL (ref 8–22)
CALCIUM SERPL-MCNC: 8.7 MG/DL (ref 8.5–10.5)
CHLORIDE BLD-SCNC: 104 MMOL/L (ref 98–107)
CO2 SERPL-SCNC: 25 MMOL/L (ref 22–31)
CREAT SERPL-MCNC: 0.86 MG/DL (ref 0.7–1.3)
EOSINOPHIL # BLD AUTO: 0 10E3/UL (ref 0–0.7)
EOSINOPHIL NFR BLD AUTO: 0 %
ERYTHROCYTE [DISTWIDTH] IN BLOOD BY AUTOMATED COUNT: 12.6 % (ref 10–15)
GFR SERPL CREATININE-BSD FRML MDRD: >90 ML/MIN/1.73M2
GLUCOSE BLD-MCNC: 160 MG/DL (ref 70–125)
HCT VFR BLD AUTO: 42.3 % (ref 40–53)
HGB BLD-MCNC: 14 G/DL (ref 13.3–17.7)
IMM GRANULOCYTES # BLD: 0.1 10E3/UL
IMM GRANULOCYTES NFR BLD: 1 %
INR PPP: 0.98 (ref 0.85–1.15)
LYMPHOCYTES # BLD AUTO: 0.9 10E3/UL (ref 0.8–5.3)
LYMPHOCYTES NFR BLD AUTO: 7 %
MCH RBC QN AUTO: 29.9 PG (ref 26.5–33)
MCHC RBC AUTO-ENTMCNC: 33.1 G/DL (ref 31.5–36.5)
MCV RBC AUTO: 90 FL (ref 78–100)
MONOCYTES # BLD AUTO: 0.2 10E3/UL (ref 0–1.3)
MONOCYTES NFR BLD AUTO: 2 %
NEUTROPHILS # BLD AUTO: 10.7 10E3/UL (ref 1.6–8.3)
NEUTROPHILS NFR BLD AUTO: 90 %
NRBC # BLD AUTO: 0 10E3/UL
NRBC BLD AUTO-RTO: 0 /100
PLATELET # BLD AUTO: 232 10E3/UL (ref 150–450)
POTASSIUM BLD-SCNC: 4.2 MMOL/L (ref 3.5–5)
RBC # BLD AUTO: 4.69 10E6/UL (ref 4.4–5.9)
SODIUM SERPL-SCNC: 136 MMOL/L (ref 136–145)
WBC # BLD AUTO: 11.8 10E3/UL (ref 4–11)

## 2023-02-07 PROCEDURE — 258N000003 HC RX IP 258 OP 636: Performed by: ANESTHESIOLOGY

## 2023-02-07 PROCEDURE — 250N000013 HC RX MED GY IP 250 OP 250 PS 637: Performed by: PHYSICIAN ASSISTANT

## 2023-02-07 PROCEDURE — 250N000011 HC RX IP 250 OP 636: Performed by: ANESTHESIOLOGY

## 2023-02-07 PROCEDURE — 250N000011 HC RX IP 250 OP 636: Performed by: PHYSICIAN ASSISTANT

## 2023-02-07 PROCEDURE — 250N000009 HC RX 250: Performed by: NURSE ANESTHETIST, CERTIFIED REGISTERED

## 2023-02-07 PROCEDURE — 250N000013 HC RX MED GY IP 250 OP 250 PS 637: Performed by: STUDENT IN AN ORGANIZED HEALTH CARE EDUCATION/TRAINING PROGRAM

## 2023-02-07 PROCEDURE — 272N000001 HC OR GENERAL SUPPLY STERILE: Performed by: ORTHOPAEDIC SURGERY

## 2023-02-07 PROCEDURE — 710N000010 HC RECOVERY PHASE 1, LEVEL 2, PER MIN: Performed by: ORTHOPAEDIC SURGERY

## 2023-02-07 PROCEDURE — 36415 COLL VENOUS BLD VENIPUNCTURE: CPT | Performed by: NURSE PRACTITIONER

## 2023-02-07 PROCEDURE — 99222 1ST HOSP IP/OBS MODERATE 55: CPT | Performed by: STUDENT IN AN ORGANIZED HEALTH CARE EDUCATION/TRAINING PROGRAM

## 2023-02-07 PROCEDURE — 360N000077 HC SURGERY LEVEL 4, PER MIN: Performed by: ORTHOPAEDIC SURGERY

## 2023-02-07 PROCEDURE — C1776 JOINT DEVICE (IMPLANTABLE): HCPCS | Performed by: ORTHOPAEDIC SURGERY

## 2023-02-07 PROCEDURE — 258N000001 HC RX 258: Performed by: ORTHOPAEDIC SURGERY

## 2023-02-07 PROCEDURE — 258N000003 HC RX IP 258 OP 636: Performed by: NURSE ANESTHETIST, CERTIFIED REGISTERED

## 2023-02-07 PROCEDURE — C1713 ANCHOR/SCREW BN/BN,TIS/BN: HCPCS | Performed by: ORTHOPAEDIC SURGERY

## 2023-02-07 PROCEDURE — 250N000011 HC RX IP 250 OP 636: Performed by: ORTHOPAEDIC SURGERY

## 2023-02-07 PROCEDURE — 278N000051 HC OR IMPLANT GENERAL: Performed by: ORTHOPAEDIC SURGERY

## 2023-02-07 PROCEDURE — 80048 BASIC METABOLIC PNL TOTAL CA: CPT | Performed by: STUDENT IN AN ORGANIZED HEALTH CARE EDUCATION/TRAINING PROGRAM

## 2023-02-07 PROCEDURE — 370N000017 HC ANESTHESIA TECHNICAL FEE, PER MIN: Performed by: ORTHOPAEDIC SURGERY

## 2023-02-07 PROCEDURE — 258N000003 HC RX IP 258 OP 636: Performed by: ORTHOPAEDIC SURGERY

## 2023-02-07 PROCEDURE — 999N000065 XR PELVIS PORT 1/2 VIEWS

## 2023-02-07 PROCEDURE — 85025 COMPLETE CBC W/AUTO DIFF WBC: CPT | Performed by: STUDENT IN AN ORGANIZED HEALTH CARE EDUCATION/TRAINING PROGRAM

## 2023-02-07 PROCEDURE — 85610 PROTHROMBIN TIME: CPT | Performed by: NURSE PRACTITIONER

## 2023-02-07 PROCEDURE — 250N000013 HC RX MED GY IP 250 OP 250 PS 637: Performed by: ORTHOPAEDIC SURGERY

## 2023-02-07 PROCEDURE — 250N000011 HC RX IP 250 OP 636: Performed by: NURSE ANESTHETIST, CERTIFIED REGISTERED

## 2023-02-07 PROCEDURE — 250N000009 HC RX 250: Performed by: ORTHOPAEDIC SURGERY

## 2023-02-07 PROCEDURE — 250N000009 HC RX 250: Performed by: PHYSICIAN ASSISTANT

## 2023-02-07 PROCEDURE — 36415 COLL VENOUS BLD VENIPUNCTURE: CPT | Performed by: STUDENT IN AN ORGANIZED HEALTH CARE EDUCATION/TRAINING PROGRAM

## 2023-02-07 PROCEDURE — 999N000141 HC STATISTIC PRE-PROCEDURE NURSING ASSESSMENT: Performed by: ORTHOPAEDIC SURGERY

## 2023-02-07 DEVICE — IMP HOLE COVER SNN ACETAB CUP REFLEC INTERFIT 71336500: Type: IMPLANTABLE DEVICE | Site: HIP | Status: FUNCTIONAL

## 2023-02-07 DEVICE — GUIDE PIN LONG ACETABULAR: Type: IMPLANTABLE DEVICE | Site: HIP | Status: FUNCTIONAL

## 2023-02-07 DEVICE — IMP SCR ACET SNN SPHERICAL HEAD 6.5X15MM 71332515: Type: IMPLANTABLE DEVICE | Site: HIP | Status: FUNCTIONAL

## 2023-02-07 DEVICE — HEAD OX MODULAR 40MM: Type: IMPLANTABLE DEVICE | Site: HIP | Status: FUNCTIONAL

## 2023-02-07 DEVICE — IMP SHELL SNR ACET R3 3H 56MM 71335556: Type: IMPLANTABLE DEVICE | Site: HIP | Status: FUNCTIONAL

## 2023-02-07 DEVICE — IMPLANTABLE DEVICE: Type: IMPLANTABLE DEVICE | Site: HIP | Status: FUNCTIONAL

## 2023-02-07 DEVICE — IMP SCR ACET SNN SPHERICAL HEAD 6.5X30MM 71332530: Type: IMPLANTABLE DEVICE | Site: HIP | Status: FUNCTIONAL

## 2023-02-07 DEVICE — IMP FEMORAL SLEEVE S&N SHORT MOD +0MM NECK TI 71344247: Type: IMPLANTABLE DEVICE | Site: HIP | Status: FUNCTIONAL

## 2023-02-07 RX ORDER — PROPOFOL 10 MG/ML
INJECTION, EMULSION INTRAVENOUS CONTINUOUS PRN
Status: DISCONTINUED | OUTPATIENT
Start: 2023-02-07 | End: 2023-02-07

## 2023-02-07 RX ORDER — FENTANYL CITRATE 50 UG/ML
INJECTION, SOLUTION INTRAMUSCULAR; INTRAVENOUS PRN
Status: DISCONTINUED | OUTPATIENT
Start: 2023-02-07 | End: 2023-02-07

## 2023-02-07 RX ORDER — EPHEDRINE SULFATE 50 MG/ML
INJECTION, SOLUTION INTRAMUSCULAR; INTRAVENOUS; SUBCUTANEOUS PRN
Status: DISCONTINUED | OUTPATIENT
Start: 2023-02-07 | End: 2023-02-07

## 2023-02-07 RX ORDER — ACETAMINOPHEN 325 MG/1
975 TABLET ORAL EVERY 8 HOURS
Status: DISCONTINUED | OUTPATIENT
Start: 2023-02-07 | End: 2023-02-08 | Stop reason: HOSPADM

## 2023-02-07 RX ORDER — NALOXONE HYDROCHLORIDE 0.4 MG/ML
0.2 INJECTION, SOLUTION INTRAMUSCULAR; INTRAVENOUS; SUBCUTANEOUS
Status: DISCONTINUED | OUTPATIENT
Start: 2023-02-07 | End: 2023-02-08 | Stop reason: HOSPADM

## 2023-02-07 RX ORDER — BISACODYL 10 MG
10 SUPPOSITORY, RECTAL RECTAL DAILY PRN
Status: DISCONTINUED | OUTPATIENT
Start: 2023-02-07 | End: 2023-02-08 | Stop reason: HOSPADM

## 2023-02-07 RX ORDER — HYDROXYZINE HYDROCHLORIDE 25 MG/1
25 TABLET, FILM COATED ORAL EVERY 6 HOURS PRN
Start: 2023-02-07

## 2023-02-07 RX ORDER — LIDOCAINE 40 MG/G
CREAM TOPICAL
Status: DISCONTINUED | OUTPATIENT
Start: 2023-02-07 | End: 2023-02-07 | Stop reason: HOSPADM

## 2023-02-07 RX ORDER — ACETAMINOPHEN 325 MG/1
975 TABLET ORAL ONCE
Status: CANCELLED | OUTPATIENT
Start: 2023-02-07 | End: 2023-02-07

## 2023-02-07 RX ORDER — CEFAZOLIN SODIUM 2 G/100ML
2 INJECTION, SOLUTION INTRAVENOUS EVERY 8 HOURS
Status: COMPLETED | OUTPATIENT
Start: 2023-02-07 | End: 2023-02-08

## 2023-02-07 RX ORDER — ONDANSETRON 4 MG/1
4 TABLET, ORALLY DISINTEGRATING ORAL EVERY 6 HOURS PRN
Status: DISCONTINUED | OUTPATIENT
Start: 2023-02-07 | End: 2023-02-08 | Stop reason: HOSPADM

## 2023-02-07 RX ORDER — METHOCARBAMOL 750 MG/1
750 TABLET, FILM COATED ORAL EVERY 6 HOURS PRN
Status: DISCONTINUED | OUTPATIENT
Start: 2023-02-07 | End: 2023-02-08 | Stop reason: HOSPADM

## 2023-02-07 RX ORDER — GLYCOPYRROLATE 0.2 MG/ML
INJECTION, SOLUTION INTRAMUSCULAR; INTRAVENOUS PRN
Status: DISCONTINUED | OUTPATIENT
Start: 2023-02-07 | End: 2023-02-07

## 2023-02-07 RX ORDER — OXYCODONE HYDROCHLORIDE 5 MG/1
5 TABLET ORAL EVERY 4 HOURS PRN
Status: CANCELLED | OUTPATIENT
Start: 2023-02-07

## 2023-02-07 RX ORDER — HYDROMORPHONE HCL IN WATER/PF 6 MG/30 ML
0.4 PATIENT CONTROLLED ANALGESIA SYRINGE INTRAVENOUS
Status: DISCONTINUED | OUTPATIENT
Start: 2023-02-07 | End: 2023-02-08 | Stop reason: HOSPADM

## 2023-02-07 RX ORDER — AMOXICILLIN 250 MG
1 CAPSULE ORAL 2 TIMES DAILY PRN
COMMUNITY
Start: 2023-02-07 | End: 2023-08-23

## 2023-02-07 RX ORDER — DEXAMETHASONE SODIUM PHOSPHATE 4 MG/ML
INJECTION, SOLUTION INTRA-ARTICULAR; INTRALESIONAL; INTRAMUSCULAR; INTRAVENOUS; SOFT TISSUE PRN
Status: DISCONTINUED | OUTPATIENT
Start: 2023-02-07 | End: 2023-02-07

## 2023-02-07 RX ORDER — ALBUTEROL SULFATE 90 UG/1
2 AEROSOL, METERED RESPIRATORY (INHALATION) EVERY 4 HOURS PRN
Status: DISCONTINUED | OUTPATIENT
Start: 2023-02-07 | End: 2023-02-08 | Stop reason: HOSPADM

## 2023-02-07 RX ORDER — OXYCODONE HYDROCHLORIDE 5 MG/1
5-10 TABLET ORAL EVERY 4 HOURS PRN
Refills: 0
Start: 2023-02-07 | End: 2023-02-07

## 2023-02-07 RX ORDER — OXYCODONE HYDROCHLORIDE 5 MG/1
10 TABLET ORAL EVERY 4 HOURS PRN
Status: CANCELLED | OUTPATIENT
Start: 2023-02-07

## 2023-02-07 RX ORDER — OXYCODONE HYDROCHLORIDE 5 MG/1
10 TABLET ORAL EVERY 4 HOURS PRN
Status: DISCONTINUED | OUTPATIENT
Start: 2023-02-07 | End: 2023-02-08 | Stop reason: HOSPADM

## 2023-02-07 RX ORDER — PROCHLORPERAZINE MALEATE 10 MG
10 TABLET ORAL EVERY 6 HOURS PRN
Status: DISCONTINUED | OUTPATIENT
Start: 2023-02-07 | End: 2023-02-08 | Stop reason: HOSPADM

## 2023-02-07 RX ORDER — SODIUM CHLORIDE, SODIUM LACTATE, POTASSIUM CHLORIDE, CALCIUM CHLORIDE 600; 310; 30; 20 MG/100ML; MG/100ML; MG/100ML; MG/100ML
INJECTION, SOLUTION INTRAVENOUS CONTINUOUS
Status: DISCONTINUED | OUTPATIENT
Start: 2023-02-07 | End: 2023-02-08

## 2023-02-07 RX ORDER — SODIUM CHLORIDE, SODIUM LACTATE, POTASSIUM CHLORIDE, CALCIUM CHLORIDE 600; 310; 30; 20 MG/100ML; MG/100ML; MG/100ML; MG/100ML
INJECTION, SOLUTION INTRAVENOUS CONTINUOUS
Status: DISCONTINUED | OUTPATIENT
Start: 2023-02-07 | End: 2023-02-07 | Stop reason: HOSPADM

## 2023-02-07 RX ORDER — ENOXAPARIN SODIUM 100 MG/ML
40 INJECTION SUBCUTANEOUS EVERY 24 HOURS
Status: DISCONTINUED | OUTPATIENT
Start: 2023-02-08 | End: 2023-02-08 | Stop reason: HOSPADM

## 2023-02-07 RX ORDER — CALCIUM CARBONATE 500 MG/1
500 TABLET, CHEWABLE ORAL 4 TIMES DAILY PRN
Status: DISCONTINUED | OUTPATIENT
Start: 2023-02-07 | End: 2023-02-08 | Stop reason: HOSPADM

## 2023-02-07 RX ORDER — FENTANYL CITRATE 50 UG/ML
25 INJECTION, SOLUTION INTRAMUSCULAR; INTRAVENOUS
Status: CANCELLED | OUTPATIENT
Start: 2023-02-07

## 2023-02-07 RX ORDER — CEFAZOLIN SODIUM/WATER 2 G/20 ML
2 SYRINGE (ML) INTRAVENOUS
Status: COMPLETED | OUTPATIENT
Start: 2023-02-07 | End: 2023-02-07

## 2023-02-07 RX ORDER — KETAMINE HYDROCHLORIDE 10 MG/ML
INJECTION INTRAMUSCULAR; INTRAVENOUS PRN
Status: DISCONTINUED | OUTPATIENT
Start: 2023-02-07 | End: 2023-02-07

## 2023-02-07 RX ORDER — ACETAMINOPHEN 500 MG
1000 TABLET ORAL EVERY 8 HOURS
COMMUNITY
Start: 2023-02-07 | End: 2023-08-23

## 2023-02-07 RX ORDER — HYDROXYZINE HYDROCHLORIDE 25 MG/1
25 TABLET, FILM COATED ORAL EVERY 6 HOURS PRN
Status: DISCONTINUED | OUTPATIENT
Start: 2023-02-07 | End: 2023-02-08 | Stop reason: HOSPADM

## 2023-02-07 RX ORDER — PROPOFOL 10 MG/ML
INJECTION, EMULSION INTRAVENOUS PRN
Status: DISCONTINUED | OUTPATIENT
Start: 2023-02-07 | End: 2023-02-07

## 2023-02-07 RX ORDER — ACETAMINOPHEN 325 MG/1
650 TABLET ORAL EVERY 4 HOURS PRN
Status: DISCONTINUED | OUTPATIENT
Start: 2023-02-10 | End: 2023-02-08 | Stop reason: HOSPADM

## 2023-02-07 RX ORDER — ONDANSETRON 2 MG/ML
4 INJECTION INTRAMUSCULAR; INTRAVENOUS EVERY 6 HOURS PRN
Status: DISCONTINUED | OUTPATIENT
Start: 2023-02-07 | End: 2023-02-08 | Stop reason: HOSPADM

## 2023-02-07 RX ORDER — OXYCODONE HYDROCHLORIDE 5 MG/1
5 TABLET ORAL EVERY 4 HOURS PRN
Status: DISCONTINUED | OUTPATIENT
Start: 2023-02-07 | End: 2023-02-08 | Stop reason: HOSPADM

## 2023-02-07 RX ORDER — POLYETHYLENE GLYCOL 3350 17 G/17G
17 POWDER, FOR SOLUTION ORAL DAILY
Status: DISCONTINUED | OUTPATIENT
Start: 2023-02-08 | End: 2023-02-08 | Stop reason: HOSPADM

## 2023-02-07 RX ORDER — CEFAZOLIN SODIUM/WATER 2 G/20 ML
2 SYRINGE (ML) INTRAVENOUS SEE ADMIN INSTRUCTIONS
Status: DISCONTINUED | OUTPATIENT
Start: 2023-02-07 | End: 2023-02-07 | Stop reason: HOSPADM

## 2023-02-07 RX ORDER — MAGNESIUM HYDROXIDE 1200 MG/15ML
LIQUID ORAL PRN
Status: DISCONTINUED | OUTPATIENT
Start: 2023-02-07 | End: 2023-02-07 | Stop reason: HOSPADM

## 2023-02-07 RX ORDER — ATORVASTATIN CALCIUM 40 MG/1
40 TABLET, FILM COATED ORAL EVERY EVENING
Status: DISCONTINUED | OUTPATIENT
Start: 2023-02-07 | End: 2023-02-08 | Stop reason: HOSPADM

## 2023-02-07 RX ORDER — TRANEXAMIC ACID 650 MG/1
1950 TABLET ORAL ONCE
Status: COMPLETED | OUTPATIENT
Start: 2023-02-07 | End: 2023-02-07

## 2023-02-07 RX ORDER — EZETIMIBE 10 MG/1
10 TABLET ORAL DAILY
Status: DISCONTINUED | OUTPATIENT
Start: 2023-02-07 | End: 2023-02-08 | Stop reason: HOSPADM

## 2023-02-07 RX ORDER — NALOXONE HYDROCHLORIDE 0.4 MG/ML
0.4 INJECTION, SOLUTION INTRAMUSCULAR; INTRAVENOUS; SUBCUTANEOUS
Status: DISCONTINUED | OUTPATIENT
Start: 2023-02-07 | End: 2023-02-08 | Stop reason: HOSPADM

## 2023-02-07 RX ORDER — ONDANSETRON 2 MG/ML
INJECTION INTRAMUSCULAR; INTRAVENOUS PRN
Status: DISCONTINUED | OUTPATIENT
Start: 2023-02-07 | End: 2023-02-07

## 2023-02-07 RX ORDER — AMOXICILLIN 250 MG
1 CAPSULE ORAL 2 TIMES DAILY
Status: DISCONTINUED | OUTPATIENT
Start: 2023-02-07 | End: 2023-02-08 | Stop reason: HOSPADM

## 2023-02-07 RX ORDER — LIDOCAINE 40 MG/G
CREAM TOPICAL
Status: DISCONTINUED | OUTPATIENT
Start: 2023-02-07 | End: 2023-02-08 | Stop reason: HOSPADM

## 2023-02-07 RX ORDER — HYDROMORPHONE HCL IN WATER/PF 6 MG/30 ML
0.2 PATIENT CONTROLLED ANALGESIA SYRINGE INTRAVENOUS
Status: DISCONTINUED | OUTPATIENT
Start: 2023-02-07 | End: 2023-02-08 | Stop reason: HOSPADM

## 2023-02-07 RX ORDER — BUPIVACAINE HYDROCHLORIDE 7.5 MG/ML
INJECTION, SOLUTION INTRASPINAL
Status: COMPLETED | OUTPATIENT
Start: 2023-02-07 | End: 2023-02-07

## 2023-02-07 RX ORDER — OXYCODONE HYDROCHLORIDE 5 MG/1
5-10 TABLET ORAL EVERY 4 HOURS PRN
Refills: 0
Start: 2023-02-07 | End: 2023-05-02

## 2023-02-07 RX ADMIN — DEXAMETHASONE SODIUM PHOSPHATE 10 MG: 4 INJECTION, SOLUTION INTRA-ARTICULAR; INTRALESIONAL; INTRAMUSCULAR; INTRAVENOUS; SOFT TISSUE at 13:08

## 2023-02-07 RX ADMIN — OXYCODONE HYDROCHLORIDE 5 MG: 5 TABLET ORAL at 20:27

## 2023-02-07 RX ADMIN — ONDANSETRON 4 MG: 2 INJECTION INTRAMUSCULAR; INTRAVENOUS at 13:08

## 2023-02-07 RX ADMIN — SODIUM CHLORIDE, POTASSIUM CHLORIDE, SODIUM LACTATE AND CALCIUM CHLORIDE: 600; 310; 30; 20 INJECTION, SOLUTION INTRAVENOUS at 16:46

## 2023-02-07 RX ADMIN — FENTANYL CITRATE 50 MCG: 50 INJECTION, SOLUTION INTRAMUSCULAR; INTRAVENOUS at 12:32

## 2023-02-07 RX ADMIN — GLYCOPYRROLATE 0.2 MG: 0.2 INJECTION, SOLUTION INTRAMUSCULAR; INTRAVENOUS at 12:22

## 2023-02-07 RX ADMIN — Medication 5 MG: at 12:53

## 2023-02-07 RX ADMIN — ATORVASTATIN CALCIUM 40 MG: 40 TABLET, FILM COATED ORAL at 20:27

## 2023-02-07 RX ADMIN — BUPIVACAINE HYDROCHLORIDE IN DEXTROSE 1.8 ML: 7.5 INJECTION, SOLUTION SUBARACHNOID at 12:35

## 2023-02-07 RX ADMIN — SODIUM CHLORIDE, POTASSIUM CHLORIDE, SODIUM LACTATE AND CALCIUM CHLORIDE: 600; 310; 30; 20 INJECTION, SOLUTION INTRAVENOUS at 10:22

## 2023-02-07 RX ADMIN — PROPOFOL 100 MCG/KG/MIN: 10 INJECTION, EMULSION INTRAVENOUS at 12:40

## 2023-02-07 RX ADMIN — PHENYLEPHRINE HYDROCHLORIDE 100 MCG: 10 INJECTION INTRAVENOUS at 13:34

## 2023-02-07 RX ADMIN — SODIUM CHLORIDE, POTASSIUM CHLORIDE, SODIUM LACTATE AND CALCIUM CHLORIDE: 600; 310; 30; 20 INJECTION, SOLUTION INTRAVENOUS at 13:22

## 2023-02-07 RX ADMIN — Medication 2 G: at 12:22

## 2023-02-07 RX ADMIN — EZETIMIBE 10 MG: 10 TABLET ORAL at 17:52

## 2023-02-07 RX ADMIN — Medication 5 MG: at 13:19

## 2023-02-07 RX ADMIN — KETAMINE HYDROCHLORIDE 15 MG: 10 INJECTION, SOLUTION INTRAMUSCULAR; INTRAVENOUS at 12:56

## 2023-02-07 RX ADMIN — Medication 5 MG: at 12:59

## 2023-02-07 RX ADMIN — ACETAMINOPHEN 975 MG: 325 TABLET ORAL at 17:47

## 2023-02-07 RX ADMIN — PHENYLEPHRINE HYDROCHLORIDE 100 MCG: 10 INJECTION INTRAVENOUS at 12:41

## 2023-02-07 RX ADMIN — TRANEXAMIC ACID 1950 MG: 650 TABLET ORAL at 10:20

## 2023-02-07 RX ADMIN — CEFAZOLIN SODIUM 2 G: 2 INJECTION, SOLUTION INTRAVENOUS at 20:27

## 2023-02-07 RX ADMIN — MIDAZOLAM 2 MG: 1 INJECTION INTRAMUSCULAR; INTRAVENOUS at 12:22

## 2023-02-07 RX ADMIN — PROPOFOL 40 MG: 10 INJECTION, EMULSION INTRAVENOUS at 12:40

## 2023-02-07 ASSESSMENT — ACTIVITIES OF DAILY LIVING (ADL)
ADLS_ACUITY_SCORE: 33
ADLS_ACUITY_SCORE: 34

## 2023-02-07 NOTE — BRIEF OP NOTE
[facility]    Brief Operative Note    Pre-operative diagnosis: Osteoarthritis of right hip [M16.11]  Post-operative diagnosis Same as pre-operative diagnosis    Procedure: Procedure(s):  RIGHT TOTAL HIP ARTHROPLASTY  Surgeon: Surgeon(s) and Role:     * Agustín Virk MD - Primary     * Sekou Valero PA-C - Assisting  Anesthesia: Spinal   Estimated blood loss: 200 ml  Drains: None  Specimens: None  Findings:   None.  Complications: None.  Implants:   Implant Name Type Inv. Item Serial No.  Lot No. LRB No. Used Action   GUIDE PIN LONG ACETABULAR - [id number] Wire GUIDE PIN LONG ACETABULAR  [employer] [id number] Right 1 Used as a Supply   IMP SHELL SNR ACET R3 3H 56MM [id number] - [id number] Total Joint Component/Insert IMP SHELL SNR ACET R3 3H 56MM [id number]  [employer] [id number] Right 1 Implanted   LINER R3 20 DEG +4 XLPE ACET [id number] - [id number] Total Joint Component/Insert LINER R3 20 DEG +4 XLPE ACET [id number]  [employer] [id number] Right 1 Implanted   IMP HOLE COVER SNN ACETAB CUP REFLEC INTERFIT [id number] - [id number] Metallic Hardware/[employer]mler IMP HOLE COVER SNN ACETAB CUP REFLEC INTERFIT [id number]  [employer] [id number] Right 1 Implanted   IMP SCR ACET SNN SPHERICAL HEAD 6.5X30MM [id number] - [id number] Metallic Hardware/[employer]mler IMP SCR ACET SNN SPHERICAL HEAD 6.5X30MM [id number]  [employer] [id number] Right 1 Implanted   IMP SCR ACET SNN SPHERICAL HEAD 6.5X15MM [id number] - [id number] Metallic Hardware/[employer]mler IMP SCR ACET SNN SPHERICAL HEAD 6.5X15MM [id number]  [employer] [id number] Right 1 Implanted   POLARSTEM STEM STANDARD WITH TI/HA CONE 12/14 SIZE 7    [employer] [id number] Right 1 Implanted   HEAD OX MODULAR 40MM - [id number] Total Joint Component/Insert HEAD OX MODULAR 40MM  [employer] [id number] Right 1 Implanted   IMP FEMORAL SLEEVE S&N SHORT MOD +0MM NECK TI [id number] - [id number] Total Joint  Component/Insert IMP FEMORAL SLEEVE S&N SHORT MOD +0MM NECK TI [id number]  [employer] [id number] Right 1 Implanted       Plan:    WBAT  F/U in 2 weeks  Leave Aquacel for 10 days  DVT Prophylaxis:  Restart Warfarin tonight.  Will give Lovenox until INR is equal or greater than 1.7, then discontinue Lovenox.

## 2023-02-07 NOTE — PHARMACY-ADMISSION MEDICATION HISTORY
Pharmacy Note - Admission Medication History    Pertinent Provider Information: N/A   ______________________________________________________________________    Prior To Admission (PTA) med list completed and updated in EMR.       PTA Med List   Medication Sig Note Last Dose     ASMANEX  mcg/actuation HFAA Inhale 2 puffs into the lungs 2 times daily  2/7/2023 at AM, has with     atorvastatin (LIPITOR) 40 MG tablet Take 1 tablet (40 mg) by mouth daily  2/6/2023 at AM     carvedilol (COREG) 12.5 MG tablet Take 1.5 tablets (18.75 mg) by mouth 2 times daily (with meals)  2/7/2023 at AM     enalapril (VASOTEC) 10 MG tablet Take 1 tablet (10 mg) by mouth daily  2/6/2023 at AM     enoxaparin ANTICOAGULANT (LOVENOX) 100 MG/ML syringe Inject 95 mg Subcutaneous every 12 hours Bridging before surgery when warfarin stopped 2/1/2023: Last dose will be early AM 2/6 2/6/2023 at 0800     ezetimibe (ZETIA) 10 MG tablet Take 1 tablet (10 mg) by mouth daily  2/6/2023     furosemide (LASIX) 20 MG tablet Take 1 tablet (20 mg) by mouth daily  2/6/2023 at AM     spironolactone (ALDACTONE) 25 MG tablet Take 1 tablet (25 mg) by mouth daily  2/6/2023 at AM     VENTOLIN HFA 90 mcg/actuation inhaler Take 2 puffs by mouth every 4 hours as needed for shortness of breath or wheezing  Unknown at has with     warfarin ANTICOAGULANT (COUMADIN/JANTOVEN) 3 MG tablet [WARFARIN ANTICOAGULANT (COUMADIN/JANTOVEN) 3 MG TABLET] Take 1 tablets (3 mg) by mouth daily. Adjust dose based on INR results as directed.  1/31/2023       Information source(s): Patient and Clinic records    Method of interview communication: in-person    Patient was asked about OTC/herbal products specifically.  PTA med list reflects this.    Based on the pharmacist's assessment, the PTA med list information appears reliable    Medication Affordability:  Not including over the counter (OTC) medications, was there a time in the past 12 months when you did not take your medications  as prescribed because of cost?: No    Allergies were reviewed, assessed, and updated with the patient.      Medications available for use during hospital stay: Ventolin, Asanex.      Thank you for the opportunity to participate in the care of this patient.      Babatunde Pascal [facility]     2/7/2023     10:22 AM

## 2023-02-07 NOTE — TREATMENT PLAN
Orthopedic Surgery Pre-Op Plan: Matthew W Behr  pre-op review. This is NOT an H&P   Surgeon: Dr. Virk   [facility]: [facility]  Name of Surgery: Right Total Hip Arthroplasty   Date of Surgery: 2/7/23  H&P: Completed on 1/26/23 by Dr. Ela Ferreira at [facility].   History of ASA, NSAIDS, vitamin and/or herbal supplements within 10 days: No  History of blood thinners: Yes- on chronic anticoagulation with warfarin for history CVA in 2021.  Patient instructed to hold warfarin for 5 days before surgery (take last dose on 2/1/23, then hold). Will bridge with therapeutic dose Lovenox 95 mg every 12 hrs while holding warfarin for surgery. Patient instructed to take last dose of Lovenox on morning of 2/6/23, then hold it for 24 hrs before surgery.     Plan:   1) Discharge Plan: Home morning of POD 1 with assist of his son and wife. Please see Discharge Planning section near bottom of this note for further details.     2) History of CVA: in 2021: [place] to be likely embolic stroke. On chronic anticoagulation with warfarin. Will hold warfarin 5 days before surgery (take last dose on 2/1/23, then hold). Will bridge with therapeutic dose Lovenox 95 mg every 12 hrs while holding warfarin for surgery. Patient instructed to take last dose of Lovenox on morning of 2/6/23, then hold it for 24 hrs before surgery. After surgery, if ok with Dr. Virk, PCP recommends that patient resumes warfarin the evening of surgery and Lovenox 24 hrs after surgery until INR is back therapeutic on warfarin.    3) Heart Failure with reduced ejection fraction, Non-Ischemic Cardiomyopathy: Suffered CVA in March 2021 after patient discontinued his cardiovascular medications and ejection fraction fell to 17%. Reviewed last Cardiology visit notes from [facility], from 11/8/22: doing well from cardiovascular standpoint. Denies any chest pain, shortness of breath, orthopnea, palpitations, or dizziness. Most recent  Echocardiogram below on 1/9/23 showed normal systolic function without any significant valvular abnormalities.     Interpretation Summary     1. The left ventricle is normal in size. Left ventricular function is  normal.The ejection fraction is 55-60%. Left ventricular diastolic function is  normal. No regional wall motion abnormalities noted.  2. Normal right ventricle size and systolic function.  3. Normal left atrial size.  4. No hemodynamically significant valvular abnormalities on 2D or color flow  imaging.     Felt to be stable from cardiovascular standpoint to proceed with right hip surgery. On carvediolol, enalapril, furosemide and spironolactone. Will follow up with Cardiology in 6 months.  I recommend close monitoring of perioperative fluid status.     4) Hyperlipidemia: On atorvastatin and Zetia.     5) Hypertension: Appears well controlled on carvedilol, enalapril, furosemide, and spironolactone.  Patient instructed to hold enalapril on the day of surgery but to continue taking carvedilol, furosemide and spironolactone as recommended by PCP.    6) Asthma: Stable without any recent exacerbations.  On Asmanex inhaler.    Patient appears medically optimized for upcoming surgery. I would recommend Hospitalist Consult to assist with medical management. Please call me below with any questions on this patient.       Review of Systems Notable for: History of CVA in 2021, heart failure with reduced ejection fraction, non-ischemic cardiomyopathy-ejection fraction normalized on most recent echo 1/20/2023, hyperlipidemia, hypertension, asthma.    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    patient denies     Arthritis      Asthma      Cellulitis     Left hand     Cerebral artery occlusion with cerebral infarction (H) 2021     Chest pain with normal coronary angiography 01/03/2018     Congestive heart failure (H)       Hypertension      Nonischemic cardiomyopathy (H) 01/10/2018     Janel Parkinson White pattern seen on electrocardiogram      Past Surgical History:   Procedure Laterality Date     OTHER SURGICAL HISTORY  2003    [facility]       Current Medications:  Patient's Medications   New Prescriptions    No medications on file   Previous Medications    ASMANEX  MCG/ACTUATION HFAA    Inhale 2 puffs into the lungs 2 times daily    ATORVASTATIN (LIPITOR) 40 MG TABLET    Take 1 tablet (40 mg) by mouth daily    CARVEDILOL (COREG) 12.5 MG TABLET    Take 1.5 tablets (18.75 mg) by mouth 2 times daily (with meals)    ENALAPRIL (VASOTEC) 10 MG TABLET    Take 1 tablet (10 mg) by mouth daily    ENOXAPARIN ANTICOAGULANT (LOVENOX) 100 MG/ML SYRINGE    Inject 95 mg Subcutaneous every 12 hours Bridging before surgery when warfarin stopped    EZETIMIBE (ZETIA) 10 MG TABLET    Take 1 tablet (10 mg) by mouth daily    FUROSEMIDE (LASIX) 20 MG TABLET    Take 1 tablet (20 mg) by mouth daily    SPIRONOLACTONE (ALDACTONE) 25 MG TABLET    Take 1 tablet (25 mg) by mouth daily    VENTOLIN HFA 90 MCG/ACTUATION INHALER    [VENTOLIN HFA 90 MCG/ACTUATION INHALER] Take 2 puffs by mouth every 4 (four) hours as needed.           WARFARIN ANTICOAGULANT (COUMADIN/JANTOVEN) 3 MG TABLET    [WARFARIN ANTICOAGULANT (COUMADIN/JANTOVEN) 3 MG TABLET] Take 1 tablets (3 mg) by mouth daily. Adjust dose based on INR results as directed.   Modified Medications    No medications on file   Discontinued Medications    ENOXAPARIN ANTICOAGULANT (LOVENOX) 40 MG/0.4ML SYRINGE    Inject 40 mg Subcutaneous daily Bridging before surgery    SERTRALINE (ZOLOFT) 50 MG TABLET    [SERTRALINE (ZOLOFT) 50 MG TABLET] Take 50 mg by mouth daily.       ALLERGIES:  No Known Allergies    Social History  Social History     Tobacco Use     Smoking status: Never     Smokeless tobacco: Former     Types: Chew   Substance Use  Topics     Alcohol use: Yes     Comment: Alcoholic Drinks/day: 2 drinks per month     Drug use: No       Any Abnormal Recent Diagnostics? Yes  INR 1.5 on 1/30/2023: On chronic anticoagulation with warfarin.  Holding warfarin for 5 days before surgery and bridging with Lovenox.  We will recheck INR in preop on day of surgery.    Discharge Planning:   Planning to discharge home on POD 1 in the morning with his son picking him up from the hospital. His son and wife will be helping after surgery.        01/31/23 1056   Discharge Planning   Patient/Family Anticipates Transition to home with family   Concerns to be Addressed all concerns addressed in this encounter   Living Arrangements   People in Home child(bob), adult;spouse   Type of Residence Private Residence   Is your private residence a single family home or apartment? Single family home   Number of Stairs, Within Home, Primary none   Once home, are you able to live on one level? Yes   Which level? Main Level   Bathroom Shower/Tub Tub/Shower unit   Equipment Currently Used at Home grab bar, tub/shower   Support System   Support Systems Spouse/Significant Other;Children  (son, Gurinder, and wife, Margie)   Do you have someone available to stay with you one or two nights once you are home? Yes   Education   Patient attended total joint pre-op class/received pre-op teaching  email/phone call       RANI Arreguin, CNP   Advanced Practice Nurse Navigator- Orthopedics  [facility]   Phone: [phone]

## 2023-02-07 NOTE — ANESTHESIA CARE TRANSFER NOTE
Patient: Matthew W Behr    Procedure: Procedure(s):  RIGHT TOTAL HIP ARTHROPLASTY       Diagnosis: Osteoarthritis of right hip [M16.11]  Diagnosis Additional Information: No value filed.    Anesthesia Type:   Spinal     Note:    Oropharynx: oropharynx clear of all foreign objects  Level of Consciousness: drowsy  Oxygen Supplementation: room air    Independent Airway: airway patency satisfactory and stable  Dentition: dentition unchanged  Vital Signs Stable: post-procedure vital signs reviewed and stable  Report to RN Given: handoff report given  Patient transferred to: PACU    Handoff Report: Identifed the Patient, Identified the Reponsible Provider, Reviewed the pertinent medical history, Discussed the surgical course, Reviewed Intra-OP anesthesia mangement and issues during anesthesia, Set expectations for post-procedure period and Allowed opportunity for questions and acknowledgement of understanding      Vitals:  Vitals Value Taken Time   /63 02/07/23 1420   Temp 97.8f 02/07/23 1420   Pulse 68 02/07/23 1420   Resp 17 02/07/23 1420   SpO2 95 % 02/07/23 1420   Vitals shown include unvalidated device data.    Electronically Signed By: RANI NAPOLES CRNA  February 7, 2023  2:22 PM

## 2023-02-07 NOTE — ANESTHESIA PREPROCEDURE EVALUATION
Anesthesia Pre-Procedure Evaluation    Patient: Matthew W Behr   MRN: [id number] : 1965        Procedure : Procedure(s):  RIGHT TOTAL HIP ARTHROPLASTY          Past Medical History:   Diagnosis Date     Acute respiratory failure with hypoxia (H)      Amphetamine misuse 2017    patient denies     Arthritis      Asthma      Cellulitis     Left hand     Cerebral artery occlusion with cerebral infarction (H)      Chest pain with normal coronary angiography 2018     Congestive heart failure (H)      Hypertension      Nonischemic cardiomyopathy (H) 01/10/2018     Janel Parkinson White pattern seen on electrocardiogram       Past Surgical History:   Procedure Laterality Date     OTHER SURGICAL HISTORY      OTHER SURGICAL [facility]ORYCard[facility]      No Known Allergies   Social History     Tobacco Use     Smoking status: Never     Smokeless tobacco: Former     Types: Chew   Substance Use Topics     Alcohol use: Yes     Comment: Alcoholic Drinks/day: 2 drinks per month      Wt Readings from Last 1 Encounters:   23 96.8 kg (213 lb 8 oz)        Anesthesia Evaluation   Pt has had prior anesthetic.     No history of anesthetic complications       ROS/MED HX  ENT/Pulmonary:     (+) asthma     Neurologic:     (+) CVA, date: ,     Cardiovascular:     (+) Dyslipidemia hypertension-----Taking blood thinners (last lovenox yesterday AM) CHF (EF 55-60%)     METS/Exercise Tolerance:     Hematologic:       Musculoskeletal:       GI/Hepatic:  - neg GI/hepatic ROS     Renal/Genitourinary:  - neg Renal ROS     Endo:  - neg endo ROS     Psychiatric/Substance Use:  - neg psychiatric ROS     Infectious Disease:  - neg infectious disease ROS     Malignancy:       Other:            Physical Exam    Airway        Mallampati: II   TM distance: > 3 FB   Neck ROM: full   Mouth opening: > 3 cm    Respiratory Devices and Support         Dental     Comment: Front teeth worn  down/chipped        Cardiovascular          Rhythm and rate: regular and normal     Pulmonary           breath sounds clear to auscultation           OUTSIDE LABS:  CBC:   Lab Results   Component Value Date    WBC 10.1 03/20/2021    WBC 10.0 03/19/2021    HGB 16.4 03/20/2021    HGB 17.2 03/19/2021    HCT 47.8 03/20/2021    HCT 50.6 03/19/2021     03/20/2021     03/19/2021     BMP:   Lab Results   Component Value Date     11/30/2022     09/01/2022    POTASSIUM 3.7 11/30/2022    POTASSIUM 4.3 11/08/2022    CHLORIDE 100 11/30/2022    CHLORIDE 107 09/01/2022    CO2 26 11/30/2022    CO2 26 09/01/2022    BUN 29.1 (H) 11/30/2022    BUN 22 09/01/2022    CR 1.03 11/30/2022    CR 0.77 09/01/2022     (H) 11/30/2022     09/01/2022     COAGS:   Lab Results   Component Value Date    INR 0.98 02/07/2023     POC: No results found for: BGM, HCG, HCGS  HEPATIC:   Lab Results   Component Value Date    ALBUMIN 4.0 11/30/2022    PROTTOTAL 6.6 11/30/2022    ALT 43 11/30/2022    AST 50 11/30/2022    ALKPHOS 88 11/30/2022    BILITOTAL 1.4 (H) 11/30/2022     OTHER:   Lab Results   Component Value Date    ELLIS 8.5 (L) 11/30/2022    MAG 1.9 11/08/2022    TSH 1.96 04/22/2019       Anesthesia Plan    ASA Status:  3      Anesthesia Type: Spinal.              Consents    Anesthesia Plan(s) and associated risks, benefits, and realistic alternatives discussed. Questions answered and patient/representative(s) expressed understanding.     - Discussed: Risks, Benefits and Alternatives for BOTH SEDATION and the PROCEDURE were discussed     - Discussed with:  Patient         Postoperative Care            Comments:                Kesha Marin MD

## 2023-02-07 NOTE — ANESTHESIA POSTPROCEDURE EVALUATION
Patient: Matthew W Behr    Procedure: Procedure(s):  RIGHT TOTAL HIP ARTHROPLASTY       Anesthesia Type:  Spinal    Note:  Disposition: Inpatient   Postop Pain Control: Uneventful            Sign Out: Well controlled pain   PONV: No   Neuro/Psych: Uneventful            Sign Out: Acceptable/Baseline neuro status   Airway/Respiratory: Uneventful            Sign Out: Acceptable/Baseline resp. status   CV/Hemodynamics: Uneventful            Sign Out: Acceptable CV status; No obvious hypovolemia; No obvious fluid overload   Other NRE: NONE   DID A NON-ROUTINE EVENT OCCUR? No           Last vitals:  Vitals Value Taken Time   /70 02/07/23 1442   Temp 36.2  C (97.1  F) 02/07/23 1442   Pulse 56 02/07/23 1443   Resp 20 02/07/23 1443   SpO2 95 % 02/07/23 1443   Vitals shown include unvalidated device data.    Electronically Signed By: Kesha Marin MD  February 7, 2023  2:55 PM

## 2023-02-07 NOTE — ANESTHESIA PROCEDURE NOTES
"Intrathecal injection Procedure Note    Pre-Procedure   Staff -        Anesthesiologist:  Kary Red MD       Performed By: anesthesiologist       Location: OR       Procedure Start/Stop Times: 2/7/2023 12:35 PM and 2/7/2023 12:37 PM       Pre-Anesthestic Checklist: patient identified, IV checked, risks and benefits discussed, informed consent, monitors and equipment checked, pre-op evaluation, at physician/surgeon's request and post-op pain management  Timeout:       Correct Patient: Yes        Correct Procedure: Yes        Correct Site: Yes        Correct Position: Yes   Procedure Documentation  Procedure: intrathecal injection       Patient Position: sitting       Patient Prep/Sterile Barriers: x2       Skin prep: Chloraprep       Insertion Site: L3-4. (midline approach).       Needle Gauge: 25.        Needle Length (Inches): 3.5        Spinal Needle Type: Pencan       Introducer used       Introducer: 20 G       # of attempts: 1 and  # of redirects:  0    Assessment/Narrative         Paresthesias: No.       CSF fluid: clear.       Opening pressure was cmH2O while  Sitting.      Medication(s) Administered   0.75% Hyperbaric Bupivacaine (Intrathecal) - Intrathecal   1.8 mL - 2/7/2023 12:35:00 PM  Medication Administration Time: 2/7/2023 12:35 PM     Comments:  No pain with injection.       FOR [facility] ([facility] ONLY:   Pain Team Contact information: please page the Pain Team Via [employer]. Search \"Pain\". During daytime hours, please page the attending first. At night please page the resident first.    " Recommendation Faxed to Case Management no

## 2023-02-07 NOTE — CONSULTS
"[facility]  Consult Note - Hospitalist Service  Date of Admission:  2/7/2023  Consult Requested by: Orthopedic surgery  Reason for Consult: Medical management of chronic conditions    Assessment & Plan   Matthew W Behr is a 57 year old male admitted on 2/7/2023.  He has a past medical history of nonischemic cardiomyopathy with recovered EF, embolic right cerebral artery stroke around 2 years ago, mild intermittent asthma who presented to the hospital on 2/7/2023 for an elective right total hip arthroplasty.  Hospitalist medicine consulted for medical management of chronic condition.      #Nonischemic cardiomyopathy with recovered EF  #Hypertension  -Per patient he was diagnosed with nonischemic cardiomyopathy around 2 years ago.  -At home he is on Coreg 12.5 mg twice daily, enalapril 10 mg daily, Lasix 20 mg daily and spironolactone 25 mg daily.  -Appears euvolemic on physical examination.  -Already received his Coreg today.  He did not take his other medications  -Blood pressures currently around 120/70.  Heart rate around 50.    Plan:  [] Check CBC and BMP  [] Restart spironolactone enalapril and Lasix tomorrow.    #History of CVA possibly embolic  -Had a right cerebral stroke around 2 years ago.  At home on warfarin 3 mg daily.    Plan:  [] Currently on Lovenox therapeutic which was started by orthopedic surgery.  [] Transition back to warfarin tomorrow if orthopedic surgery is agreeable.      #History of mild intermittent asthma.  -No recent attacks.    Plan:  [] Continue home inhalers.         Clinically Significant Risk Factors Present on Admission               # Drug Induced Coagulation Defect: home medication list includes an anticoagulant medication    # Hypertension: home medication list includes antihypertensive(s)      # Obesity: Estimated body mass index is 31.53 kg/m  as calculated from the following:    Height as of this encounter: 1.753 m (5' 9\").    Weight as of this " encounter: 96.8 kg (213 lb 8 oz).           RAMONA MCWILLIAMS MD  Hospitalist Service  Securely message with [employer] (more info)  Text page via [employer] Paging/Directory   ______________________________________________________________________    Chief Complaint   Patient is feeling well.  He denies any significant right hip pain.  He denies any chest pain or shortness of breath.        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    patient denies     Arthritis      Asthma      Cellulitis     Left hand     Cerebral artery occlusion with cerebral infarction (H) 2021     Chest pain with normal coronary angiography 01/03/2018     Congestive heart failure (H)      Hypertension      Nonischemic cardiomyopathy (H) 01/10/2018     Janel Parkinson White pattern seen on electrocardiogram        Past Surgical History   Past Surgical History:   Procedure Laterality Date     OTHER SURGICAL HISTORY  2003    OTHER SURGICAL H[facility]ORYCard[facility]       Medications   I have reviewed this patient's current medications       Physical Exam   Vital Signs: Temp: 97.1  F (36.2  C) Temp src: Oral BP: 112/60 Pulse: (!) 48   Resp: 18 SpO2: 97 % O2 Device: None (Room air)    Weight: 213 lbs 8 oz    General: Laying comfortably in bed in no acute distress  Heart: Distant heart.  Regular rhythm.  No peripheral edema.  Lungs: Clear lungs bilaterally without any crackles or wheezing.  Psych: Awake, cooperative.    Medical Decision Making       35 MINUTES SPENT BY ME on the date of service doing chart review, history, exam, documentation & further activities per the note.      Data   Imaging results reviewed over the past 24 hrs:   Recent Results (from the past 24 hour(s))   XR Pelvis Port 1/2 Views    Narrative    EXAM: XR PELVIS PORT 1/2 VIEWS  LOCATION: [facility]  DATE/TIME: 2/7/2023 2:57  PM    INDICATION: Status post Hip surgery  COMPARISON: None.      Impression    IMPRESSION: Postop changes of a recent right total hip arthroplasty. No fracture. Mild degenerative changes in the left hip.     Recent Labs   Lab 02/07/23  1018   INR 0.98

## 2023-02-08 ENCOUNTER — APPOINTMENT (OUTPATIENT)
Dept: PHYSICAL THERAPY | Facility: CLINIC | Age: 58
End: 2023-02-08
Attending: ORTHOPAEDIC SURGERY
Payer: COMMERCIAL

## 2023-02-08 ENCOUNTER — APPOINTMENT (OUTPATIENT)
Dept: OCCUPATIONAL THERAPY | Facility: CLINIC | Age: 58
End: 2023-02-08
Attending: ORTHOPAEDIC SURGERY
Payer: COMMERCIAL

## 2023-02-08 VITALS
HEIGHT: 69 IN | DIASTOLIC BLOOD PRESSURE: 65 MMHG | BODY MASS INDEX: 31.62 KG/M2 | WEIGHT: 213.5 LBS | TEMPERATURE: 98.4 F | RESPIRATION RATE: 16 BRPM | SYSTOLIC BLOOD PRESSURE: 125 MMHG | OXYGEN SATURATION: 93 % | HEART RATE: 84 BPM

## 2023-02-08 LAB
FASTING STATUS PATIENT QL REPORTED: YES
GLUCOSE BLD-MCNC: 146 MG/DL (ref 70–125)
HGB BLD-MCNC: 12.9 G/DL (ref 13.3–17.7)
PLATELET # BLD AUTO: 258 10E3/UL (ref 150–450)

## 2023-02-08 PROCEDURE — 97535 SELF CARE MNGMENT TRAINING: CPT | Mod: GO

## 2023-02-08 PROCEDURE — 250N000013 HC RX MED GY IP 250 OP 250 PS 637: Performed by: STUDENT IN AN ORGANIZED HEALTH CARE EDUCATION/TRAINING PROGRAM

## 2023-02-08 PROCEDURE — 97166 OT EVAL MOD COMPLEX 45 MIN: CPT | Mod: GO

## 2023-02-08 PROCEDURE — 36415 COLL VENOUS BLD VENIPUNCTURE: CPT | Performed by: ORTHOPAEDIC SURGERY

## 2023-02-08 PROCEDURE — 85018 HEMOGLOBIN: CPT | Performed by: ORTHOPAEDIC SURGERY

## 2023-02-08 PROCEDURE — 96372 THER/PROPH/DIAG INJ SC/IM: CPT | Performed by: ORTHOPAEDIC SURGERY

## 2023-02-08 PROCEDURE — 250N000013 HC RX MED GY IP 250 OP 250 PS 637: Performed by: ORTHOPAEDIC SURGERY

## 2023-02-08 PROCEDURE — 97116 GAIT TRAINING THERAPY: CPT | Mod: GP

## 2023-02-08 PROCEDURE — 85049 AUTOMATED PLATELET COUNT: CPT | Performed by: ORTHOPAEDIC SURGERY

## 2023-02-08 PROCEDURE — 97110 THERAPEUTIC EXERCISES: CPT | Mod: GP

## 2023-02-08 PROCEDURE — 250N000011 HC RX IP 250 OP 636: Performed by: ORTHOPAEDIC SURGERY

## 2023-02-08 PROCEDURE — 99231 SBSQ HOSP IP/OBS SF/LOW 25: CPT | Performed by: STUDENT IN AN ORGANIZED HEALTH CARE EDUCATION/TRAINING PROGRAM

## 2023-02-08 PROCEDURE — 82947 ASSAY GLUCOSE BLOOD QUANT: CPT | Performed by: ORTHOPAEDIC SURGERY

## 2023-02-08 PROCEDURE — 97161 PT EVAL LOW COMPLEX 20 MIN: CPT | Mod: GP

## 2023-02-08 RX ORDER — ENALAPRIL MALEATE 5 MG/1
10 TABLET ORAL DAILY
Status: DISCONTINUED | OUTPATIENT
Start: 2023-02-08 | End: 2023-02-08 | Stop reason: HOSPADM

## 2023-02-08 RX ORDER — SPIRONOLACTONE 25 MG/1
25 TABLET ORAL DAILY
Status: DISCONTINUED | OUTPATIENT
Start: 2023-02-08 | End: 2023-02-08 | Stop reason: HOSPADM

## 2023-02-08 RX ORDER — CARVEDILOL 6.25 MG/1
18.75 TABLET ORAL 2 TIMES DAILY WITH MEALS
Status: DISCONTINUED | OUTPATIENT
Start: 2023-02-08 | End: 2023-02-08 | Stop reason: HOSPADM

## 2023-02-08 RX ORDER — FUROSEMIDE 20 MG
20 TABLET ORAL DAILY
Status: DISCONTINUED | OUTPATIENT
Start: 2023-02-08 | End: 2023-02-08 | Stop reason: HOSPADM

## 2023-02-08 RX ADMIN — ENALAPRIL MALEATE 10 MG: 5 TABLET ORAL at 10:11

## 2023-02-08 RX ADMIN — CARVEDILOL 18.75 MG: 6.25 TABLET, FILM COATED ORAL at 10:17

## 2023-02-08 RX ADMIN — SENNOSIDES AND DOCUSATE SODIUM 1 TABLET: 50; 8.6 TABLET ORAL at 08:22

## 2023-02-08 RX ADMIN — ACETAMINOPHEN 975 MG: 325 TABLET ORAL at 03:47

## 2023-02-08 RX ADMIN — ENOXAPARIN SODIUM 40 MG: 100 INJECTION SUBCUTANEOUS at 05:36

## 2023-02-08 RX ADMIN — OXYCODONE HYDROCHLORIDE 5 MG: 5 TABLET ORAL at 00:43

## 2023-02-08 RX ADMIN — EZETIMIBE 10 MG: 10 TABLET ORAL at 08:22

## 2023-02-08 RX ADMIN — HYDROXYZINE HYDROCHLORIDE 25 MG: 25 TABLET, FILM COATED ORAL at 03:49

## 2023-02-08 RX ADMIN — POLYETHYLENE GLYCOL 3350 17 G: 17 POWDER, FOR SOLUTION ORAL at 08:22

## 2023-02-08 RX ADMIN — OXYCODONE HYDROCHLORIDE 10 MG: 5 TABLET ORAL at 05:35

## 2023-02-08 RX ADMIN — CEFAZOLIN SODIUM 2 G: 2 INJECTION, SOLUTION INTRAVENOUS at 05:38

## 2023-02-08 RX ADMIN — FUROSEMIDE 20 MG: 20 TABLET ORAL at 10:11

## 2023-02-08 RX ADMIN — SPIRONOLACTONE 25 MG: 25 TABLET, FILM COATED ORAL at 10:11

## 2023-02-08 RX ADMIN — ACETAMINOPHEN 975 MG: 325 TABLET ORAL at 10:10

## 2023-02-08 ASSESSMENT — ACTIVITIES OF DAILY LIVING (ADL)
ADLS_ACUITY_SCORE: 33
ADLS_ACUITY_SCORE: 35

## 2023-02-08 NOTE — PLAN OF CARE
Occupational Therapy Discharge Summary    Reason for therapy discharge:    All goals and outcomes met, no further needs identified.    Progress towards therapy goal(s). See goals on Care Plan in [facility] electronic health record for goal details.  Goals met    Therapy recommendation(s):    No further therapy is recommended.

## 2023-02-08 NOTE — PROGRESS NOTES
"                  [facility] Orthopaedics Progress Note      Post-operative Day: 1 Day Post-Op    Procedure(s):  RIGHT TOTAL HIP ARTHROPLASTY      Subjective:    Pain: minimal  Chest pain, SOB:  No      Objective:  Blood pressure 116/61, pulse 69, temperature 98.3  F (36.8  C), temperature source Oral, resp. rate 18, height 1.753 m (5' 9\"), weight 96.8 kg (213 lb 8 oz), SpO2 95 %.    Patient Vitals for the past 24 hrs:   BP Temp Temp src Pulse Resp SpO2 Height Weight   02/08/23 0010 116/61 98.3  F (36.8  C) Oral 69 18 95 % -- --   02/07/23 1920 128/73 98.2  F (36.8  C) Oral 68 20 96 % -- --   02/07/23 1720 118/59 97.5  F (36.4  C) Oral 67 -- 97 % -- --   02/07/23 1620 117/61 97.8  F (36.6  C) Oral 52 19 96 % -- --   02/07/23 1545 112/60 97.1  F (36.2  C) Oral (!) 48 18 97 % -- --   02/07/23 1445 (!) 144/68 -- -- 56 20 95 % -- --   02/07/23 1442 116/70 97.1  F (36.2  C) Temporal 61 23 96 % -- --   02/07/23 1430 114/64 -- -- 67 20 96 % -- --   02/07/23 1419 113/65 97.3  F (36.3  C) Temporal 71 26 96 % -- --   02/07/23 1004 (!) 144/75 96.9  F (36.1  C) Temporal 63 16 96 % 1.753 m (5' 9\") 96.8 kg (213 lb 8 oz)       Wt Readings from Last 4 Encounters:   02/07/23 96.8 kg (213 lb 8 oz)   11/08/22 98 kg (216 lb)   09/01/22 94.3 kg (208 lb)   05/04/22 93 kg (205 lb)         Motor function, sensation, and circulation intact   Yes  Wound status: incisions are clean dry and intact. Yes  Calf tenderness: Bilateral  No    Pertinent Labs   Lab Results: personally reviewed.     Recent Labs   Lab Test 02/08/23  0659 02/07/23  1619 02/07/23  1018 11/30/22  0807 11/08/22  0859 09/01/22  0902 01/28/22  0846 01/28/22  0836 03/21/21  0451 03/20/21  0439 03/19/21  0420   INR  --   --  0.98  --  1.9*  --   --  1.4*   < >  --   --    HGB 12.9* 14.0  --   --   --   --   --   --   --  16.4 17.2   HCT  --  42.3  --   --   --   --   --   --   --  47.8 50.6   MCV  --  90  --   --   --   --   --   --   --  88 87    232  --   --   --   -- "   --   --   --  347 388   NA  --  136  --  140  --  140   < >  --    < > 135* 138    < > = values in this interval not displayed.       Plan: Anticoagulation protocol: Lovenox inpatient then resume home Warfarin upon discharge            Pain medications:  scopainmedication: oxycodone and tylenol            Weight bearing status:  WBAT            Disposition:  Home today with OPPT             Continue cares and rehabilitation     Report completed by:  Sekou Valero PA-C  Date: 2/8/2023  Time: 7:48 AM

## 2023-02-08 NOTE — PROGRESS NOTES
[facility]  OUTPATIENT PHYSICAL THERAPY EVALUATION  PLAN OF TREATMENT FOR OUTPATIENT REHABILITATION  (COMPLETE FOR INITIAL CLAIMS ONLY)  Patient's Last Name, First Name, M.I.  YOB: 1965  Behr,Matthew W                        Provider's Name  [facility] Medical Record No.  [id number]                             Onset Date:  02/07/23   Start of Care Date:  (P) 02/08/23   Type:     _X_PT   ___OT   ___SLP Medical Diagnosis:  (P) s/p R MARIA T              PT Diagnosis:  (P) Impaired functional mobility Visits from SOC:  1     See note for plan of treatment, functional goals and certification details    I CERTIFY THE NEED FOR THESE SERVICES FURNISHED UNDER        THIS PLAN OF TREATMENT AND WHILE UNDER MY CARE     (Physician co-signature of this document indicates review and certification of the therapy plan).                02/08/23 0900   Appointment Info   Signing Clinician's Name / Credentials (PT) Katelyn Sutton, PT, DPT   Quick Adds   Quick Adds Certification   Living Environment   People in Home spouse;child(bob), adult   Current Living Arrangements house   Home Accessibility no concerns   Transportation Anticipated family or friend will provide   Living Environment Comments Patient lives in a house with his wife and son. Patient is able to stay on the main level of the house.   Self-Care   Usual Activity Tolerance good   Current Activity Tolerance good   Equipment Currently Used at Home none   Fall history within last six months no   Activity/Exercise/Self-Care Comment Patient is indpendent with all mobility at baseline. Patient has access to a cane and walker to use at discharge.   General Information   Onset of Illness/Injury or Date of Surgery 02/07/23   Referring Physician Agustín Virk MD   Patient/Family Therapy Goals Statement (PT) Return home   Pertinent History of Current Problem (include personal factors and/or comorbidities  that impact the POC) s/p R MARIA T   Existing Precautions/Restrictions no hip IR;no hip ADD past midline;90 degree hip flexion   Weight-Bearing Status - LLE full weight-bearing   Weight-Bearing Status - RLE weight-bearing as tolerated   Posture    Posture Not impaired   Range of Motion (ROM)   Range of Motion ROM deficits secondary to surgical procedure   Strength (Manual Muscle Testing)   Strength (Manual Muscle Testing) No deficits observed during functional mobility   Bed Mobility   Bed Mobility supine-sit;sit-supine   Supine-Sit [place] (Bed Mobility) supervision;verbal cues   Sit-Supine [place] (Bed Mobility) supervision;verbal cues   Impairments Contributing to Impaired Bed Mobility pain;decreased ROM;decreased strength   Transfers   Transfers sit-stand transfer   Maintains Weight-bearing Status (Transfers) able to maintain   Impairments Contributing to Impaired Transfers pain;decreased ROM;decreased strength   Sit-Stand Transfer   Sit-Stand [place] (Transfers) supervision;verbal cues   Assistive Device (Sit-Stand Transfers) walker, front-wheeled   Gait/Stairs (Locomotion)   [place] Level (Gait) supervision;verbal cues   Assistive Device (Gait) walker, front-wheeled   Distance in Feet (Gait) 300'   Pattern (Gait) step-through   Deviations/Abnormal Patterns (Gait) antalgic;gait speed decreased;stride length decreased;weight shifting decreased   Maintains Weight-bearing Status (Gait) able to maintain   Clinical Impression   Criteria for Skilled Therapeutic Intervention Yes, treatment indicated   PT Diagnosis (PT) Impaired functional mobility   Influenced by the following impairments Pain, decreased ROM, decreased strength   Functional limitations due to impairments Bed mobility, transfers, gait   Clinical Presentation (PT Evaluation Complexity) Stable/Uncomplicated   Clinical Presentation Rationale Patient presents as medically diagnosed.   Clinical Decision Making (Complexity) low complexity    Planned Therapy Interventions (PT) bed mobility training;gait training;home exercise program;patient/family education;ROM (range of motion);strengthening;stretching;transfer training;home program guidelines   Risk & Benefits of therapy have been explained evaluation/treatment results reviewed;care plan/treatment goals reviewed;patient   PT Total Evaluation Time   PT Eval, Low Complexity Minutes (42324) 10   Plan of Care Review   Plan of Care Reviewed With patient   Therapy Certification   Start of care date 02/08/23   Certification date from 02/08/23   Certification date to 02/15/23   Medical Diagnosis s/p R MARIA T   Physical Therapy Goals   PT Frequency One time eval and treatment only   PT Predicted Duration/Target Date for Goal Attainment 02/08/23   PT Goals Bed Mobility;Transfers;Gait   PT: Bed Mobility Independent;Supine to/from sit;Within precautions;Goal Met   PT: Transfers Modified independent;Sit to/from stand;Assistive device;Within precautions;Goal Met   PT: Gait Modified independent;Assistive device;Within precautions;Greater than 200 feet;Goal Met   Interventions   Interventions Quick Adds Gait Training;Therapeutic Activity;Therapeutic Procedure   Therapeutic Procedure/Exercise   Ther. Procedure: strength, endurance, ROM, flexibillity Minutes (92867) 15   Symptoms Noted During/After Treatment increased pain   Treatment Detail/Skilled Intervention Patient completed MARIA T HEP x 10 reps each. Supervision and verbal cues for exercise technique.   Therapeutic Activity   Therapeutic Activities: dynamic activities to improve functional performance Minutes (10338) 5   Symptoms Noted During/After Treatment None   Treatment Detail/Skilled Intervention Supine<>sit with SBA. Verbal cues for hip precautions. IND following cueing. Sit<>stand with SBA and FWW. Verbal cues for hand placement. Mod I following cueing.   Gait Training   Gait Training Minutes (20152) 10   Symptoms Noted During/After Treatment (Gait Training)  increased pain   Treatment Detail/Skilled Intervention Patient ambulated 300' in hallways with SBA and FWW. Verbal cues for decreased hip hiking to facilitate reciprocal gait pattern. Mod I following cueing.   [place] Level (Gait Training) independent   Physical Assistance Level (Gait Training) verbal cues   Weight Bearing (Gait Training) weight-bearing as tolerated   Assistive Device (Gait Training) rolling walker  (FWW)   Pattern Analysis (Gait Training) swing-through gait   Gait Analysis Deviations decreased step length   Impairments (Gait Analysis/Training) pain;ROM decreased;strength decreased   PT Discharge Planning   PT Plan Discharge   PT Discharge Recommendation (DC Rec) (S)  home with assist   PT Rationale for DC Rec Patient is ambulating safely with FWW and will have assist from wife and son.   PT Brief overview of current status Mod I for transfers and gait with FWW.

## 2023-02-08 NOTE — OP NOTE
Procedure Date: 02/07/2023    PREOPERATIVE DIAGNOSIS:  Right hip primary osteoarthritis.    POSTOPERATIVE DIAGNOSIS:  Right hip primary osteoarthritis.    PROCEDURE:  Right MIS total hip arthroplasty.    SURGEON:  Agustín Virk MD    ASSISTANT:  Sekou Valero PA-C (PA was medically indicated and necessary to allow for the safe and adequate progression of surgery).    ESTIMATED BLOOD LOSS:  200 mL.    COMPLICATIONS:  None.    SPECIMENS:  None.    ANESTHETIC:  Spinal.    IMPLANTS USED:  Smith and Nephew size 56 Reflection III 3-hole cup, central hole cover, 2 screws in the superior posterior quadrant, which were 30 and 15 respectively, a central hole cover +4 20 degree cross-linked liner, 40 inner diameter, 56 outer diameter, a size  7 standard Polar stem and a +0, 40 mm Oxinium femoral head.    DESCRIPTION OF PROCEDURE:  The patient was brought to the operating room.  After adequate anesthesia was induced, the patient was placed in the left lateral decubitus position.  The right lower extremity was prepped and draped in the usual sterile fashion.  MIS incision made through the skin and subcutaneous tissue mid glute approach.  [place] retractors placed, took down the short external rotators.  T capsulotomy performed.  Capsule saved for later repair.  I got a baseline length and offset using the Smith and Nephew length offset device and then dislocated the hip.  Per preoperative templating, a femoral neck cut was made about a 12 mm above the lesser trochanter.  Femoral head removed.  Grade 4 chondromalacia noted.  Carefully retracted femur anteriorly, did a labrectomy, foveal ligament excision, large labral tear and broken osteophyte in the pincer region anteriorly.  These were all removed and then medialized with a 48 reamer, circumferentially reamed to a 55 reamer. Good bleeding subchondral bone, so we impacted a 56 cup.  Anteversion was slightly increased 5-10 degrees compared to his transverse acetabular  "ligament.  Coverage angle estimated at 45 degrees.  Two screws placed in the superior posterior quadrant, 30 and 15 respectively. Each had excellent purchase.  Central hole cover placed and locked without difficulty and then placed the elevated lip liner at about the 7:30/8 o'clock position, locked it in place. Irrigated copiously, dried it up carefully and turned attention to the femur.  Cookie cutter, canal finder and then a lateral rasp and \"chili pepper\" rasp were used to lateralize and then we sequentially worked up from the 0 to the 7 Polar stem broach, trialed the 7 standard and 0 was perfect. Length was +3 intraoperative measurement.  Clinically, felt maybe +4 or 5 ,but measured +3. offset was perfect for his baseline offset.  Stability was excellent. Flexed to 90, internally rotated to 75 before the impingement began. Dislocation event occurred right around 90 degrees.  Full extension, external rotation did not impinge until about 90-95 degrees with no anterior dislocation.  So, we removed the trial implant, placed final implant.  Final implant sat line to line.  Tested the 0 again, felt happy with the stability as noted previously.  Flexed to 90, internally rotated to 90 before it dislocated.  Full extension, external rotation did not impinge or dislocate to 90, so we impacted that +0 40 mm head on the trunnion.  It was stable.  We reduced the hip, irrigated the hip, repaired the posterior capsule side-to-side with #1 Vicryl interrupted figure-of-eight. Irrigated again copiously, dried it up carefully, closed the gluteus fascia with #1 Vicryl interrupted figure-of-eight, #1 PDS interrupted horizontal mattress in the IT band, 2-0 Vicryl in layers in the subcutaneous, 3-0 Monocryl for subcuticular closure.  Steri-Strips, sterile bandages were applied.  The patient was awakened and returned to the postanesthesia care unit in excellent condition.  No apparent intraoperative complications.    Plan is routine " total hip protocol.    Agustín Virk MD        D: 2023   T: 2023   MT: catherine    Name:     BEHR, MATTHEW W.  MRN:      [id number]        Account:        [id number]   :      1965           Procedure Date: 2023     Document: [id number]

## 2023-02-08 NOTE — PROGRESS NOTES
02/08/23 0936   Appointment Info   Signing Clinician's Name / Credentials (OT) SHAWN Uriostegui   Living Environment   People in Home spouse;child(bob), adult   Current Living Arrangements house   Transportation Anticipated family or friend will provide   Living Environment Comments Pt has tub shower w/ 1 grab bar, standard toilets   Self-Care   Usual Activity Tolerance good   Current Activity Tolerance good   Equipment Currently Used at Home grab bar, tub/shower   Fall history within last six months no   Activity/Exercise/Self-Care Comment Pt IND w/ ADLs and IADLs at [facility]   General Information   Onset of Illness/Injury or Date of Surgery 02/07/23   Referring Physician Agustín Virk MD   Patient/Family Therapy Goal Statement (OT) to go home today   Additional Occupational Profile Info/Pertinent History of Current Problem MARIA T   Existing Precautions/Restrictions no hip IR;no hip ADD past midline   Left Lower Extremity (Weight-bearing Status) full weight-bearing (FWB)   Right Lower Extremity (Weight-bearing Status) weight-bearing as tolerated (WBAT)   Cognitive Status Examination   Orientation Status orientation to person, place and time   Visual Perception   Visual Impairment/Limitations WNL   Sensory   Sensory Quick Adds sensation intact   Pain Assessment   Patient Currently in Pain Yes, see Vital Sign flowsheet   Posture   Posture not impaired   Range of Motion Comprehensive   General Range of Motion no range of motion deficits identified   Strength Comprehensive (MMT)   General Manual Muscle Testing (MMT) Assessment no strength deficits identified   Muscle Tone Assessment   Muscle Tone Quick Adds No deficits were identified   Coordination   Upper Extremity Coordination No deficits were identified   Bed Mobility   Bed Mobility supine-sit;sit-supine   Comment (Bed Mobility) SBA for bed mobility   Transfers   Transfers bed-chair transfer;toilet transfer;shower transfer   Transfer Comments SBA-CGA for  transfers   Activities of Daily Living   BADL Assessment/Intervention lower body dressing;bathing;toileting   Bathing Assessment/Intervention   [place] Level (Bathing) contact guard assist   Lower Body Dressing Assessment/Training   [place] Level (Lower Body Dressing) minimum assist (75% patient effort)   Toileting   [place] Level (Toileting) supervision   Clinical Impression   Criteria for Skilled Therapeutic Interventions Met (OT) Yes, treatment indicated   OT Diagnosis decreased ADLs   Influenced by the following impairments MARIA T   OT Problem List-Impairments impacting ADL activity tolerance impaired;pain;post-surgical precautions   Assessment of Occupational Performance 3-5 Performance Deficits   Identified Performance Deficits LE dressing, bed mobility, all transfers, bathing   Planned Therapy Interventions (OT) ADL retraining;bed mobility training;transfer training   Clinical Decision Making Complexity (OT) moderate complexity   Risk & Benefits of therapy have been explained evaluation/treatment results reviewed;patient   OT Total Evaluation Time   OT Eval, Moderate Complexity Minutes (07339) 10   OT Goals   Therapy Frequency (OT) One time eval and treatment   OT Predicted Duration/Target Date for Goal Attainment 02/08/23   OT Goals Lower Body Dressing;Bed Mobility;Transfers;Toilet Transfer/Toileting   OT: Lower Body Dressing Modified independent;using adaptive equipment;within precautions;Goal Met;Completed   OT: Bed Mobility Modified independent;supine to/from sitting;rolling;bridging;within precautions;Goal Met;Completed   OT: Transfer Supervision/stand-by assist;with assistive device;within precautions;Goal Met;Completed  (tub shower)   OT: Toilet Transfer/Toileting Modified independent;toilet transfer;cleaning and garment management;within precautions;Goal Met;Completed   Interventions   Interventions Quick Adds Self-Care/Home Management   Self-Care/Home Management   Self-Care/Home Mgmt/ADL,  Compensatory, Meal Prep Minutes (10991) 18   Symptoms Noted During/After Treatment (Meal Preparation/Planning Training) none   Treatment Detail/Skilled Intervention Pt edu on hip px - demonstrated ability to complete ADLs w/in px. Pt edu on compensatory strategies for LE dressing using sock aid - completed Mod I w/ AE. STS w/ SBA and FWW. Pt amb. 15 ft to BR w/ FWW Mod I. Edu on toilet transfer - completed Mod I. Pt amb. 150 ft to ADL suite w/ FWW Mod I. Edu on tub shower transfer w/ grab bar - completed Mod I. Pt instructed on bed mobility techniques - completed Mod I. Pt edu on sleeping position and car transfers - pt verbalized understanding. Pt wanting to order sock aid online for home. Discussed shower chair for home - pt declining.   OT Discharge Planning   OT Plan DC OT   OT Discharge Recommendation (DC Rec) (S)  home with assist   OT Rationale for DC Rec Pt tolerating therapy well. Pt will have assistance from spouse/son w/ ADLs and IADLs as needed. Pt recommended to have sock aid and shower chair - pt will order online if desired.   OT Brief overview of current status Mod I w/ ADLs   Total Session Time   Timed Code Treatment Minutes 18   Total Session Time (sum of timed and untimed services) 28

## 2023-02-08 NOTE — PROGRESS NOTES
[facility]    Medicine Progress Note - Hospitalist Service    Date of Admission:  2/7/2023    Assessment & Plan   Matthew W Behr is a 57 year old male admitted on 2/7/2023.  He has a past medical history of nonischemic cardiomyopathy with recovered EF, embolic right cerebral artery stroke around 2 years ago, mild intermittent asthma who presented to the hospital on 2/7/2023 for an elective right total hip arthroplasty.  Hospitalist medicine consulted for medical management of chronic condition.      #Nonischemic cardiomyopathy with recovered EF  #Hypertension  -Per patient he was diagnosed with nonischemic cardiomyopathy around 2 years ago.  -At home he is on Coreg 12.5 mg twice daily, enalapril 10 mg daily, Lasix 20 mg daily and spironolactone 25 mg daily.  -Appears euvolemic on physical examination.  -Heart rate this morning around 80.  -CBC and BMP largely unremarkable besides some mild anemia.    Plan:  [] Restart Coreg, enalapril, Lasix and spironolactone.  [] Cleared for discharge from internal medicine standpoint.      #History of CVA possibly embolic  -Had a right cerebral stroke around 2 years ago.  At home on warfarin 3 mg daily.  -Was on therapeutic Lovenox perioperatively.    Plan:  [] Restarted on home warfarin by orthopedic surgery.      #History of mild intermittent asthma.  -No recent attacks.    Plan:  [] Continue home inhalers.           Diet: Advance Diet as Tolerated: Regular Diet Adult  Discharge Instruction - Regular Diet Adult    DVT Prophylaxis: Defer to primary service  Emery Catheter: Not present  Lines: None     Cardiac Monitoring: None  Code Status: Full Code        Disposition Plan Home     Expected Discharge Date: 02/08/2023                  RAMONA MCWILLIAMS MD  Hospitalist Service  [facility]  Securely message with [employer] (more info)  Text page via [employer] Paging/Directory    ______________________________________________________________________    Interval History   No significant events.  Patient is feeling well.  He denies any significant right hip pain.  He denies any nausea or vomiting.  He denies any chest pain or shortness of breath.  He denies any lightheadedness.    Physical Exam   Vital Signs: Temp: 98.4  F (36.9  C) Temp src: Oral BP: 125/65 Pulse: 84   Resp: 16 SpO2: 93 % O2 Device: None (Room air)    Weight: 213 lbs 8 oz    Physical Exam  Constitutional:       General: He is not in acute distress.     Appearance: He is not ill-appearing.   Cardiovascular:      Rate and Rhythm: Normal rate.   Pulmonary:      Effort: Pulmonary effort is normal. No respiratory distress.      Breath sounds: Normal breath sounds. No wheezing.   Skin:     General: Skin is warm and dry.   Neurological:      General: No focal deficit present.      Mental Status: He is alert.   Psychiatric:         Mood and Affect: Mood normal.         Behavior: Behavior normal.         Medical Decision Making       30 MINUTES SPENT BY ME on the date of service doing chart review, history, exam, documentation & further activities per the note.      Data     I have personally reviewed the following data over the past 24 hrs:    11.8 (H)  \   12.9 (L)   / 258     136 104 24 (H) /  146 (H)   4.2 25 0.86 \       INR:  0.98 PTT:  N/A   D-dimer:  N/A Fibrinogen:  N/A       Imaging results reviewed over the past 24 hrs:   Recent Results (from the past 24 hour(s))   XR Pelvis Port 1/2 Views    Narrative    EXAM: XR PELVIS PORT 1/2 VIEWS  LOCATION: [facility]  DATE/TIME: 2/7/2023 2:57 PM    INDICATION: Status post Hip surgery  COMPARISON: None.      Impression    IMPRESSION: Postop changes of a recent right total hip arthroplasty. No fracture. Mild degenerative changes in the left hip.

## 2023-02-08 NOTE — PLAN OF CARE
Patient vital signs are at baseline: No,  Reason:  bradycardic   Patient able to ambulate as they were prior to admission or with assist devices provided by therapies during their stay:  No, slowly gaining sensation back to LE's, not yet OOB   Patient MUST void prior to discharge:  No,  Reason:  due to void.   Patient able to tolerate oral intake:  Yes  Pain has adequate pain control using Oral analgesics:  Yes  Does patient have an identified :  Yes  Has goal D/C date and time been discussed with patient:  No,  Reason:  pending PT/OT/Ortho     Problem: Plan of Care - These are the overarching goals to be used throughout the patient stay.    Goal: Optimal Comfort and Wellbeing  Outcome: Progressing

## 2023-02-08 NOTE — PROGRESS NOTES
Patient vital signs are at baseline: Yes  Patient able to ambulate as they were prior to admission or with assist devices provided by therapies during their stay:  Yes  Patient MUST void prior to discharge:  Yes  Patient able to tolerate oral intake:  Yes  Pain has adequate pain control using Oral analgesics:  Yes  Does patient have an identified :  Yes  Has goal D/C date and time been discussed with patient:  Yes       A&Ox4. Able to make needs known. PRN oxycodone given for pain with good relief. Ambulating SBA. Voiding without difficulty.

## 2023-02-08 NOTE — PROGRESS NOTES
Care Management Follow Up    Length of Stay (days): 0    Expected Discharge Date: 02/08/2023     Concerns to be Addressed: no discharge needs identified     Patient plan of care discussed at interdisciplinary rounds: Yes    Anticipated Discharge Disposition: Outpatient Rehab (PT, OT, SLP, Cardiac or Pulmonary)     Anticipated Discharge Services: None  Anticipated Discharge DME: None    Patient/family educated on Medicare website which has current facility and service quality ratings: No  Education Provided on the Discharge Plan: No  Patient/Family in Agreement with the Plan: yes    Referrals Placed by CM/SW:    Private pay costs discussed: Not applicable    Additional Information:  Chart Reviewed: Pt lives home with spouse and adult children in house. No CM services identified at this time. Family to transport.       Anamaria Zhou

## 2023-02-08 NOTE — PLAN OF CARE
Patient ready for discharge.  Discharge instructions discussed and questions answered.  All belongings sent with patient.

## 2023-02-08 NOTE — PROGRESS NOTES
Physical Therapy Discharge Summary    Reason for therapy discharge:    All goals and outcomes met, no further needs identified.    Progress towards therapy goal(s). See goals on Care Plan in [facility] electronic health record for goal details.  Goals met    Therapy recommendation(s):    Continue home exercise program.

## 2023-02-08 NOTE — PLAN OF CARE
Goal Outcome Evaluation:    Patient vital signs are at baseline: Yes  Patient able to ambulate as they were prior to admission or with assist devices provided by therapies during their stay:  Yes  Patient MUST void prior to discharge:  Yes  Patient able to tolerate oral intake:  Yes  Pain has adequate pain control using Oral analgesics:  Yes  Does patient have an identified :  Yes  Has goal D/C date and time been discussed with patient:  Yes   Pain managed with Tylenol, cold pack, and Oxycodone. Ax1 gait belt and walker. Plan to discharge home pending therapies.

## 2023-03-15 LAB — INR (EXTERNAL): 2.7 (ref 0.9–1.1)

## 2023-03-29 ENCOUNTER — TELEPHONE (OUTPATIENT)
Dept: CARDIOLOGY | Facility: CLINIC | Age: 58
End: 2023-03-29
Payer: COMMERCIAL

## 2023-03-29 DIAGNOSIS — Z86.73 HISTORY OF CVA (CEREBROVASCULAR ACCIDENT): ICD-10-CM

## 2023-03-29 DIAGNOSIS — I42.8 NONISCHEMIC CARDIOMYOPATHY (H): ICD-10-CM

## 2023-03-29 RX ORDER — EZETIMIBE 10 MG/1
10 TABLET ORAL DAILY
Qty: 90 TABLET | Refills: 0 | Status: SHIPPED | OUTPATIENT
Start: 2023-03-29 | End: 2023-05-10

## 2023-03-29 NOTE — TELEPHONE ENCOUNTER
[facility] Call Center    Phone Message    May a detailed message be left on voicemail: no     Reason for Call: Medication Question or concern regarding medication   Prescription Clarification  Name of Medication: ezetimibe (ZETIA) 10 MG tablet  Prescribing Provider: Grover Oliveira MD    Pharmacy:   [facility]   [address] [place] [address]  Phone# [phone]   What on the order needs clarification?   Refill request. Pt has 5 days remaining of this medication, high priority.   All of the remaining medications will need to be changed to the new pharmacy listed above.           Action Taken: Other: [facility]    Travel Screening: Not Applicable

## 2023-05-02 ENCOUNTER — ANTICOAGULATION THERAPY VISIT (OUTPATIENT)
Dept: ANTICOAGULATION | Facility: CLINIC | Age: 58
End: 2023-05-02

## 2023-05-02 ENCOUNTER — OFFICE VISIT (OUTPATIENT)
Dept: FAMILY MEDICINE | Facility: CLINIC | Age: 58
End: 2023-05-02
Payer: COMMERCIAL

## 2023-05-02 VITALS
SYSTOLIC BLOOD PRESSURE: 110 MMHG | RESPIRATION RATE: 17 BRPM | DIASTOLIC BLOOD PRESSURE: 68 MMHG | OXYGEN SATURATION: 95 % | WEIGHT: 220 LBS | HEART RATE: 59 BPM | TEMPERATURE: 97.1 F | HEIGHT: 69 IN | BODY MASS INDEX: 32.58 KG/M2

## 2023-05-02 DIAGNOSIS — I50.20 HEART FAILURE WITH REDUCED EJECTION FRACTION (H): ICD-10-CM

## 2023-05-02 DIAGNOSIS — Z11.59 NEED FOR HEPATITIS C SCREENING TEST: ICD-10-CM

## 2023-05-02 DIAGNOSIS — I63.411 CEREBRAL INFARCTION DUE TO EMBOLISM OF RIGHT MIDDLE CEREBRAL ARTERY (H): Primary | ICD-10-CM

## 2023-05-02 DIAGNOSIS — Z12.11 SCREEN FOR COLON CANCER: ICD-10-CM

## 2023-05-02 DIAGNOSIS — Z11.4 SCREENING FOR HIV (HUMAN IMMUNODEFICIENCY VIRUS): ICD-10-CM

## 2023-05-02 DIAGNOSIS — J45.30 MILD PERSISTENT ASTHMA WITHOUT COMPLICATION: ICD-10-CM

## 2023-05-02 DIAGNOSIS — I42.8 NONISCHEMIC CARDIOMYOPATHY (H): ICD-10-CM

## 2023-05-02 DIAGNOSIS — Z23 ENCOUNTER FOR IMMUNIZATION: ICD-10-CM

## 2023-05-02 LAB — INR PPP: 1.71 (ref 0.85–1.15)

## 2023-05-02 PROCEDURE — 90677 PCV20 VACCINE IM: CPT | Performed by: FAMILY MEDICINE

## 2023-05-02 PROCEDURE — 85610 PROTHROMBIN TIME: CPT | Performed by: FAMILY MEDICINE

## 2023-05-02 PROCEDURE — 99204 OFFICE O/P NEW MOD 45 MIN: CPT | Mod: 25 | Performed by: FAMILY MEDICINE

## 2023-05-02 PROCEDURE — 36415 COLL VENOUS BLD VENIPUNCTURE: CPT | Performed by: FAMILY MEDICINE

## 2023-05-02 PROCEDURE — 90471 IMMUNIZATION ADMIN: CPT | Performed by: FAMILY MEDICINE

## 2023-05-02 ASSESSMENT — ASTHMA QUESTIONNAIRES
ACT_TOTALSCORE: 17
EMERGENCY_ROOM_LAST_YEAR_TOTAL: ONE
QUESTION_2 LAST FOUR WEEKS HOW OFTEN HAVE YOU HAD SHORTNESS OF BREATH: MORE THAN ONCE A DAY
QUESTION_5 LAST FOUR WEEKS HOW WOULD YOU RATE YOUR ASTHMA CONTROL: WELL CONTROLLED
QUESTION_1 LAST FOUR WEEKS HOW MUCH OF THE TIME DID YOUR ASTHMA KEEP YOU FROM GETTING AS MUCH DONE AT WORK, SCHOOL OR AT HOME: A LITTLE OF THE TIME
QUESTION_3 LAST FOUR WEEKS HOW OFTEN DID YOUR ASTHMA SYMPTOMS (WHEEZING, COUGHING, SHORTNESS OF BREATH, CHEST TIGHTNESS OR PAIN) WAKE YOU UP AT NIGHT OR EARLIER THAN USUAL IN THE MORNING: NOT AT ALL
ACT_TOTALSCORE: 17
QUESTION_4 LAST FOUR WEEKS HOW OFTEN HAVE YOU USED YOUR RESCUE INHALER OR NEBULIZER MEDICATION (SUCH AS ALBUTEROL): TWO OR THREE TIMES PER WEEK

## 2023-05-02 ASSESSMENT — PAIN SCALES - GENERAL: PAINLEVEL: NO PAIN (0)

## 2023-05-02 NOTE — PROGRESS NOTES
Assessment/Plan:    Cerebral infarction due to embolism of right middle cerebral artery (H)  History of right middle cerebral artery embolic CVA February 2021.  Chronic anticoagulation continuing currently with warfarin 3 mg daily.  Standing order INR with anticoagulation nurse management provided.  - INR  - Anticoagulation Clinic Referral    Nonischemic cardiomyopathy (H)  History nonischemic cardiomyopathy with resolved reduced ejection fraction most recent echocardiogram November 8, 2022 with EF 55 to 60%.  Continues to follow with Dr. Oliveira and was to follow-up shortly following prior November 8, 2022 office visit.  Continues use of carvedilol 18.75 mg twice daily, enalapril 10 mg daily, spironolactone 25 mg daily and furosemide 20 mg every morning.  Remains on cholesterol management with atorvastatin 40 mg daily and Zetia 10 mg daily as well    Heart failure with reduced ejection fraction (H)  Resolved ejection fraction reduction and now with a EF 55 to 60% and most recent echocardiogram November 8, 2022    Mild persistent asthma without complication  History of described asthma currently on Asmanex 200 mcg using 2 puffs twice daily plus albuterol MDI on as-needed basis    Screen for colon cancer  Describes completing Cologuard about a year ago which was normal and will repeat at 3-year interval.    Encounter for immunization  Pneumococcal 20 vaccination provided today.  - Pneumococcal 20 Valent Conjugate (Prevnar 20)    Anticipate follow-up 6 months for fasting physical exam.    The following high BMI interventions were performed this visit: encouragement to exercise, weight monitoring, weight loss from baseline weight and lifestyle education regarding diet .  Ensure ongoing efforts to achieve weight goal < 210 pounds initially, < 200 pounds ideally.         Subjective:    Matthew W Behr is seen today for establishment of care.  Describes having been with [facility] previously.  History of  nonischemic cardiomyopathy with question prior history of myocarditis.  Prior heart failure with reduced ejection fraction however subsequent improvement noted with most recent echocardiogram 2022 showing EF of 55 to 60%.  Patient does continue use of carvedilol 12.5 mg using 1-1/2 tablets (18.75 mg) by mouth twice daily as well as enalapril 10 mg daily and furosemide 20 mg each morning.  Utilizing atorvastatin 40 mg daily as high intensity statin therapy along with Zetia 10 mg daily for lipid management.  Also continue spironolactone 25 mg daily.  Follows with Dr. Oliveira and was seen 2022 with 6-month follow-up recommendation noted.  Patient had been hospitalized around 2021 with described pneumonia and on the way out of the hospital sustained apparent embolic ischemic CVA right middle cerebral artery with left hemiparesis findings.  Subsequent improvement in left hemiparesis other than described residual numbness in the left hand.  Asmanex 200 mcg using 2 puffs twice daily with albuterol MDI on as-needed basis for asthma management without breakthrough symptoms.  Most recent lipid panel March 15, 2023 with total cholesterol 159, triglycerides 144, HDL 49 and LDL 81.  Comprehensive review of systems as above otherwise all negative      Remarried - Margie x    1 son   2 children prior relationship   3 step children   Tobacco:  none   EtOH:  occ   Mom -  65 emphysema (smoker)   Dad -   3 sis -   1 bro - asthma   Surgeries:  right MARIA T (23);   Hospitalizations:  h/o pneumonia with subsequent h/o ischemic CVA on right with left sided hemiparesis (residual decrease sensation in left hand otherwise complete resolution of symptoms)   Work:  commercial drain cleaning   Hobbies:  sports (participating)      Past Surgical History:   Procedure Laterality Date     ARTHROPLASTY HIP Right 2023    Procedure: RIGHT TOTAL HIP ARTHROPLASTY;  Surgeon: Agustín Virk MD;  Location:  [facility]     OTHER SURGICAL HISTORY  2003    [facility]        Family History   Problem Relation Age of Onset     Chronic Obstructive Pulmonary Disease Mother         Past Medical History:   Diagnosis Date     Acute respiratory failure with hypoxia (H)      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    patient denies     Arthritis      Asthma      Cellulitis     Left hand     Cerebral artery occlusion with cerebral infarction (H) 2021     Chest pain with normal coronary angiography 01/03/2018     Congestive heart failure (H)      Hypertension      Nonischemic cardiomyopathy (H) 01/10/2018     Janel Parkinson White pattern seen on electrocardiogram         Social History     Tobacco Use     Smoking status: Never     Passive exposure: Never     Smokeless tobacco: Former     Types: Chew   Substance Use Topics     Alcohol use: Yes     Comment: Alcoholic Drinks/day: 2 drinks per month     Drug use: No        Current Outpatient Medications   Medication Sig Dispense Refill     acetaminophen (TYLENOL) 500 MG tablet Take 2 tablets (1,000 mg) by mouth every 8 hours       ASMANEX  mcg/actuation HFAA Inhale 2 puffs into the lungs 2 times daily       atorvastatin (LIPITOR) 40 MG tablet Take 1 tablet (40 mg) by mouth daily 90 tablet 3     carvedilol (COREG) 12.5 MG tablet Take 1.5 tablets (18.75 mg) by mouth 2 times daily (with meals) 180 tablet 3     enalapril (VASOTEC) 10 MG tablet Take 1 tablet (10 mg) by mouth daily 90 tablet 3     ezetimibe (ZETIA) 10 MG tablet Take 1 tablet (10 mg) by mouth daily 90 tablet 0     furosemide (LASIX) 20 MG tablet Take 1 tablet (20 mg) by mouth daily 90 tablet 3     hydrOXYzine (ATARAX) 25 MG tablet Take 1 tablet (25 mg) by mouth every 6 hours as needed for other (adjuvant pain)       senna-docusate (SENOKOT-S/PERICOLACE) 8.6-50 MG tablet Take 1 tablet by mouth 2 times daily as needed for constipation       spironolactone  "(ALDACTONE) 25 MG tablet Take 1 tablet (25 mg) by mouth daily 90 tablet 3     VENTOLIN HFA 90 mcg/actuation inhaler Take 2 puffs by mouth every 4 hours as needed for shortness of breath or wheezing  0     warfarin ANTICOAGULANT (COUMADIN/JANTOVEN) 3 MG tablet [WARFARIN ANTICOAGULANT (COUMADIN/JANTOVEN) 3 MG TABLET] Take 1 tablets (3 mg) by mouth daily. Adjust dose based on INR results as directed. 30 tablet 0     oxyCODONE (ROXICODONE) 5 MG tablet Take 1-2 tablets (5-10 mg) by mouth every 4 hours as needed for moderate to severe pain (Patient not taking: Reported on 5/2/2023)  0          Objective:    Vitals:    05/02/23 0942   BP: 110/68   Pulse: 59   Resp: 17   Temp: 97.1  F (36.2  C)   SpO2: 95%   Weight: 99.8 kg (220 lb)   Height: 1.759 m (5' 9.25\")      Body mass index is 32.25 kg/m .    Alert.  No apparent distress.  Mildly ataxic gait with heel toe walking however is status post right total hip arthroplasty February 6, 2023.  Patient also with equivocal left pronator drift on neurologic exam otherwise neurologic exam appears normal.  Cardiac exam regular with pulse around 58-60 beats a minute.  Chest appears clear.  BMI 32.25.      Complete Echo Adult 1/9/23. Definity (NDC #49436-081) given intravenously. 2mL given.  ______________________________________________________________________________  Interpretation Summary     1. The left ventricle is normal in size. Left ventricular function is  normal.The ejection fraction is 55-60%. Left ventricular diastolic function is  normal. No regional wall motion abnormalities noted.  2. Normal right ventricle size and systolic function.  3. Normal left atrial size.  4. No hemodynamically significant valvular abnormalities on 2D or color flow  imaging.        This note has been dictated using voice recognition software and as a result may contain minor grammatical errors and unintended word substitutions.           Answers for HPI/ROS submitted by the patient on " 5/2/2023  Do you have a cough?: No  Are you experiencing any wheezing in your chest?: No  Do you have any shortness of breath?: No  Use of rescue inhaler:: a few times a week  Taking Asthma medication as prescribed:: 1  Asthma triggers:: upper respiratory infections  Have you had any Emergency Room visits, Urgent Care visits, or Hospital Admissions since your last office visit?: Yes  Number of ER or Urgent Care visits for asthma : 3 or more times  Number of Hospital admissions for asthma : 3 or more times  Dyspnea:: with activity only  Status:: improved  Edema:: No  Are you using more pillows than usual at night?: No  Do you cough at night?: No  Checking weight daily:: Yes  Weight change recently:: None  Heart Medication Side Effects:: fatigue, slow heartbeat  Frequency:: 1 time  How many servings of fruits and vegetables do you eat daily?: 0-1  On average, how many sweetened beverages do you drink each day (Examples: soda, juice, sweet tea, etc.  Do NOT count diet or artificially sweetened beverages)?: 2  How many minutes a day do you exercise enough to make your heart beat faster?: 60 or more  How many days a week do you exercise enough to make your heart beat faster?: 5  How many days per week do you miss taking your medication?: 0

## 2023-05-02 NOTE — PROGRESS NOTES
"ANTICOAGULATION  MANAGEMENT: NEW REFERRAL      SUBJECTIVE/OBJECTIVE     Matthew W Behr, a 57 year old male  is newly referred to [facility].    Anticoagulation:    Previously on warfarin: yes, currently on warfarin transferring anticoagulation management to [facility]. Previously managed by [facility]. Most recent warfarin dose 3 mg daily since at least March 2023.  Most recent INR 2.7 on 3/15/23.  Warfarin initiation date (approximate): 3/19/2021   Indication(s): Stroke   Goal Range: 2.0-3.0   Anticoagulation Bridge/Overlap: No   Referring provider: from PCP    General Dietary/Social Hx:    Typical vitamin K intake: low; consistent     Other dietary considerations: None     Social History:   Social History     Tobacco Use     Smoking status: Never     Passive exposure: Never     Smokeless tobacco: Former     Types: Chew   Substance Use Topics     Alcohol use: Yes     Comment: Alcoholic Drinks/day: 2 drinks per month     Drug use: No       In the past 2 weeks, patient estimates taking medications as instructed % of time: 100%    Results:        No results for input(s): INR, [id number], F2, ALMWH in the last 168 hours.    Wt Readings from Last 2 Encounters:   05/02/23 99.8 kg (220 lb)   02/07/23 96.8 kg (213 lb 8 oz)      Estimated body mass index is 32.25 kg/m  as calculated from the following:    Height as of an earlier encounter on 5/2/23: 1.759 m (5' 9.25\").    Weight as of an earlier encounter on 5/2/23: 99.8 kg (220 lb).  Lab Results   Component Value Date    AST 50 11/30/2022     Lab Results   Component Value Date    CR 0.86 02/07/2023     CrCl cannot be calculated (Patient's most recent lab result is older than the maximum 30 days allowed.).    ASSESSMENT       Goal INR 2-3, standard for indication(s) above  Establishing initial warfarin maintenance dose (on warfarin < 30 days)     Maintenance dose established prior to referral    PLAN     Dosing Instructions: " Continue your current warfarin dose - We are still waiting on venous INR result from today    Summary  As of 5/2/2023    Full warfarin instructions:  3 mg every day   Next INR check:               Education provided:     Taking warfarin: take warfarin at same time each day; preferably in the evening and prescribed tablet strength and color    Dietary considerations: importance of consistent vitamin K intake and importance of notifying [facility] to changes in diet    Importance of notifying anticoagulation clinic for: changes in medications; a sooner lab recheck maybe needed and upcoming surgeries and procedures 2 weeks in advance    Contact [phone] with any changes, questions or concerns.     Education still needed:     None required      Telephone call with Dwain who verbalizes understanding and agrees to plan    Can make next INR appt when result is received and Dwain is contacted at that time.    Standing orders placed in [facility]: Point of Care INR (Lab 5000)    Plan made per [facility] anticoagulation protocol    Karen Bird RN  Anticoagulation Clinic  5/2/2023

## 2023-05-03 ENCOUNTER — ANTICOAGULATION THERAPY VISIT (OUTPATIENT)
Dept: ANTICOAGULATION | Facility: CLINIC | Age: 58
End: 2023-05-03
Payer: COMMERCIAL

## 2023-05-03 DIAGNOSIS — I63.411 CEREBRAL INFARCTION DUE TO EMBOLISM OF RIGHT MIDDLE CEREBRAL ARTERY (H): Primary | ICD-10-CM

## 2023-05-03 NOTE — PROGRESS NOTES
ANTICOAGULATION MANAGEMENT     Matthew W Behr 57 year old male is on warfarin with subtherapeutic INR result. (Goal INR 2.0-3.0)    Recent labs: (last 7 days)     05/02/23  1042   INR 1.71*       ASSESSMENT       Source(s): Chart Review and Patient/Caregiver Call       Warfarin doses taken: Warfarin taken as instructed    Diet: No new diet changes identified    Medication/supplement changes: None noted    New illness, injury, or hospitalization: No    Signs or symptoms of bleeding or clotting: No    Previous result: Therapeutic last visit; previously outside of goal range    Additional findings: Established care 5/2/23. Has been on warfarin for 2 years, managed by [facility]. See anticoagulation charting from 5/2/23 for further information.     PLAN     Recommended plan for no diet, medication or health factor changes affecting INR     Dosing Instructions: Increase your warfarin dose (7% change) with next INR in 1-2 weeks       Summary  As of 5/3/2023    Full warfarin instructions:  4.5 mg every Wed; 3 mg all other days   Next INR check:  5/16/2023             Detailed voice message left for Dwain with dosing instructions and follow up date.     Contact [phone] to schedule and with any changes, questions or concerns.     Education provided:     Please call back if any changes to your diet, medications or how you've been taking warfarin    Goal range and lab monitoring: goal range and significance of current result, Importance of therapeutic range and Importance of following up at instructed interval    Contact [phone] with any changes, questions or concerns.     Plan made per [facility] anticoagulation protocol    Karen Bird RN  Anticoagulation Clinic  5/3/2023    _______________________________________________________________________     Anticoagulation Episode Summary     Current INR goal:  2.0-3.0   TTR:  --   Target end date:  Indefinite   Send INR reminders to:  [facility]    Indications     Cerebral infarction due to embolism of right middle cerebral artery (H) [I63.411]           Comments:           Anticoagulation Care Providers     Provider Role Specialty Phone number    Mark Mariscal MD Referring Family Medicine [phone]

## 2023-05-09 DIAGNOSIS — Z86.73 HISTORY OF CVA (CEREBROVASCULAR ACCIDENT): ICD-10-CM

## 2023-05-09 DIAGNOSIS — I42.8 NONISCHEMIC CARDIOMYOPATHY (H): ICD-10-CM

## 2023-05-10 RX ORDER — EZETIMIBE 10 MG/1
TABLET ORAL
Qty: 90 TABLET | Refills: 1 | Status: SHIPPED | OUTPATIENT
Start: 2023-05-10 | End: 2023-07-20

## 2023-05-18 ENCOUNTER — LAB (OUTPATIENT)
Dept: LAB | Facility: CLINIC | Age: 58
End: 2023-05-18
Payer: COMMERCIAL

## 2023-05-18 ENCOUNTER — ANTICOAGULATION THERAPY VISIT (OUTPATIENT)
Dept: ANTICOAGULATION | Facility: CLINIC | Age: 58
End: 2023-05-18

## 2023-05-18 DIAGNOSIS — I63.411 CEREBRAL INFARCTION DUE TO EMBOLISM OF RIGHT MIDDLE CEREBRAL ARTERY (H): Primary | ICD-10-CM

## 2023-05-18 DIAGNOSIS — I63.411 CEREBRAL INFARCTION DUE TO EMBOLISM OF RIGHT MIDDLE CEREBRAL ARTERY (H): ICD-10-CM

## 2023-05-18 LAB — INR BLD: 2.4 (ref 0.9–1.1)

## 2023-05-18 PROCEDURE — 36416 COLLJ CAPILLARY BLOOD SPEC: CPT

## 2023-05-18 PROCEDURE — 85610 PROTHROMBIN TIME: CPT

## 2023-05-18 RX ORDER — WARFARIN SODIUM 3 MG/1
3-4.5 TABLET ORAL DAILY
Qty: 135 TABLET | Refills: 1 | Status: SHIPPED | OUTPATIENT
Start: 2023-05-18 | End: 2023-09-21 | Stop reason: ALTCHOICE

## 2023-05-18 NOTE — PROGRESS NOTES
ANTICOAGULATION MANAGEMENT     Matthew W Behr 57 year old male is on warfarin with therapeutic INR result. (Goal INR 2.0-3.0)    Recent labs: (last 7 days)     05/18/23  0700   INR 2.4*       ASSESSMENT     Warfarin Lab Questionnaire    Warfarin Doses Last 7 Days      5/18/2023     6:56 AM   Dose in Tablet or Mg   TAB or MG? milligram (mg)     Pt Rptd Dose SUNDAY MONDAY TUESDAY WED THURS FRIDAY SATURDAY 5/18/2023   6:56 AM 3 3 3 3.5 3 3 3         5/18/2023   Warfarin Lab Questionnaire   Missed doses within past 14 days? No   Changes in diet or alcohol within past 14 days? No   Medication changes since last result? No   Injuries or illness since last result? No   New shortness of breath, severe headaches or sudden changes in vision since last result? No   Abnormal bleeding since last result? No   Upcoming surgery, procedure? No        Previous result: Subtherapeutic  Additional findings: Refill needed today. Antonio meets all criteria for refill (current [facility] referral, office visit with referring provider/group in last year, lab monitoring up to date or not exceeding 2 weeks overdue). Rx instructions and quantity supplied updated to match patient's current dosing plan. Warfarin 90 day supply with 1 refill granted per [facility] protocol        PLAN     Recommended plan for no diet, medication or health factor changes affecting INR     Dosing Instructions: Continue your current warfarin dose with next INR in 3-4 weeks       Summary  As of 5/18/2023    Full warfarin instructions:  4.5 mg every Wed; 3 mg all other days   Next INR check:  6/15/2023             Telephone call with Dwain who verbalizes understanding and agrees to plan    Patient offered & declined to schedule next visit    Education provided:     Contact [phone] with any changes, questions or concerns.     Plan made per [facility] anticoagulation protocol    Suzie Ramos RN  Anticoagulation  Clinic  5/18/2023    _______________________________________________________________________     Anticoagulation Episode Summary     Current INR goal:  2.0-3.0   TTR:  100.0 % (6 d)   Target end date:  Indefinite   Send INR reminders to:  [facility]    Indications    Cerebral infarction due to embolism of right middle cerebral artery (H) [I63.411]           Comments:           Anticoagulation Care Providers     Provider Role Specialty Phone number    Mark Mariscal MD Referring Family Medicine [phone]

## 2023-06-22 ENCOUNTER — TELEPHONE (OUTPATIENT)
Dept: ANTICOAGULATION | Facility: CLINIC | Age: 58
End: 2023-06-22
Payer: COMMERCIAL

## 2023-06-22 NOTE — TELEPHONE ENCOUNTER
ANTICOAGULATION     Matthew W Behr is overdue for INR check.      Spoke with Dwain who declined to schedule a lab appointment at this time. If calling back please schedule as soon as possible.    Karen Bird RN

## 2023-07-12 ENCOUNTER — DOCUMENTATION ONLY (OUTPATIENT)
Dept: ANTICOAGULATION | Facility: CLINIC | Age: 58
End: 2023-07-12
Payer: COMMERCIAL

## 2023-07-12 NOTE — PROGRESS NOTES
ANTICOAGULATION     Matthew W Behr is overdue for INR check.      Reminder letter sent    Mary Martinez RN

## 2023-07-12 NOTE — LETTER
[facility]TICO[facility]UL[facility]IC  [address] [facility] [address]  Phone: [phone]  Fax: [phone]   July 12, 2023        Matthew W Behr  [address]  [facility] [address]            Dear Antonio,    You are currently under the care of [facility] for your warfarin (Coumadin , Jantoven ) therapy.  We are contacting you because our records show you were due for an INR on 6/15/23.    There are potentially serious risks when taking warfarin without careful monitoring and we want to make sure you are safely managed.  Routine lab monitoring is required for warfarin refills.     Please call [phone] as soon as possible to schedule an appointment.  If there has been a change in your care or other concerns, please let us know so we can help and or update our records.     Sincerely,       [facility]

## 2023-07-20 ENCOUNTER — TELEPHONE (OUTPATIENT)
Dept: CARDIOLOGY | Facility: CLINIC | Age: 58
End: 2023-07-20
Payer: COMMERCIAL

## 2023-07-20 DIAGNOSIS — I50.9 ACUTE DECOMPENSATED HEART FAILURE (H): ICD-10-CM

## 2023-07-20 DIAGNOSIS — I42.8 NONISCHEMIC CARDIOMYOPATHY (H): ICD-10-CM

## 2023-07-20 DIAGNOSIS — Z86.73 HISTORY OF CVA (CEREBROVASCULAR ACCIDENT): ICD-10-CM

## 2023-07-20 DIAGNOSIS — I63.411 CEREBRAL INFARCTION DUE TO EMBOLISM OF RIGHT MIDDLE CEREBRAL ARTERY (H): ICD-10-CM

## 2023-07-20 RX ORDER — FUROSEMIDE 20 MG
20 TABLET ORAL DAILY
Qty: 90 TABLET | Refills: 0 | Status: SHIPPED | OUTPATIENT
Start: 2023-07-20 | End: 2023-10-10

## 2023-07-20 RX ORDER — ENALAPRIL MALEATE 10 MG/1
10 TABLET ORAL DAILY
Qty: 90 TABLET | Refills: 0 | Status: SHIPPED | OUTPATIENT
Start: 2023-07-20 | End: 2023-10-13

## 2023-07-20 RX ORDER — EZETIMIBE 10 MG/1
TABLET ORAL
Qty: 90 TABLET | Refills: 0 | Status: SHIPPED | OUTPATIENT
Start: 2023-07-20 | End: 2023-12-14

## 2023-07-20 RX ORDER — CARVEDILOL 12.5 MG/1
18.75 TABLET ORAL 2 TIMES DAILY WITH MEALS
Qty: 180 TABLET | Refills: 0 | Status: SHIPPED | OUTPATIENT
Start: 2023-07-20 | End: 2023-09-11

## 2023-07-20 RX ORDER — ATORVASTATIN CALCIUM 40 MG/1
40 TABLET, FILM COATED ORAL DAILY
Qty: 90 TABLET | Refills: 0 | Status: SHIPPED | OUTPATIENT
Start: 2023-07-20 | End: 2023-10-13

## 2023-07-20 NOTE — TELEPHONE ENCOUNTER
Pt called for refills of heart meds, except spironolactone, because he doesn't have enough to get to August visit with Dr Oliveira.    Filled under [facility]:  Atorvastatin 90 day supply   Carvedilol 90 day supply  Enalapril 90 day supply     Filled under Dr Oliveira:  Ezetimibe 90 day supply  Furosemide 90 day supply    Pt scheduled for follow-up 8/23/23 and further refills should be requested at that visit.  campos

## 2023-07-26 ENCOUNTER — DOCUMENTATION ONLY (OUTPATIENT)
Dept: ANTICOAGULATION | Facility: CLINIC | Age: 58
End: 2023-07-26
Payer: COMMERCIAL

## 2023-07-26 NOTE — PROGRESS NOTES
Anticoagulation clinic notificiation    Dr. Mariscal,    Your patient, Matthew W Behr, is past due for an INR. Their last result was 2.4 on 5/18/23 and was due to come back on 6/15/23.    Matthew W Behr received phone calls and letters over the last several weeks in attempt to arrange a follow up appointment.  Matthew W Behr will be sent another letter today.     No action is required from you unless you have additional information or if you would like to reach out personally to Matthew W Behr.    Please don t hesitate to contact the Anticoagulation Management Program if you have any questions or concerns.     Sincerely,     [facility]

## 2023-07-26 NOTE — LETTER
[facility] ANTICOAGULATION CLINIC  [address] [facility] [address]  Phone: [phone]  Fax: [phone]     July 26, 2023        Matthew W Behr  [address]  [facility] [address]            Dear Antonio,    You are currently under the care of [facility] for your warfarin (Coumadin , Jantoven ) therapy.  We are contacting you because our records show you were due for an INR on 6/15/23.    There are potentially serious risks when taking warfarin without careful monitoring and we want to make sure you are safely managed.  Routine lab monitoring is required for warfarin refills.     Please call [phone] as soon as possible to schedule an appointment.  If there has been a change in your care or other concerns, please let us know so we can help and or update our records.     Sincerely,       [facility]

## 2023-08-01 ENCOUNTER — ANTICOAGULATION THERAPY VISIT (OUTPATIENT)
Dept: ANTICOAGULATION | Facility: CLINIC | Age: 58
End: 2023-08-01

## 2023-08-01 ENCOUNTER — LAB (OUTPATIENT)
Dept: LAB | Facility: CLINIC | Age: 58
End: 2023-08-01
Payer: COMMERCIAL

## 2023-08-01 DIAGNOSIS — I63.411 CEREBRAL INFARCTION DUE TO EMBOLISM OF RIGHT MIDDLE CEREBRAL ARTERY (H): ICD-10-CM

## 2023-08-01 DIAGNOSIS — I63.411 CEREBRAL INFARCTION DUE TO EMBOLISM OF RIGHT MIDDLE CEREBRAL ARTERY (H): Primary | ICD-10-CM

## 2023-08-01 LAB — INR BLD: 1.7 (ref 0.9–1.1)

## 2023-08-01 PROCEDURE — 85610 PROTHROMBIN TIME: CPT

## 2023-08-01 PROCEDURE — 36416 COLLJ CAPILLARY BLOOD SPEC: CPT

## 2023-08-01 NOTE — PROGRESS NOTES
ANTICOAGULATION MANAGEMENT     Matthew W Behr 57 year old male is on warfarin with subtherapeutic INR result. (Goal INR 2.0-3.0)    Recent labs: (last 7 days)     08/01/23  0706   INR 1.7*       ASSESSMENT     Warfarin Lab Questionnaire    Warfarin Doses Last 7 Days      8/1/2023     7:08 AM   Dose in Tablet or Mg   TAB or MG? tablet (tab)     Pt Rptd Dose SUNDAY MONDAY TUESDAY WED THURS FRIDAY SATURDAY 8/1/2023   7:08 AM 1 1.5 1 1 1 1 1         8/1/2023   Warfarin Lab Questionnaire   Missed doses within past 14 days? Possible missed dose over the weekend   Changes in diet or alcohol within past 14 days? No   Medication changes since last result? No   Injuries or illness since last result? No   New shortness of breath, severe headaches or sudden changes in vision since last result? No   Abnormal bleeding since last result? No   Upcoming surgery, procedure? No     Previous result: Therapeutic last visit; previously outside of goal range  Additional findings: None       PLAN     Recommended plan for temporary change(s) affecting INR     Dosing Instructions: booster dose then continue your current warfarin dose with next INR in 2 weeks       Summary  As of 8/1/2023      Full warfarin instructions:  8/1: 4.5 mg; Otherwise 4.5 mg every Mon; 3 mg all other days   Next INR check:  8/15/2023               Telephone call with Dwain who verbalizes understanding and agrees to plan    Patient offered & declined to schedule next visit    Education provided:   Goal range and lab monitoring: goal range and significance of current result  Contact [phone] with any changes, questions or concerns.     Plan made per [facility] anticoagulation protocol    Sharmin Thompson, RN  Anticoagulation Clinic  8/1/2023    _______________________________________________________________________     Anticoagulation Episode Summary       Current INR goal:  2.0-3.0   TTR:  60.5 % (2.7 mo)   Target end date:  Indefinite   Send INR reminders to:   [facility]    Indications    Cerebral infarction due to embolism of right middle cerebral artery (H) [I63.411]             Comments:               Anticoagulation Care Providers       Provider Role Specialty Phone number    Mark Mariscal MD Referring Family Medicine [phone]

## 2023-08-05 ENCOUNTER — HEALTH MAINTENANCE LETTER (OUTPATIENT)
Age: 58
End: 2023-08-05

## 2023-08-23 ENCOUNTER — OFFICE VISIT (OUTPATIENT)
Dept: CARDIOLOGY | Facility: CLINIC | Age: 58
End: 2023-08-23
Attending: INTERNAL MEDICINE
Payer: COMMERCIAL

## 2023-08-23 VITALS
RESPIRATION RATE: 12 BRPM | DIASTOLIC BLOOD PRESSURE: 66 MMHG | BODY MASS INDEX: 30.79 KG/M2 | SYSTOLIC BLOOD PRESSURE: 98 MMHG | WEIGHT: 210 LBS | HEART RATE: 68 BPM | OXYGEN SATURATION: 96 %

## 2023-08-23 DIAGNOSIS — I50.20 HEART FAILURE WITH REDUCED EJECTION FRACTION (H): ICD-10-CM

## 2023-08-23 DIAGNOSIS — I50.22 CHRONIC SYSTOLIC CONGESTIVE HEART FAILURE (H): ICD-10-CM

## 2023-08-23 DIAGNOSIS — I42.8 NONISCHEMIC CARDIOMYOPATHY (H): ICD-10-CM

## 2023-08-23 LAB
ANION GAP SERPL CALCULATED.3IONS-SCNC: 9 MMOL/L (ref 7–15)
BNP SERPL-MCNC: 42 PG/ML (ref 0–47)
BUN SERPL-MCNC: 19.2 MG/DL (ref 6–20)
CALCIUM SERPL-MCNC: 8.9 MG/DL (ref 8.6–10)
CHLORIDE SERPL-SCNC: 104 MMOL/L (ref 98–107)
CREAT SERPL-MCNC: 0.83 MG/DL (ref 0.67–1.17)
DEPRECATED HCO3 PLAS-SCNC: 25 MMOL/L (ref 22–29)
GFR SERPL CREATININE-BSD FRML MDRD: >90 ML/MIN/1.73M2
GLUCOSE SERPL-MCNC: 109 MG/DL (ref 70–99)
MAGNESIUM SERPL-MCNC: 2 MG/DL (ref 1.7–2.3)
POTASSIUM SERPL-SCNC: 4.7 MMOL/L (ref 3.4–5.3)
SODIUM SERPL-SCNC: 138 MMOL/L (ref 136–145)

## 2023-08-23 PROCEDURE — 80048 BASIC METABOLIC PNL TOTAL CA: CPT | Performed by: INTERNAL MEDICINE

## 2023-08-23 PROCEDURE — 99214 OFFICE O/P EST MOD 30 MIN: CPT | Performed by: INTERNAL MEDICINE

## 2023-08-23 PROCEDURE — 83880 ASSAY OF NATRIURETIC PEPTIDE: CPT | Performed by: INTERNAL MEDICINE

## 2023-08-23 PROCEDURE — 83735 ASSAY OF MAGNESIUM: CPT | Performed by: INTERNAL MEDICINE

## 2023-08-23 PROCEDURE — 36415 COLL VENOUS BLD VENIPUNCTURE: CPT | Performed by: INTERNAL MEDICINE

## 2023-08-23 NOTE — LETTER
8/23/2023    Segundo Kaufman MD  [facility] [id number]  [facility] [address]    RE: Matthew W Behr       Dear Colleague,     I had the pleasure of seeing Matthew W Behr in the [facility].    HEART CARE ENCOUNTER CONSULTATON NOTE      [facility]  [phone]      Assessment/Recommendations   Assessment/Plan:  1.  History of heart failure with reduced ejection fraction, nonischemic cardiomyopathy possible prior episodes of myocarditis.  Echocardiogram most recently in January 2023 revealed persistent improvement and normalization of systolic function.  He continues to feel well without signs or symptoms failure.  He has been taking his medications as prescribed.  We reviewed the history and most recent echocardiogram results.  Plan to repeat an echocardiogram and then correspond with neurology, primary care regarding potential discontinuation of warfarin.    2.  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  Patient has been seen by Dr. Barrow in the past.  Notes regarding warfarin and potential discontinuation May 2022 reviewed.  As outlined above.    3.  Dyslipidemia.  He has been on atorvastatin postacute CVA.  Cholesterol results from March 2023 finds a cholesterol 159, triglycerides 144 with an LDL of 81.  He is on the combination of atorvastatin and Zetia and tolerating.  AST was normal March 2023 at 48.  Medications were not prescribed by neurology at that time of CVA.    Plan 1.  Laboratory studies today as outlined.  2.  Echocardiogram  3.  Follow-up in 6 months.       History of Present Illness/Subjective    HPI: Matthew W Behr is a 57 year old male who is seen in follow-up.  We last visited November 2022.  I had previously reviewed notes from Dr. Barrow from neurology May 2022 suggested that he be on warfarin for 1 more year at that time if echocardiogram looks stable at that point warfarin can be discontinued.  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  Most recent echocardiogram is from 2023 at which time ejection fraction was normal 55 to 60% without wall motion abnormality without significant valve abnormality.  MRI in  reported ejection fraction of 39%.  Small area of mid wall fibrosis which was typical of sequelae of prior episodes of myocarditis.  CT angiogram in 2018 revealed a calcium score of 0 but no significant atherosclerotic plaque or significant stenosis.  He was seen in urgent care in 2023 for cough.    He reports feeling well.  He has an active job and has no specific complaints of chest discomfort, significant shortness of breath, dizziness or lightheadedness.  He has been tolerating his current combination of medications.  His blood pressure does trend to low normal but without significant dizziness.    Recent Echocardiogram Results:  Echocardiogram Complete  Order: [id number]  Status: Edited Result - FINAL     Visible to patient: Yes (not seen)     Next appt: 2023 at 07:50 AM in Cardiology (Grover Oliveira MD)     Dx: Nonischemic cardiomyopathy (H)     3 Result Notes  Details    Reading Physician Reading Date Result Priority   Balaji FrancoDO  [phone] 2023      Narrative & Impression  [id number]  [id number]  [id number]  [id number]^SELVIN^GROVER^[place] [address]     Name: BEHR, MATTHEW W  MRN: [id number]  : 1965  Study Date: 2023 07:58 AM  Age: 57 yrs  Gender: Male  Patient Location: [facility]  Reason For Study: Nonischemic cardiomyopathy (H)  Ordering Physician: GROVER OLIVEIRA  Referring Physician: GROVER OLIVEIRA  Performed By: ACE     BSA: 2.1 m2  Height: 69 in  Weight: 216 lb  HR: 52  BP: 128/67 mmHg  ______________________________________________________________________________  Procedure  Complete  Echo Adult. Definity (NDC #56909-980) given intravenously. 2mL given.  ______________________________________________________________________________  Interpretation Summary     1. The left ventricle is normal in size. Left ventricular function is  normal.The ejection fraction is 55-60%. Left ventricular diastolic function is  normal. No regional wall motion abnormalities noted.  2. Normal right ventricle size and systolic function.  3. Normal left atrial size.  4. No hemodynamically significant valvular abnormalities on 2D or color flow  imaging.  ______________________________________________________________________________  Left Ventricle  The left ventricle is normal in size. Left ventricular function is normal.The  ejection fraction is 55-60%. There is normal left ventricular wall thickness.  Left ventricular diastolic function is normal. No regional wall motion  abnormalities noted.     Right Ventricle  Normal right ventricle size and systolic function.     Atria  Normal left atrial size. Right atrial size is normal. There is no color  Doppler evidence of an atrial shunt.     Mitral Valve  Mitral valve leaflets appear normal. There is no evidence of mitral stenosis  or clinically significant mitral regurgitation.     Tricuspid Valve  Tricuspid valve leaflets appear normal. There is no evidence of tricuspid  stenosis or clinically significant tricuspid regurgitation. Right ventricular  systolic pressure could not be approximated due to inadequate tricuspid  regurgitation.     Aortic Valve  The aortic valve is trileaflet. Aortic valve leaflets appear normal. There is  no evidence of aortic stenosis or clinically significant aortic regurgitation.     Pulmonic Valve  The pulmonic valve is not well seen, but is grossly normal. This degree of  valvular regurgitation is within normal limits. There is trace pulmonic  valvular regurgitation.     Vessels  The aorta root is normal. Normal size ascending aorta. IVC  diameter <2.1 cm  collapsing >50% with sniff suggests a normal RA pressure of 3 mmHg.     Pericardium  There is no pericardial effusion.            Recent Coronary Angiogram Results:    Electronically signed by Maxime Hernandez 2021-Jul-23 14:19:47     VITALS   ==========================================================================================================     HEIGHT: 69.02 in    (175.30 cm)  WEIGHT: 191.01 lbs    (86.64 kgs)  BSA: 2.03 m^2     SUMMARY   ==========================================================================================================     1.  Normal left ventricular size and wall thickness. Systolic function is mildly reduced with global  hypokinesis. The quantified left ventricular ejection fraction is 43%.   2.  Normal right ventricular size. Systolic function is mildly reduced. The quantified right ventricular  ejection fraction is 39%.   3.  There is a small area of midwall fibrosis in the basal inferolateral wall, in a nonischemic pattern.  The location and appearance is typical of the sequelae of a prior episode of myocarditis.  4.  No significant valvular abnormalities.      EXAM: CTA HEAD AND NECK  LOCATION: [facility]  DATE/TIME: 3/18/2021 4:22 PM     INDICATION: Stroke code. Left-sided facial droop and left-sided weakness.  COMPARISON: None.  CONTRAST: Iohexol (Omni) 75mL  TECHNIQUE: Head and neck CT angiogram with IV contrast. Noncontrast head CT followed by axial helical CT images of the head and neck vessels obtained during the arterial phase of intravenous contrast administration. Axial 2D reconstructed images and   multiplanar 3D MIP reconstructed images of the head and neck vessels were performed by the technologist. Dose reduction techniques were used. All stenosis measurements made according to NASCET criteria unless otherwise specified.     FINDINGS:   NONCONTRAST HEAD CT:   INTRACRANIAL CONTENTS: No intracranial hemorrhage, extraaxial  collection, or mass effect.  No CT evidence of acute infarct. Normal parenchymal attenuation. Normal ventricles and sulci.      VISUALIZED ORBITS/SINUSES/MASTOIDS: No intraorbital abnormality. No paranasal sinus mucosal disease. No middle ear or mastoid effusion.     BONES/SOFT TISSUES: No acute abnormality.     HEAD CTA:  ANTERIOR CIRCULATION: No stenosis/occlusion, aneurysm, or high flow vascular malformation. Standard [place] anatomy.     POSTERIOR CIRCULATION: No stenosis/occlusion, aneurysm, or high flow vascular malformation. Balanced vertebral arteries supply a normal basilar artery.      DURAL VENOUS SINUSES: Not well evaluated on a technical basis.     NECK CTA:  RIGHT CAROTID: No measurable stenosis or dissection.     LEFT CAROTID: No measurable stenosis or dissection.     VERTEBRAL ARTERIES: No focal stenosis or dissection. Balanced vertebral arteries. Variant origin of the right vertebral artery which arises from the proximal right common carotid artery.     AORTIC ARCH: 4 vessel arch anatomy with aberrant origin of the right subclavian artery from the distal aortic arch. No proximal arch vessel stenosis identified.     NONVASCULAR STRUCTURES: Unremarkable.     IMPRESSION:  HEAD CT:  1.  No acute intracranial process.     HEAD CTA:   1.  No significant stenosis, aneurysm, or high flow vascular malformation involving proximal [place] branches.     NECK CTA:  1.  No hemodynamically significant stenosis of the carotid circulation by NASCET criteria.  2.  No evidence for dissection.  3.  Variant anatomy of the aortic arch and variant origin of the right vertebral artery from the proximal right common carotid artery.           Physical Examination  Review of Systems   Vitals: 98/66, weight 210 pounds, heart rate of 68 and regular  Wt Readings from Last 3 Encounters:   05/02/23 99.8 kg (220 lb)   02/07/23 96.8 kg (213 lb 8 oz)   11/08/22 98 kg (216 lb)       General Appearance:   no distress,  normal body habitus   ENT/Mouth: membranes moist, no oral lesions or bleeding gums.      EYES:  no scleral icterus, normal conjunctivae   Neck: no carotid bruits or thyromegaly   Chest/Lungs:   lungs are clear to auscultation, no rales or wheezing, equal chest wall expansion    Cardiovascular:   Mildly distant regular. Normal first and second heart sounds with no murmurs, rubs, or gallops; the carotid, radial and posterior tibial pulses are intact, Jugular venous pressure within normal limits, no significant edema bilaterally    Abdomen:  no bruits, or tenderness; bowel sounds are present   Extremities: no cyanosis or clubbing   Skin: no xanthelasma, warm.    Neurologic: n no tremors     Psychiatric: alert and oriented x3, calm        Please refer above for cardiac ROS details.        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    patient denies    Arthritis     Asthma     Cellulitis     Left hand    Cerebral artery occlusion with cerebral infarction (H) 2021    Chest pain with normal coronary angiography 01/03/2018    Congestive heart failure (H)     Hypertension     Nonischemic cardiomyopathy (H) 01/10/2018    Janel Parkinson White pattern seen on electrocardiogram      Past Surgical History:   Procedure Laterality Date    ARTHROPLASTY HIP Right 2/7/2023    Procedure: RIGHT TOTAL HIP ARTHROPLASTY;  Surgeon: Agustín Virk MD;  Location: [facility] Main OR    OTHER SURGICAL HISTORY  2003    OTHER SURGICAL [facility]ORYCardiac [facility]     Family History   Problem Relation Age of Onset    Chronic Obstructive Pulmonary Disease Mother         Social History     Socioeconomic History    Marital status:      Spouse name: Not on file    Number of children: Not on file    Years of education: Not on file    Highest education level: Not on file    Occupational History    Not on file   Tobacco Use    Smoking status: Never     Passive exposure: Never    Smokeless tobacco: Former     Types: Chew   Substance and Sexual Activity    Alcohol use: Yes     Comment: Alcoholic Drinks/day: 2 drinks per month    Drug use: No    Sexual activity: Not on file   Other Topics Concern    Not on file   Social History Narrative    Not on file     Social Determinants of Health     Financial Resource Strain: Not on file   Food Insecurity: Not on file   Transportation Needs: Not on file   Physical Activity: Not on file   Stress: Not on file   Social Connections: Not on file   Intimate Partner Violence: Not on file   Housing Stability: Not on file           Medications  Allergies   Current Outpatient Medications   Medication Sig Dispense Refill    acetaminophen (TYLENOL) 500 MG tablet Take 2 tablets (1,000 mg) by mouth every 8 hours      ASMANEX  mcg/actuation HFAA Inhale 2 puffs into the lungs 2 times daily      atorvastatin (LIPITOR) 40 MG tablet Take 1 tablet (40 mg) by mouth daily 90 tablet 0    carvedilol (COREG) 12.5 MG tablet Take 1.5 tablets (18.75 mg) by mouth 2 times daily (with meals) 180 tablet 0    enalapril (VASOTEC) 10 MG tablet Take 1 tablet (10 mg) by mouth daily 90 tablet 0    ezetimibe (ZETIA) 10 MG tablet Take 1 tab (10 mg) by mouth, once daily. 90 tablet 0    furosemide (LASIX) 20 MG tablet Take 1 tablet (20 mg) by mouth daily 90 tablet 0    hydrOXYzine (ATARAX) 25 MG tablet Take 1 tablet (25 mg) by mouth every 6 hours as needed for other (adjuvant pain)      senna-docusate (SENOKOT-S/PERICOLACE) 8.6-50 MG tablet Take 1 tablet by mouth 2 times daily as needed for constipation      spironolactone (ALDACTONE) 25 MG tablet Take 1 tablet (25 mg) by mouth daily 90 tablet 3    VENTOLIN HFA 90 mcg/actuation inhaler Take 2 puffs by mouth every 4 hours as needed for shortness of breath or wheezing  0    warfarin ANTICOAGULANT (COUMADIN) 3 MG tablet Take 1-1.5  tablets (3-4.5 mg) by mouth daily Adjust dose per INR as directed by  tablet 1     No Known Allergies       Lab Results    Chemistry/lipid CBC Cardiac Enzymes/BNP/TSH/INR   Recent Labs   Lab Test 11/08/22  0858   CHOL 190   HDL 55   *   TRIG 139     Recent Labs   Lab Test 11/08/22  0858 03/16/22  0741 01/28/22  0836   * 100 111     Recent Labs   Lab Test 02/08/23  0659 02/07/23  1619   NA  --  136   POTASSIUM  --  4.2   CHLORIDE  --  104   CO2  --  25   * 160*   BUN  --  24*   CR  --  0.86   GFRESTIMATED  --  >90   ELLIS  --  8.7     Recent Labs   Lab Test 02/07/23  1619 11/30/22  0807 09/01/22  0902   CR 0.86 1.03 0.77     No results for input(s): A1C in the last [id number] hours.       Recent Labs   Lab Test 02/08/23  0659 02/07/23  1619   WBC  --  11.8*   HGB 12.9* 14.0   HCT  --  42.3   MCV  --  90    232     Recent Labs   Lab Test 02/08/23  0659 02/07/23  1619 03/20/21  0439   HGB 12.9* 14.0 16.4    Recent Labs   Lab Test 03/18/21  1544 03/17/21  1118 03/15/21  0832   TROPONINI 0.03 0.01 0.04     Recent Labs   Lab Test 11/08/22  0858 01/28/22  0846 09/30/21  0825 06/22/21  0843   BNP  --  49* 79* 18   NTBNP 156  --   --   --      Recent Labs   Lab Test 04/22/19  1924   TSH 1.96     Recent Labs   Lab Test 08/01/23  0706 05/18/23  0700 05/02/23  1042   INR 1.7* 2.4* 1.71*        Grover Oliveira MD      Thank you for allowing me to participate in the care of your patient.      Sincerely,     Grover Oliveira MD     [facility]  cc:   Grover Oliveira MD  [address]  [address]  [place] [address]

## 2023-08-23 NOTE — RESULT ENCOUNTER NOTE
Blood work looks good except for the mild increase in glucose.  He should discuss this with his primary care provider.  BNP looks good.  Await echocardiogram to comment on discontinuation of warfarin.

## 2023-08-23 NOTE — PATIENT INSTRUCTIONS
Nice to visit today.We are going to plan an echocardiogram and lab work and I will be in touch results.If you want to know more about where I will be after the first of the year send me a note towards the end of October otherwise a letter will come out making some suggestions as to new heart doctor.My nurse I Paradise and her number is [phone]

## 2023-08-23 NOTE — PROGRESS NOTES
HEART CARE ENCOUNTER CONSULTATON NOTE      [facility]  [phone]      Assessment/Recommendations   Assessment/Plan:  1.  History of heart failure with reduced ejection fraction, nonischemic cardiomyopathy possible prior episodes of myocarditis.  Echocardiogram most recently in January 2023 revealed persistent improvement and normalization of systolic function.  He continues to feel well without signs or symptoms failure.  He has been taking his medications as prescribed.  We reviewed the history and most recent echocardiogram results.  Plan to repeat an echocardiogram and then correspond with neurology, primary care regarding potential discontinuation of warfarin.    2.  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  Patient has been seen by Dr. Barrow in the past.  Notes regarding warfarin and potential discontinuation May 2022 reviewed.  As outlined above.    3.  Dyslipidemia.  He has been on atorvastatin postacute CVA.  Cholesterol results from March 2023 finds a cholesterol 159, triglycerides 144 with an LDL of 81.  He is on the combination of atorvastatin and Zetia and tolerating.  AST was normal March 2023 at 48.  Medications were not prescribed by neurology at that time of CVA.    Plan 1.  Laboratory studies today as outlined.  2.  Echocardiogram  3.  Follow-up in 6 months.       History of Present Illness/Subjective    HPI: Matthew W Behr is a 57 year old male who is seen in follow-up.  We last visited November 2022.  I had previously reviewed notes from Dr. Barrow from neurology May 2022 suggested that he be on warfarin for 1 more year at that time if echocardiogram looks stable at that point warfarin can be discontinued.  He suffered a cerebral infarction due to embolism of the right renal artery in March 2021 and at that time he had discontinued his cardiovascular medications and his ejection fraction felt 17%.  Most recent echocardiogram is from January 2023 at which time ejection fraction was  normal 55 to 60% without wall motion abnormality without significant valve abnormality.  MRI in  reported ejection fraction of 39%.  Small area of mid wall fibrosis which was typical of sequelae of prior episodes of myocarditis.  CT angiogram in  revealed a calcium score of 0 but no significant atherosclerotic plaque or significant stenosis.  He was seen in urgent care in 2023 for cough.    He reports feeling well.  He has an active job and has no specific complaints of chest discomfort, significant shortness of breath, dizziness or lightheadedness.  He has been tolerating his current combination of medications.  His blood pressure does trend to low normal but without significant dizziness.    Recent Echocardiogram Results:  Echocardiogram Complete  Order: [id number]  Status: Edited Result - FINAL     Visible to patient: Yes (not seen)     Next appt: 2023 at 07:50 AM in Cardiology (Grover Oliveira MD)     Dx: Nonischemic cardiomyopathy (H)     3 Result Notes  Details    Reading Physician Reading Date Result Priority   Balaji Franco DO  [phone] 2023      Narrative & Impression  [id number]  [id number]  [id number]^SELVIN^GROVER^[place] [address]     Name: BEHR, MATTHEW W  MRN: [id number]  : 1965  Study Date: 2023 07:58 AM  Age: 57 yrs  Gender: Male  Patient Location: [facility]  Reason For Study: Nonischemic cardiomyopathy (H)  Ordering Physician: GROVER OLIVEIRA  Referring Physician: GROVER OLIVEIRA  Performed By: ACE     BSA: 2.1 m2  Height: 69 in  Weight: 216 lb  HR: 52  BP: 128/67 mmHg  ______________________________________________________________________________  Procedure  Complete Echo Adult. Definity (NDC #70943-006) given intravenously. 2mL given.  ______________________________________________________________________________  Interpretation Summary     1. The left ventricle is normal in size. Left ventricular  function is  normal.The ejection fraction is 55-60%. Left ventricular diastolic function is  normal. No regional wall motion abnormalities noted.  2. Normal right ventricle size and systolic function.  3. Normal left atrial size.  4. No hemodynamically significant valvular abnormalities on 2D or color flow  imaging.  ______________________________________________________________________________  Left Ventricle  The left ventricle is normal in size. Left ventricular function is normal.The  ejection fraction is 55-60%. There is normal left ventricular wall thickness.  Left ventricular diastolic function is normal. No regional wall motion  abnormalities noted.     Right Ventricle  Normal right ventricle size and systolic function.     Atria  Normal left atrial size. Right atrial size is normal. There is no color  Doppler evidence of an atrial shunt.     Mitral Valve  Mitral valve leaflets appear normal. There is no evidence of mitral stenosis  or clinically significant mitral regurgitation.     Tricuspid Valve  Tricuspid valve leaflets appear normal. There is no evidence of tricuspid  stenosis or clinically significant tricuspid regurgitation. Right ventricular  systolic pressure could not be approximated due to inadequate tricuspid  regurgitation.     Aortic Valve  The aortic valve is trileaflet. Aortic valve leaflets appear normal. There is  no evidence of aortic stenosis or clinically significant aortic regurgitation.     Pulmonic Valve  The pulmonic valve is not well seen, but is grossly normal. This degree of  valvular regurgitation is within normal limits. There is trace pulmonic  valvular regurgitation.     Vessels  The aorta root is normal. Normal size ascending aorta. IVC diameter <2.1 cm  collapsing >50% with sniff suggests a normal RA pressure of 3 mmHg.     Pericardium  There is no pericardial effusion.            Recent Coronary Angiogram Results:    Electronically signed by Maxime Hernandez 2021-Jul-23  14:19:47     VITALS   ==========================================================================================================     HEIGHT: 69.02 in    (175.30 cm)  WEIGHT: 191.01 lbs    (86.64 kgs)  BSA: 2.03 m^2     SUMMARY   ==========================================================================================================     1.  Normal left ventricular size and wall thickness. Systolic function is mildly reduced with global  hypokinesis. The quantified left ventricular ejection fraction is 43%.   2.  Normal right ventricular size. Systolic function is mildly reduced. The quantified right ventricular  ejection fraction is 39%.   3.  There is a small area of midwall fibrosis in the basal inferolateral wall, in a nonischemic pattern.  The location and appearance is typical of the sequelae of a prior episode of myocarditis.  4.  No significant valvular abnormalities.      EXAM: CTA HEAD AND NECK  LOCATION: [facility]  DATE/TIME: 3/18/2021 4:22 PM     INDICATION: Stroke code. Left-sided facial droop and left-sided weakness.  COMPARISON: None.  CONTRAST: Iohexol (Omni) 75mL  TECHNIQUE: Head and neck CT angiogram with IV contrast. Noncontrast head CT followed by axial helical CT images of the head and neck vessels obtained during the arterial phase of intravenous contrast administration. Axial 2D reconstructed images and   multiplanar 3D MIP reconstructed images of the head and neck vessels were performed by the technologist. Dose reduction techniques were used. All stenosis measurements made according to NASCET criteria unless otherwise specified.     FINDINGS:   NONCONTRAST HEAD CT:   INTRACRANIAL CONTENTS: No intracranial hemorrhage, extraaxial collection, or mass effect.  No CT evidence of acute infarct. Normal parenchymal attenuation. Normal ventricles and sulci.      VISUALIZED ORBITS/SINUSES/MASTOIDS: No intraorbital abnormality. No paranasal sinus mucosal disease. No middle ear  or mastoid effusion.     BONES/SOFT TISSUES: No acute abnormality.     HEAD CTA:  ANTERIOR CIRCULATION: No stenosis/occlusion, aneurysm, or high flow vascular malformation. Standard [place] anatomy.     POSTERIOR CIRCULATION: No stenosis/occlusion, aneurysm, or high flow vascular malformation. Balanced vertebral arteries supply a normal basilar artery.      DURAL VENOUS SINUSES: Not well evaluated on a technical basis.     NECK CTA:  RIGHT CAROTID: No measurable stenosis or dissection.     LEFT CAROTID: No measurable stenosis or dissection.     VERTEBRAL ARTERIES: No focal stenosis or dissection. Balanced vertebral arteries. Variant origin of the right vertebral artery which arises from the proximal right common carotid artery.     AORTIC ARCH: 4 vessel arch anatomy with aberrant origin of the right subclavian artery from the distal aortic arch. No proximal arch vessel stenosis identified.     NONVASCULAR STRUCTURES: Unremarkable.     IMPRESSION:  HEAD CT:  1.  No acute intracranial process.     HEAD CTA:   1.  No significant stenosis, aneurysm, or high flow vascular malformation involving proximal [place] branches.     NECK CTA:  1.  No hemodynamically significant stenosis of the carotid circulation by NASCET criteria.  2.  No evidence for dissection.  3.  Variant anatomy of the aortic arch and variant origin of the right vertebral artery from the proximal right common carotid artery.           Physical Examination  Review of Systems   Vitals: 98/66, weight 210 pounds, heart rate of 68 and regular  Wt Readings from Last 3 Encounters:   05/02/23 99.8 kg (220 lb)   02/07/23 96.8 kg (213 lb 8 oz)   11/08/22 98 kg (216 lb)       General Appearance:   no distress, normal body habitus   ENT/Mouth: membranes moist, no oral lesions or bleeding gums.      EYES:  no scleral icterus, normal conjunctivae   Neck: no carotid bruits or thyromegaly   Chest/Lungs:   lungs are clear to auscultation, no rales or  wheezing, equal chest wall expansion    Cardiovascular:   Mildly distant regular. Normal first and second heart sounds with no murmurs, rubs, or gallops; the carotid, radial and posterior tibial pulses are intact, Jugular venous pressure within normal limits, no significant edema bilaterally    Abdomen:  no bruits, or tenderness; bowel sounds are present   Extremities: no cyanosis or clubbing   Skin: no xanthelasma, warm.    Neurologic: n no tremors     Psychiatric: alert and oriented x3, calm        Please refer above for cardiac ROS details.        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    patient denies    Arthritis     Asthma     Cellulitis     Left hand    Cerebral artery occlusion with cerebral infarction (H) 2021    Chest pain with normal coronary angiography 01/03/2018    Congestive heart failure (H)     Hypertension     Nonischemic cardiomyopathy (H) 01/10/2018    Janel Parkinson White pattern seen on electrocardiogram      Past Surgical History:   Procedure Laterality Date    ARTHROPLASTY HIP Right 2/7/2023    Procedure: RIGHT TOTAL HIP ARTHROPLASTY;  Surgeon: Agustín Virk MD;  Location: [facility] OR    OTHER SURGICAL HISTORY  2003    OTHER SURGICAL [facility]ORYCard[facility]     Family History   Problem Relation Age of Onset    Chronic Obstructive Pulmonary Disease Mother         Social History     Socioeconomic History    Marital status:      Spouse name: Not on file    Number of children: Not on file    Years of education: Not on file    Highest education level: Not on file   Occupational History    Not on file   Tobacco Use    Smoking status: Never     Passive exposure: Never    Smokeless tobacco: Former     Types: Chew   Substance and Sexual Activity    Alcohol use: Yes     Comment: Alcoholic Drinks/day: 2  drinks per month    Drug use: No    Sexual activity: Not on file   Other Topics Concern    Not on file   Social History Narrative    Not on file     Social Determinants of Health     Financial Resource Strain: Not on file   Food Insecurity: Not on file   Transportation Needs: Not on file   Physical Activity: Not on file   Stress: Not on file   Social Connections: Not on file   Intimate Partner Violence: Not on file   Housing Stability: Not on file           Medications  Allergies   Current Outpatient Medications   Medication Sig Dispense Refill    acetaminophen (TYLENOL) 500 MG tablet Take 2 tablets (1,000 mg) by mouth every 8 hours      ASMANEX  mcg/actuation HFAA Inhale 2 puffs into the lungs 2 times daily      atorvastatin (LIPITOR) 40 MG tablet Take 1 tablet (40 mg) by mouth daily 90 tablet 0    carvedilol (COREG) 12.5 MG tablet Take 1.5 tablets (18.75 mg) by mouth 2 times daily (with meals) 180 tablet 0    enalapril (VASOTEC) 10 MG tablet Take 1 tablet (10 mg) by mouth daily 90 tablet 0    ezetimibe (ZETIA) 10 MG tablet Take 1 tab (10 mg) by mouth, once daily. 90 tablet 0    furosemide (LASIX) 20 MG tablet Take 1 tablet (20 mg) by mouth daily 90 tablet 0    hydrOXYzine (ATARAX) 25 MG tablet Take 1 tablet (25 mg) by mouth every 6 hours as needed for other (adjuvant pain)      senna-docusate (SENOKOT-S/PERICOLACE) 8.6-50 MG tablet Take 1 tablet by mouth 2 times daily as needed for constipation      spironolactone (ALDACTONE) 25 MG tablet Take 1 tablet (25 mg) by mouth daily 90 tablet 3    VENTOLIN HFA 90 mcg/actuation inhaler Take 2 puffs by mouth every 4 hours as needed for shortness of breath or wheezing  0    warfarin ANTICOAGULANT (COUMADIN) 3 MG tablet Take 1-1.5 tablets (3-4.5 mg) by mouth daily Adjust dose per INR as directed by  tablet 1     No Known Allergies       Lab Results    Chemistry/lipid CBC Cardiac Enzymes/BNP/TSH/INR   Recent Labs   Lab Test 11/08/22  0858   CHOL 190   HDL 55    *   TRIG 139     Recent Labs   Lab Test 11/08/22  0858 03/16/22  0741 01/28/22  0836   * 100 111     Recent Labs   Lab Test 02/08/23  0659 02/07/23  1619   NA  --  136   POTASSIUM  --  4.2   CHLORIDE  --  104   CO2  --  25   * 160*   BUN  --  24*   CR  --  0.86   GFRESTIMATED  --  >90   ELLIS  --  8.7     Recent Labs   Lab Test 02/07/23  1619 11/30/22  0807 09/01/22  0902   CR 0.86 1.03 0.77     No results for input(s): A1C in the last 11003 hours.       Recent Labs   Lab Test 02/08/23  0659 02/07/23  1619   WBC  --  11.8*   HGB 12.9* 14.0   HCT  --  42.3   MCV  --  90    232     Recent Labs   Lab Test 02/08/23  0659 02/07/23  1619 03/20/21  0439   HGB 12.9* 14.0 16.4    Recent Labs   Lab Test 03/18/21  1544 03/17/21  1118 03/15/21  0832   TROPONINI 0.03 0.01 0.04     Recent Labs   Lab Test 11/08/22  0858 01/28/22  0846 09/30/21  0825 06/22/21  0843   BNP  --  49* 79* 18   NTBNP 156  --   --   --      Recent Labs   Lab Test 04/22/19  1924   TSH 1.96     Recent Labs   Lab Test 08/01/23  0706 05/18/23  0700 05/02/23  1042   INR 1.7* 2.4* 1.71*        Grover Oliveira MD

## 2023-08-24 ENCOUNTER — TELEPHONE (OUTPATIENT)
Dept: ANTICOAGULATION | Facility: CLINIC | Age: 58
End: 2023-08-24
Payer: COMMERCIAL

## 2023-08-24 NOTE — TELEPHONE ENCOUNTER
ANTICOAGULATION     Matthew W Behr is overdue for INR check.   INR was not checked at yesterday's office visit with Dr. Oliveira     Left message for patient to call and schedule lab appointment as soon as possible. If returning call, please schedule.     Sharmin Thompson RN

## 2023-09-06 NOTE — DISCHARGE SUMMARY
[facility] Discharge Summary                                       LAUREN HOLLIDAY BEHR [id number]   Age: 57 year old  PCP: Segundo Kaufman, [phone] 1965     Date of Admission:  2/7/2023  Date of Discharge::  2/8/2023  Discharge Physician:  Sekou Valero PA-C    Code status:  Full Code    Admission Information:  Admission Diagnosis:  Osteoarthritis of right hip [M16.11]  Primary osteoarthritis of right hip [M16.11]    Post-Operative Day: 1 Day Post-Op     Reason for admission:  The patient was admitted for the following:Procedure(s) (LRB):  RIGHT TOTAL HIP ARTHROPLASTY (Right)    Active Problems:    * No active hospital problems. *      Allergies:  Patient has no known allergies.    Following the procedure noted above the patient was transferred to the post-op floor and started on:    Therapy:  physical therapy and occupational therapy  Anticoagulation Plan: Lovenox inpatient then resume home warfarin upon discharge   Pain Management: scopainmedication: oxycodone and tylenol  Weight bearing status: Weight bearing as tolerated     The patient was followed and co-managed by the hospitalist service during the inpatient treatment course  Complications:  None  Consultations:  Medicine     Pertinent Labs   Lab Results: personally reviewed.     Recent Labs   Lab Test 02/08/23  0659 02/07/23  1619 02/07/23  1018 11/30/22  0807 11/08/22  0859 09/01/22  0902 01/28/22  0846 01/28/22  0836 03/21/21  0451 03/20/21  0439 03/19/21  0420   INR  --   --  0.98  --  1.9*  --   --  1.4*   < >  --   --    HGB 12.9* 14.0  --   --   --   --   --   --   --  16.4 17.2   HCT  --  42.3  --   --   --   --   --   --   --  47.8 50.6   MCV  --  90  --   --   --   --   --   --   --  88 87    232  --   --   --   --   --   --   --  347 388   NA  --  136  --  140  --  140   < >  --    < > 135* 138    < > = values in this interval not displayed.          Discharge Information:  Condition at discharge:  Stable  Discharge destination:  Discharged to home     Medications at discharge:  Current Discharge Medication List      START taking these medications    Details   acetaminophen (TYLENOL) 500 MG tablet Take 2 tablets (1,000 mg) by mouth every 8 hours    Associated Diagnoses: Primary osteoarthritis of right hip      hydrOXYzine (ATARAX) 25 MG tablet Take 1 tablet (25 mg) by mouth every 6 hours as needed for other (adjuvant pain)    Associated Diagnoses: Primary osteoarthritis of right hip      oxyCODONE (ROXICODONE) 5 MG tablet Take 1-2 tablets (5-10 mg) by mouth every 4 hours as needed for moderate to severe pain  Refills: 0    Associated Diagnoses: Primary osteoarthritis of right hip      senna-docusate (SENOKOT-S/PERICOLACE) 8.6-50 MG tablet Take 1 tablet by mouth 2 times daily as needed for constipation    Associated Diagnoses: Primary osteoarthritis of right hip         CONTINUE these medications which have NOT CHANGED    Details   ASMANEX  mcg/actuation HFAA Inhale 2 puffs into the lungs 2 times daily      atorvastatin (LIPITOR) 40 MG tablet Take 1 tablet (40 mg) by mouth daily  Qty: 90 tablet, Refills: 3    Associated Diagnoses: Cerebral infarction due to embolism of right middle cerebral artery (H)      carvedilol (COREG) 12.5 MG tablet Take 1.5 tablets (18.75 mg) by mouth 2 times daily (with meals)  Qty: 180 tablet, Refills: 3    Associated Diagnoses: Nonischemic cardiomyopathy (H)      enalapril (VASOTEC) 10 MG tablet Take 1 tablet (10 mg) by mouth daily  Qty: 90 tablet, Refills: 3    Associated Diagnoses: Nonischemic cardiomyopathy (H)      enoxaparin ANTICOAGULANT (LOVENOX) 100 MG/ML syringe Inject 95 mg Subcutaneous every 12 hours Bridging before surgery when warfarin stopped      ezetimibe (ZETIA) 10 MG tablet Take 1 tablet (10 mg) by mouth daily  Qty: 90 tablet, Refills: 1    Associated Diagnoses: History of CVA (cerebrovascular accident); Nonischemic cardiomyopathy (H)      furosemide (LASIX)  20 MG tablet Take 1 tablet (20 mg) by mouth daily  Qty: 90 tablet, Refills: 3    Associated Diagnoses: Acute decompensated heart failure (H)      spironolactone (ALDACTONE) 25 MG tablet Take 1 tablet (25 mg) by mouth daily  Qty: 90 tablet, Refills: 3    Associated Diagnoses: Acute decompensated heart failure (H)      VENTOLIN HFA 90 mcg/actuation inhaler Take 2 puffs by mouth every 4 hours as needed for shortness of breath or wheezing  Refills: 0      warfarin ANTICOAGULANT (COUMADIN/JANTOVEN) 3 MG tablet [WARFARIN ANTICOAGULANT (COUMADIN/JANTOVEN) 3 MG TABLET] Take 1 tablets (3 mg) by mouth daily. Adjust dose based on INR results as directed.  Qty: 30 tablet, Refills: 0    Associated Diagnoses: Cerebral infarction due to embolism of right middle cerebral artery (H)                        Follow-Up Care:  Patient should be seen in a [facility] office in 10-14 days by the patient's Orthopedic Surgeon/Physician Assistant.  Call [phone] for appointment or questions.    Sekou Valero PA-C     Left Ventricle: Left ventricle size is normal. Normal wall thickness. Global hypokinesis present. Reduced left ventricular systolic function with a visually estimated EF of 40 - 45%. Aortic Valve: Mild sclerosis of the aortic valve cusp. Mitral Valve: Mild annular calcification of the mitral valve. Mild transvalvular regurgitation. Left Atrium: Left atrium is dilated.       [facility] Reference Info:                                                                                                                  Past Medical History:   Diagnosis Date    Abnormal mammogram     Acute chest syndrome ([address]) 2016    Asbestosis ([address]) 2016    Asthma     Carotid artery disease (HCC)     Carotid artery stenosis without cerebral infarction 2016    CHF (congestive heart failure) (HCC)     Chronic hoarseness     Chronic obstructive pulmonary disease (HCC)     Chronic renal disease, stage III [facility]) [[id number]] 8/15/2023    Colitis, ulcerative (HCC)     Colitis, ulcerative (HCC)     Colon cancer ([address])     x2    Compression fx, lumbar spine ([address])     she had 4, 1 from bending over and the other 2 from falling    Coronary artery disease involving native coronary artery of native heart with angina pectoris ([address]) 11/3/2022    Depression 2015    Dyslipidemia 2016    Elevated LFTs     GERD (gastroesophageal reflux disease) 2015    GERD & CROHNS & hx of colon cancer    Heart failure ([address])     High cholesterol 2015    Hyperlipidemia 2016    Hypertriglyceridemia     Hypokalemia     Hypomagnesemia     Lymphoma (HCC)     Menopause     Osteoarthrosis, unspecified whether generalized or localized, unspecified site 2015    Osteoporosis 2015    Stroke ([address])     TIA    Tobacco use disorder 2016        Tobacco Use      Smoking status: Former        Packs/day: 1.00        Years: 50.00        Pack years: 50        Types: Cigarettes        Quit date: 2018        Years since quittin.7

## 2023-09-10 DIAGNOSIS — I42.8 NONISCHEMIC CARDIOMYOPATHY (H): ICD-10-CM

## 2023-09-11 RX ORDER — CARVEDILOL 12.5 MG/1
18.75 TABLET ORAL 2 TIMES DAILY WITH MEALS
Qty: 180 TABLET | Refills: 3 | Status: SHIPPED | OUTPATIENT
Start: 2023-09-11 | End: 2024-03-11

## 2023-09-12 ENCOUNTER — DOCUMENTATION ONLY (OUTPATIENT)
Dept: ANTICOAGULATION | Facility: CLINIC | Age: 58
End: 2023-09-12
Payer: COMMERCIAL

## 2023-09-12 NOTE — PROGRESS NOTES
ANTICOAGULATION     Matthew W Behr is overdue for INR check.      Reminder letter sent    Karen Bird RN

## 2023-09-12 NOTE — LETTER
[facility]  [address] [facility] [address]  Phone: [phone]  Fax: [phone]   September 12, 2023        Matthew W Behr  [address]  [facility] [address]            Dear Antonio,    You are currently under the care of [facility] for your warfarin (Coumadin , Jantoven ) therapy.  We are contacting you because our records show you were due for an INR on 8/15/23.    There are potentially serious risks when taking warfarin without careful monitoring and we want to make sure you are safely managed.  Routine lab monitoring is required for warfarin refills.     Please call [phone] as soon as possible to schedule an appointment.  If there has been a change in your care or other concerns, please let us know so we can help and or update our records.     Sincerely,       [facility]

## 2023-09-15 ENCOUNTER — HOSPITAL ENCOUNTER (OUTPATIENT)
Dept: CARDIOLOGY | Facility: HOSPITAL | Age: 58
Discharge: HOME | End: 2023-09-15
Attending: INTERNAL MEDICINE | Admitting: INTERNAL MEDICINE
Payer: COMMERCIAL

## 2023-09-15 DIAGNOSIS — I50.20 HEART FAILURE WITH REDUCED EJECTION FRACTION (H): ICD-10-CM

## 2023-09-15 DIAGNOSIS — I42.8 NONISCHEMIC CARDIOMYOPATHY (H): ICD-10-CM

## 2023-09-15 DIAGNOSIS — I50.22 CHRONIC SYSTOLIC CONGESTIVE HEART FAILURE (H): ICD-10-CM

## 2023-09-15 LAB — LVEF ECHO: NORMAL

## 2023-09-15 PROCEDURE — 999N000208 ECHOCARDIOGRAM COMPLETE

## 2023-09-15 PROCEDURE — 255N000002 HC RX 255 OP 636: Performed by: INTERNAL MEDICINE

## 2023-09-15 PROCEDURE — 93306 TTE W/DOPPLER COMPLETE: CPT | Mod: 26 | Performed by: INTERNAL MEDICINE

## 2023-09-15 RX ADMIN — PERFLUTREN 3 ML: 6.52 INJECTION, SUSPENSION INTRAVENOUS at 08:30

## 2023-09-18 NOTE — RESULT ENCOUNTER NOTE
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 Thanks. Rolly Ghotra can we let Dwain know I will be in touch pending the above discussion.Thanks.

## 2023-09-18 NOTE — RESULT ENCOUNTER NOTE
Hello,  With ongoing improvement in EF it should be okay to discontinue Coumadin and switch him to enteric-coated aspirin 325 mg daily.  Thank you  Loco Barorw

## 2023-09-20 ENCOUNTER — TELEPHONE (OUTPATIENT)
Dept: CARDIOLOGY | Facility: CLINIC | Age: 58
End: 2023-09-20
Payer: COMMERCIAL

## 2023-09-20 DIAGNOSIS — I42.8 NONISCHEMIC CARDIOMYOPATHY (H): ICD-10-CM

## 2023-09-20 DIAGNOSIS — I50.22 CHRONIC SYSTOLIC CONGESTIVE HEART FAILURE (H): ICD-10-CM

## 2023-09-20 DIAGNOSIS — I63.411 CEREBRAL INFARCTION DUE TO EMBOLISM OF RIGHT MIDDLE CEREBRAL ARTERY (H): Primary | ICD-10-CM

## 2023-09-21 RX ORDER — ASPIRIN 325 MG
325 TABLET ORAL DAILY
Qty: 90 TABLET | Refills: 3 | Status: SHIPPED | OUTPATIENT
Start: 2023-09-21

## 2023-09-21 NOTE — TELEPHONE ENCOUNTER
Left message for patient to schedule INR check.  He should stop Warfarin then have an INR checked about 2 days later since MD wants INR 1.5 or less before patient starts full dose Aspirin.  Calixto PLATT

## 2023-09-26 ENCOUNTER — DOCUMENTATION ONLY (OUTPATIENT)
Dept: ANTICOAGULATION | Facility: CLINIC | Age: 58
End: 2023-09-26
Payer: COMMERCIAL

## 2023-09-26 NOTE — PROGRESS NOTES
ANTICOAGULATION  MANAGEMENT    Matthew W Behr is being discharged from the [facility] Anticoagulation Management Program ([facility]).    Reason for discharge: warfarin replaced by alternate therapy, 325 mg ASA    Anticoagulation episode resolved, ACC referral closed, and Standing order discontinued    If patient needs warfarin management in the future, please send a new referral    Karen Bird RN

## 2023-10-10 DIAGNOSIS — I42.8 NONISCHEMIC CARDIOMYOPATHY (H): ICD-10-CM

## 2023-10-10 DIAGNOSIS — I50.9 ACUTE DECOMPENSATED HEART FAILURE (H): ICD-10-CM

## 2023-10-10 DIAGNOSIS — I63.411 CEREBRAL INFARCTION DUE TO EMBOLISM OF RIGHT MIDDLE CEREBRAL ARTERY (H): ICD-10-CM

## 2023-10-10 RX ORDER — FUROSEMIDE 20 MG
20 TABLET ORAL DAILY
Qty: 90 TABLET | Refills: 3 | Status: SHIPPED | OUTPATIENT
Start: 2023-10-10

## 2023-10-13 RX ORDER — ATORVASTATIN CALCIUM 40 MG/1
40 TABLET, FILM COATED ORAL DAILY
Qty: 90 TABLET | Refills: 0 | Status: SHIPPED | OUTPATIENT
Start: 2023-10-13 | End: 2024-01-17

## 2023-10-13 RX ORDER — ENALAPRIL MALEATE 10 MG/1
10 TABLET ORAL DAILY
Qty: 90 TABLET | Refills: 0 | Status: SHIPPED | OUTPATIENT
Start: 2023-10-13 | End: 2024-01-17

## 2023-12-14 DIAGNOSIS — I42.8 NONISCHEMIC CARDIOMYOPATHY (H): ICD-10-CM

## 2023-12-14 DIAGNOSIS — Z86.73 HISTORY OF CVA (CEREBROVASCULAR ACCIDENT): ICD-10-CM

## 2023-12-14 RX ORDER — EZETIMIBE 10 MG/1
TABLET ORAL
Qty: 90 TABLET | Refills: 0 | Status: SHIPPED | OUTPATIENT
Start: 2023-12-14 | End: 2024-03-11

## 2023-12-24 ENCOUNTER — ANCILLARY PROCEDURE (OUTPATIENT)
Dept: GENERAL RADIOLOGY | Facility: CLINIC | Age: 58
End: 2023-12-24
Attending: EMERGENCY MEDICINE
Payer: COMMERCIAL

## 2023-12-24 ENCOUNTER — OFFICE VISIT (OUTPATIENT)
Dept: URGENT CARE | Facility: URGENT CARE | Age: 58
End: 2023-12-24
Payer: COMMERCIAL

## 2023-12-24 VITALS
OXYGEN SATURATION: 95 % | TEMPERATURE: 98.9 F | HEIGHT: 69 IN | HEART RATE: 74 BPM | DIASTOLIC BLOOD PRESSURE: 72 MMHG | SYSTOLIC BLOOD PRESSURE: 112 MMHG | BODY MASS INDEX: 31.25 KG/M2 | WEIGHT: 211 LBS

## 2023-12-24 DIAGNOSIS — J45.41 MODERATE PERSISTENT ASTHMA WITH ACUTE EXACERBATION: ICD-10-CM

## 2023-12-24 DIAGNOSIS — J06.9 UPPER RESPIRATORY TRACT INFECTION, UNSPECIFIED TYPE: ICD-10-CM

## 2023-12-24 DIAGNOSIS — J45.41 MODERATE PERSISTENT ASTHMA WITH ACUTE EXACERBATION: Primary | ICD-10-CM

## 2023-12-24 PROCEDURE — 94640 AIRWAY INHALATION TREATMENT: CPT | Performed by: EMERGENCY MEDICINE

## 2023-12-24 PROCEDURE — 99214 OFFICE O/P EST MOD 30 MIN: CPT | Mod: 25 | Performed by: EMERGENCY MEDICINE

## 2023-12-24 PROCEDURE — 71046 X-RAY EXAM CHEST 2 VIEWS: CPT | Mod: TC | Performed by: RADIOLOGY

## 2023-12-24 RX ORDER — PREDNISONE 20 MG/1
40 TABLET ORAL DAILY
Qty: 10 TABLET | Refills: 0 | Status: SHIPPED | OUTPATIENT
Start: 2023-12-24 | End: 2023-12-29

## 2023-12-24 RX ORDER — IPRATROPIUM BROMIDE AND ALBUTEROL SULFATE 2.5; .5 MG/3ML; MG/3ML
3 SOLUTION RESPIRATORY (INHALATION) ONCE
Status: COMPLETED | OUTPATIENT
Start: 2023-12-24 | End: 2023-12-24

## 2023-12-24 RX ADMIN — IPRATROPIUM BROMIDE AND ALBUTEROL SULFATE 3 ML: 2.5; .5 SOLUTION RESPIRATORY (INHALATION) at 12:16

## 2023-12-24 NOTE — PROGRESS NOTES
Assessment & Plan     Diagnosis:    ICD-10-CM    1. Moderate persistent asthma with acute exacerbation  J45.41 XR Chest 2 Views     ipratropium - albuterol 0.5 mg/2.5 mg/3 mL (DUONEB) neb solution 3 mL     predniSONE (DELTASONE) 20 MG tablet     CANCELED: XR Chest 2 Views      2. Upper respiratory tract infection, unspecified type  J06.9           Medical Decision Making:  Matthew W Behr is a 58 year old male who presents for evaluation of shortness of breath and wheezing, consistent with known reactive airway disease.  Signs and symptoms are consistent with asthma exacerbation.  A broad differential was considered including inhaled foreign body, asthma, pneumonia, bronchitis, COPD, pneumothorax, cardiac equivalent, viral induced wheezing, allergic phenomena, etc.  The patient was treated with a Duoneb here and feels improved after. CXR obtained and is negative for signs of pneumonia or effusion on my read. Radiology notes as per below.    The patient has not had multiple recent ED visits for these symptoms, has not had recent steroid burst, no recent asthma related admission, and has no history of intubation for asthma.  No indication for hospitalization at this time including no hypoxia, no marked increase in respiratory rate, no retractions.  Supportive outpatient management is indicated, medications for discharge noted above.  Close followup with primary care physician.  Go to the ER if increased wheezing, progressive shortness of breath, develops fever greater than 102 F or other concerns.        Zackary Marcano PA-C  [facility]    Subjective     Matthew W Behr is a 58 year old male who presents to clinic today for the following health issues:  Chief Complaint   Patient presents with    Urgent Care    Respiratory Problems     Pt in clinic to have eval for SOB, cough and wheezing.       HPI  Patient with history of asthma and CHF, notes over the last 5 days she has had a cough, slightly  "productive.  He is having some wheezing issues, using his inhalers with little improvement.  Has not tried nebulizer at home yet.  Denies any difficulties breathing at rest or with minimal exertion, chest pain, leg swelling, fevers, recent steroid burst or hospitalization for asthma.        Review of Systems    See HPI    Objective      Vitals: /72   Pulse 74   Temp 98.9  F (37.2  C) (Oral)   Ht 1.753 m (5' 9\")   Wt 95.7 kg (211 lb)   SpO2 95%   BMI 31.16 kg/m    Resp: 16    Patient Vitals for the past 24 hrs:   BP Temp Temp src Pulse SpO2 Height Weight   12/24/23 1205 112/72 98.9  F (37.2  C) Oral 74 95 % 1.753 m (5' 9\") 95.7 kg (211 lb)       Vital signs reviewed by: Zackary Marcano PA-C    Physical Exam   Constitutional: Patient is alert and cooperative. No acute distress.  Mouth: Mucous membranes are moist. Normal tongue and tonsil. Posterior oropharynx is clear.  Cardiovascular: Regular rate and rhythm.  No pedal edema.  Pulmonary/Chest: Expiratory wheezes in the upper lung fields, slightly diminished breath sounds at the bases.  No Rales or rhonchi.  Effort normal.  No retractions.  Speaking full sentences, no respiratory distress.  Neurological: Alert and oriented x3.  Skin: No rash noted on visualized skin.  Psychiatric:The patient has a normal mood and affect.     Labs/Imaging:  Results for orders placed or performed in visit on 12/24/23   XR Chest 2 Views     Status: None    Narrative    EXAM: XR CHEST 2 VIEWS  LOCATION: [facility]  DATE: 12/24/2023    INDICATION:  Moderate persistent asthma with acute exacerbation  COMPARISON: 11/30/2022      Impression    IMPRESSION: Negative chest.       Interventions:  Duoneb 3mL Nebulization    Zackary Marcano PA-C, December 24, 2023      "

## 2024-01-17 DIAGNOSIS — I63.411 CEREBRAL INFARCTION DUE TO EMBOLISM OF RIGHT MIDDLE CEREBRAL ARTERY (H): ICD-10-CM

## 2024-01-17 DIAGNOSIS — I42.8 NONISCHEMIC CARDIOMYOPATHY (H): ICD-10-CM

## 2024-01-17 RX ORDER — ENALAPRIL MALEATE 10 MG/1
10 TABLET ORAL DAILY
Qty: 90 TABLET | Refills: 0 | Status: SHIPPED | OUTPATIENT
Start: 2024-01-17 | End: 2024-03-11

## 2024-01-17 RX ORDER — ATORVASTATIN CALCIUM 40 MG/1
40 TABLET, FILM COATED ORAL DAILY
Qty: 90 TABLET | Refills: 0 | Status: SHIPPED | OUTPATIENT
Start: 2024-01-17 | End: 2024-03-11

## 2024-03-11 ENCOUNTER — OFFICE VISIT (OUTPATIENT)
Dept: CARDIOLOGY | Facility: CLINIC | Age: 59
End: 2024-03-11
Payer: COMMERCIAL

## 2024-03-11 VITALS
HEART RATE: 71 BPM | DIASTOLIC BLOOD PRESSURE: 62 MMHG | HEIGHT: 69 IN | BODY MASS INDEX: 31.25 KG/M2 | WEIGHT: 211 LBS | SYSTOLIC BLOOD PRESSURE: 90 MMHG | RESPIRATION RATE: 14 BRPM

## 2024-03-11 DIAGNOSIS — I50.9 ACUTE DECOMPENSATED HEART FAILURE (H): ICD-10-CM

## 2024-03-11 DIAGNOSIS — R06.09 DYSPNEA ON EXERTION: ICD-10-CM

## 2024-03-11 DIAGNOSIS — I50.22 CHRONIC SYSTOLIC CONGESTIVE HEART FAILURE (H): ICD-10-CM

## 2024-03-11 DIAGNOSIS — I50.20 HEART FAILURE WITH REDUCED EJECTION FRACTION (H): ICD-10-CM

## 2024-03-11 DIAGNOSIS — I42.8 NONISCHEMIC CARDIOMYOPATHY (H): Primary | ICD-10-CM

## 2024-03-11 DIAGNOSIS — I63.411 CEREBRAL INFARCTION DUE TO EMBOLISM OF RIGHT MIDDLE CEREBRAL ARTERY (H): ICD-10-CM

## 2024-03-11 DIAGNOSIS — I50.42 CHRONIC COMBINED SYSTOLIC AND DIASTOLIC HEART FAILURE (H): ICD-10-CM

## 2024-03-11 PROCEDURE — 99214 OFFICE O/P EST MOD 30 MIN: CPT | Performed by: INTERNAL MEDICINE

## 2024-03-11 RX ORDER — CARVEDILOL 12.5 MG/1
18.75 TABLET ORAL 2 TIMES DAILY WITH MEALS
Qty: 180 TABLET | Refills: 3 | Status: SHIPPED | OUTPATIENT
Start: 2024-03-11

## 2024-03-11 RX ORDER — ENALAPRIL MALEATE 10 MG/1
10 TABLET ORAL DAILY
Qty: 90 TABLET | Refills: 3 | Status: SHIPPED | OUTPATIENT
Start: 2024-03-11

## 2024-03-11 RX ORDER — SPIRONOLACTONE 25 MG/1
25 TABLET ORAL DAILY
Qty: 90 TABLET | Refills: 3 | Status: SHIPPED | OUTPATIENT
Start: 2024-03-11

## 2024-03-11 RX ORDER — ATORVASTATIN CALCIUM 40 MG/1
40 TABLET, FILM COATED ORAL DAILY
Qty: 90 TABLET | Refills: 3 | Status: SHIPPED | OUTPATIENT
Start: 2024-03-11

## 2024-03-11 NOTE — PROGRESS NOTES
[facility]   [address]  [place] [address]   [email or web address]   OFFICE: [phone]     CARDIOLOGY CLINIC NOTE     Thank you, Dr. Ceja, [facility], for asking the [facility] team to see Mr. Matthew W Behr to  Follow Up (cardiomyopathy)       Assessment/Recommendations   Assessment:    Chronic Heart failure with recovered LVEF - does not have overt signs of fluid overload but is now complaining of dyspnea on exertion with climbing one flight of stairs. Will evaluate possible cardiac cause with an MRI, but if this is stable then would recommend pursuing pulmonary etiology of his symptoms.  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  Dyslipidemia - currently on ezetimibe and atorvastatin.    Plan:  Cardiac MRI with stress to evaluate LUQUE  Can discontinue ezetimibe  Continue aspirin, spironolactone, carvedilol, enalapril without changes  Can take furosemide as needed to maintain net neutral fluid balance.  Follow up in a year or sooner if needed and based on MRI results.         History of Present Illness   Mr. Matthew W Behr is a 58 year old male who presents for follow-up.    Mr. Behr has a history of systolic heart failure due to prior myocarditis causing a non-ischemic cardiomyopathy in 2021. He has since had recovery of his LVEF from 17% to 50-55%. 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 He was treated with warfarin for several years, but has since discontinued as his LV function has returned to near-normal and has shown stability.    Currently, Dwain is feeling generally well, but he reports some new LUQUE that occurs with climbing a flight of stairs. This is new compared to 6 months ago. No LE swelling, normal appetite, no change in waist line.  Weight stable.    Other than noted above, Mr. Behr denies any chest pain/pressure/tightness, shortness of breath at rest or with exertion, light headedness/dizziness, pre-syncope, syncope, lower extremity swelling, palpitations, paroxysmal nocturnal dyspnea (PND), or orthopnea.     Cardiac Problems and Cardiac Diagnostics     Most Recent Cardiac testing:  ECG dated 1/26/23 (personaly reviewed and interpreted): sinus bradycardia (52 bpm), RBBB    ECHO (report reviewed):   TTE 9/15/23  1.Left ventricular function is decreased. The ejection fraction is 50-55% (borderline).  2.Normal right ventricle size and systolic function.  3.The left atrium is mildly dilated.  4.No hemodynamically significant valvular abnormalities on 2D or color flow imaging.  Compared to the prior study dated 1/9/2023, there have been no changes.    Cardiac MRI 7/22/21  1.  Normal left ventricular size and wall thickness. Systolic function is mildly reduced with global  hypokinesis. The quantified left ventricular ejection fraction is 43%.   2.  Normal right ventricular size. Systolic function is mildly reduced. The quantified right ventricular  ejection fraction is 39%.   3.  There is a small area of midwall fibrosis in the basal inferolateral wall, in a nonischemic pattern.  The location and appearance is typical of the sequelae of a prior episode of myocarditis.  4.  No significant valvular abnormalities.    CT coronary angiogram 1/3/18  Narrative & Impression     The total Agatston calcium score is 0. A calcium score of zero places the individual in the lowest quartile when compared to an age and gender matched control group and implies a low risk of cardiac events in the next 10 years. (less than 1% per year).    No detectable atherosclerotic plaque or significant stenosis observed.    Significant misregistration artifact decreases ability to interpret the examination the findings are most consistent with artifact rather than obstructive  "artery disease.    Mild dilatation of the proximal aorta at the level of the sinuses of Valsalva    Please see radiology interpretation for additional findings.          Medications  Allergies   Current Outpatient Medications   Medication Sig Dispense Refill    ASMANEX  mcg/actuation HFAA Inhale 2 puffs into the lungs 2 times daily      aspirin (ASA) 325 MG tablet Take 1 tablet (325 mg) by mouth daily 90 tablet 3    atorvastatin (LIPITOR) 40 MG tablet Take 1 tablet (40 mg) by mouth daily 90 tablet 3    carvedilol (COREG) 12.5 MG tablet Take 1.5 tablets (18.75 mg) by mouth 2 times daily (with meals) 180 tablet 3    enalapril (VASOTEC) 10 MG tablet Take 1 tablet (10 mg) by mouth daily 90 tablet 3    furosemide (LASIX) 20 MG tablet TAKE 1 TABLET BY MOUTH EVERY DAY 90 tablet 3    hydrOXYzine (ATARAX) 25 MG tablet Take 1 tablet (25 mg) by mouth every 6 hours as needed for other (adjuvant pain)      spironolactone (ALDACTONE) 25 MG tablet Take 1 tablet (25 mg) by mouth daily 90 tablet 3    VENTOLIN HFA 90 mcg/actuation inhaler Take 2 puffs by mouth every 4 hours as needed for shortness of breath or wheezing  0      No Known Allergies     Physical Examination Review of Systems   Vitals: BP 90/62 (BP Location: Right arm, Patient Position: Sitting, Cuff Size: Adult Large)   Pulse 71   Resp 14   Ht 1.753 m (5' 9\")   Wt 95.7 kg (211 lb)   BMI 31.16 kg/m    BMI= Body mass index is 31.16 kg/m .  Wt Readings from Last 3 Encounters:   03/11/24 95.7 kg (211 lb)   12/24/23 95.7 kg (211 lb)   08/23/23 95.3 kg (210 lb)       General: pleasant male. No acute distress.   Neck: No JVD  Lungs: clear to auscultation  COR: normal rate, regular rhythm, No murmurs, rubs, or gallops  Extrem: No edema        Please refer above for cardiac ROS details.       Past History     Family History:   Family History   Problem Relation Age of Onset    Chronic Obstructive Pulmonary Disease Mother         Social History:   Social History "     Socioeconomic History    Marital status:      Spouse name: Not on file    Number of children: Not on file    Years of education: Not on file    Highest education level: Not on file   Occupational History    Not on file   Tobacco Use    Smoking status: Never     Passive exposure: Never    Smokeless tobacco: Former     Types: Chew   Substance and Sexual Activity    Alcohol use: Yes     Comment: Alcoholic Drinks/day: 2 drinks per month    Drug use: No    Sexual activity: Not on file   Other Topics Concern    Not on file   Social History Narrative    Not on file     Social Determinants of Health     Financial Resource Strain: Not on file   Food Insecurity: Not on file   Transportation Needs: Not on file   Physical Activity: Not on file   Stress: Not on file   Social Connections: Not on file   Interpersonal Safety: Not on file   Housing Stability: Not on file            Lab Results    Chemistry/lipid CBC Cardiac Enzymes/BNP/TSH/INR   Lab Results   Component Value Date    CHOL 190 11/08/2022    HDL 55 11/08/2022    TRIG 139 11/08/2022    BUN 19.2 08/23/2023     08/23/2023    CO2 25 08/23/2023    Lab Results   Component Value Date    WBC 11.8 (H) 02/07/2023    HGB 12.9 (L) 02/08/2023    HCT 42.3 02/07/2023    MCV 90 02/07/2023     02/08/2023    Lab Results   Component Value Date    TROPONINI 0.03 03/18/2021    BNP 42 08/23/2023    TSH 1.96 04/22/2019    INR 1.7 (H) 08/01/2023

## 2024-03-11 NOTE — LETTER
3/11/2024    [facility]  [address] [address]    RE: Matthew W Behr       Dear Colleague,     I had the pleasure of seeing Matthew W Behr in the [facility].    [facility]   [address]  [place] [address]   [email or web address]   OFFICE: [phone]     CARDIOLOGY CLINIC NOTE     Thank you, Dr. Ceja, [facility], for asking the [facility] Heart Care team to see Mr. Matthew W Behr to  Follow Up (cardiomyopathy)       Assessment/Recommendations   Assessment:    Chronic Heart failure with recovered LVEF - does not have overt signs of fluid overload but is now complaining of dyspnea on exertion with climbing one flight of stairs. Will evaluate possible cardiac cause with an MRI, but if this is stable then would recommend pursuing pulmonary etiology of his symptoms.  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  Dyslipidemia - currently on ezetimibe and atorvastatin.    Plan:  Cardiac MRI with stress to evaluate LUQUE  Can discontinue ezetimibe  Continue aspirin, spironolactone, carvedilol, enalapril without changes  Can take furosemide as needed to maintain net neutral fluid balance.  Follow up in a year or sooner if needed and based on MRI results.         History of Present Illness   Mr. Matthew W Behr is a 58 year old male who presents for follow-up.    Mr. Behr has a history of systolic heart failure due to prior myocarditis causing a non-ischemic cardiomyopathy in 2021. He has since had recovery of his LVEF from 17% to 50-55%. 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 He was treated with warfarin for several years, but has since discontinued as his LV function has returned to near-normal and has shown  stability.    Currently, Dwain is feeling generally well, but he reports some new LUQUE that occurs with climbing a flight of stairs. This is new compared to 6 months ago. No LE swelling, normal appetite, no change in waist line. Weight stable.    Other than noted above, Mr. Behr denies any chest pain/pressure/tightness, shortness of breath at rest or with exertion, light headedness/dizziness, pre-syncope, syncope, lower extremity swelling, palpitations, paroxysmal nocturnal dyspnea (PND), or orthopnea.     Cardiac Problems and Cardiac Diagnostics     Most Recent Cardiac testing:  ECG dated 1/26/23 (personaly reviewed and interpreted): sinus bradycardia (52 bpm), RBBB    ECHO (report reviewed):   TTE 9/15/23  1.Left ventricular function is decreased. The ejection fraction is 50-55% (borderline).  2.Normal right ventricle size and systolic function.  3.The left atrium is mildly dilated.  4.No hemodynamically significant valvular abnormalities on 2D or color flow imaging.  Compared to the prior study dated 1/9/2023, there have been no changes.    Cardiac MRI 7/22/21  1.  Normal left ventricular size and wall thickness. Systolic function is mildly reduced with global  hypokinesis. The quantified left ventricular ejection fraction is 43%.   2.  Normal right ventricular size. Systolic function is mildly reduced. The quantified right ventricular  ejection fraction is 39%.   3.  There is a small area of midwall fibrosis in the basal inferolateral wall, in a nonischemic pattern.  The location and appearance is typical of the sequelae of a prior episode of myocarditis.  4.  No significant valvular abnormalities.    CT coronary angiogram 1/3/18  Narrative & Impression     The total Agatston calcium score is 0. A calcium score of zero places the individual in the lowest quartile when compared to an age and gender matched control group and implies a low risk of cardiac events in the next 10 years. (less than 1% per year).    No  "detectable atherosclerotic plaque or significant stenosis observed.    Significant misregistration artifact decreases ability to interpret the examination the findings are most consistent with artifact rather than obstructive artery disease.    Mild dilatation of the proximal aorta at the level of the sinuses of Valsalva    Please see radiology interpretation for additional findings.          Medications  Allergies   Current Outpatient Medications   Medication Sig Dispense Refill     ASMANEX  mcg/actuation HFAA Inhale 2 puffs into the lungs 2 times daily       aspirin (ASA) 325 MG tablet Take 1 tablet (325 mg) by mouth daily 90 tablet 3     atorvastatin (LIPITOR) 40 MG tablet Take 1 tablet (40 mg) by mouth daily 90 tablet 3     carvedilol (COREG) 12.5 MG tablet Take 1.5 tablets (18.75 mg) by mouth 2 times daily (with meals) 180 tablet 3     enalapril (VASOTEC) 10 MG tablet Take 1 tablet (10 mg) by mouth daily 90 tablet 3     furosemide (LASIX) 20 MG tablet TAKE 1 TABLET BY MOUTH EVERY DAY 90 tablet 3     hydrOXYzine (ATARAX) 25 MG tablet Take 1 tablet (25 mg) by mouth every 6 hours as needed for other (adjuvant pain)       spironolactone (ALDACTONE) 25 MG tablet Take 1 tablet (25 mg) by mouth daily 90 tablet 3     VENTOLIN HFA 90 mcg/actuation inhaler Take 2 puffs by mouth every 4 hours as needed for shortness of breath or wheezing  0      No Known Allergies     Physical Examination Review of Systems   Vitals: BP 90/62 (BP Location: Right arm, Patient Position: Sitting, Cuff Size: Adult Large)   Pulse 71   Resp 14   Ht 1.753 m (5' 9\")   Wt 95.7 kg (211 lb)   BMI 31.16 kg/m    BMI= Body mass index is 31.16 kg/m .  Wt Readings from Last 3 Encounters:   03/11/24 95.7 kg (211 lb)   12/24/23 95.7 kg (211 lb)   08/23/23 95.3 kg (210 lb)       General: pleasant male. No acute distress.   Neck: No JVD  Lungs: clear to auscultation  COR: normal rate, regular rhythm, No murmurs, rubs, or gallops  Extrem: No " edema        Please refer above for cardiac ROS details.       Past History     Family History:   Family History   Problem Relation Age of Onset     Chronic Obstructive Pulmonary Disease Mother         Social History:   Social History     Socioeconomic History     Marital status:      Spouse name: Not on file     Number of children: Not on file     Years of education: Not on file     Highest education level: Not on file   Occupational History     Not on file   Tobacco Use     Smoking status: Never     Passive exposure: Never     Smokeless tobacco: Former     Types: Chew   Substance and Sexual Activity     Alcohol use: Yes     Comment: Alcoholic Drinks/day: 2 drinks per month     Drug use: No     Sexual activity: Not on file   Other Topics Concern     Not on file   Social History Narrative     Not on file     Social Determinants of Health     Financial Resource Strain: Not on file   Food Insecurity: Not on file   Transportation Needs: Not on file   Physical Activity: Not on file   Stress: Not on file   Social Connections: Not on file   Interpersonal Safety: Not on file   Housing Stability: Not on file            Lab Results    Chemistry/lipid CBC Cardiac Enzymes/BNP/TSH/INR   Lab Results   Component Value Date    CHOL 190 11/08/2022    HDL 55 11/08/2022    TRIG 139 11/08/2022    BUN 19.2 08/23/2023     08/23/2023    CO2 25 08/23/2023    Lab Results   Component Value Date    WBC 11.8 (H) 02/07/2023    HGB 12.9 (L) 02/08/2023    HCT 42.3 02/07/2023    MCV 90 02/07/2023     02/08/2023    Lab Results   Component Value Date    TROPONINI 0.03 03/18/2021    BNP 42 08/23/2023    TSH 1.96 04/22/2019    INR 1.7 (H) 08/01/2023                Thank you for allowing me to participate in the care of your patient.      Sincerely,     Seamus Gaston MD     [facility]  cc:   No referring provider defined for this encounter.

## 2024-03-11 NOTE — PATIENT INSTRUCTIONS
It was a pleasure to meet with you today.      Below is a summary of your visit.   Schedule an MRI of your heart to look for a heart-related reason why  you are having shortness of breath with climbing stairs. If this does not find anything to explain your symptoms, then would consider progression of your asthma as a possible cause  You can stop taking ezetimibe  You can take furosemide as needed to keep you fluid/water status stable.  Continue your carvedilol, spironolactone, enalapril and atorvastatin for your heart.   Follow up in about a year or sooner if needed. You do not need your appointment with Dr. Petit in April.    We will call you to inform you of your test or procedure results within 3 business days of the test being performed.  If you do not hear from our office with the test results within 1 week please do not hesitate to call asking for these results.     Please do not hesitate to call the [facility] with any questions or concerns at [phone]. You can also reach my nurse, Steffanie, at [phone].    Sincerely,

## 2024-03-19 ENCOUNTER — TRANSFERRED RECORDS (OUTPATIENT)
Dept: HEALTH INFORMATION MANAGEMENT | Facility: CLINIC | Age: 59
End: 2024-03-19
Payer: COMMERCIAL

## 2024-03-20 ENCOUNTER — MEDICAL CORRESPONDENCE (OUTPATIENT)
Dept: HEALTH INFORMATION MANAGEMENT | Facility: CLINIC | Age: 59
End: 2024-03-20
Payer: COMMERCIAL

## 2024-03-20 ENCOUNTER — TRANSCRIBE ORDERS (OUTPATIENT)
Dept: OTHER | Age: 59
End: 2024-03-20

## 2024-03-20 DIAGNOSIS — I50.32 HEART FAILURE WITH IMPROVED EJECTION FRACTION (HFIMPEF) (H): Primary | ICD-10-CM

## 2024-04-18 ENCOUNTER — HOSPITAL ENCOUNTER (OUTPATIENT)
Dept: MRI IMAGING | Facility: HOSPITAL | Age: 59
Discharge: HOME | End: 2024-04-18
Attending: INTERNAL MEDICINE
Payer: COMMERCIAL

## 2024-04-18 VITALS — DIASTOLIC BLOOD PRESSURE: 67 MMHG | HEART RATE: 78 BPM | SYSTOLIC BLOOD PRESSURE: 119 MMHG | OXYGEN SATURATION: 95 %

## 2024-04-18 DIAGNOSIS — I42.8 NONISCHEMIC CARDIOMYOPATHY (H): ICD-10-CM

## 2024-04-18 DIAGNOSIS — R07.9 CHEST PAIN WITH NORMAL CORONARY ANGIOGRAPHY: ICD-10-CM

## 2024-04-18 DIAGNOSIS — R06.09 DYSPNEA ON EXERTION: ICD-10-CM

## 2024-04-18 DIAGNOSIS — I50.42 CHRONIC COMBINED SYSTOLIC AND DIASTOLIC HEART FAILURE (H): ICD-10-CM

## 2024-04-18 DIAGNOSIS — R00.0 WIDE-COMPLEX TACHYCARDIA: ICD-10-CM

## 2024-04-18 DIAGNOSIS — I50.23 ACUTE ON CHRONIC SYSTOLIC CONGESTIVE HEART FAILURE (H): ICD-10-CM

## 2024-04-18 DIAGNOSIS — I10 BENIGN ESSENTIAL HYPERTENSION: Primary | ICD-10-CM

## 2024-04-18 DIAGNOSIS — I63.411 CEREBRAL INFARCTION DUE TO EMBOLISM OF RIGHT MIDDLE CEREBRAL ARTERY (H): ICD-10-CM

## 2024-04-18 DIAGNOSIS — I50.20 HEART FAILURE WITH REDUCED EJECTION FRACTION (H): ICD-10-CM

## 2024-04-18 LAB
ATRIAL RATE - MUSE: 50 BPM
ATRIAL RATE - MUSE: 64 BPM
DIASTOLIC BLOOD PRESSURE - MUSE: NORMAL MMHG
DIASTOLIC BLOOD PRESSURE - MUSE: NORMAL MMHG
INTERPRETATION ECG - MUSE: NORMAL
INTERPRETATION ECG - MUSE: NORMAL
P AXIS - MUSE: 48 DEGREES
P AXIS - MUSE: 78 DEGREES
PR INTERVAL - MUSE: 148 MS
PR INTERVAL - MUSE: 158 MS
QRS DURATION - MUSE: 134 MS
QRS DURATION - MUSE: 136 MS
QT - MUSE: 454 MS
QT - MUSE: 466 MS
QTC - MUSE: 424 MS
QTC - MUSE: 468 MS
R AXIS - MUSE: 73 DEGREES
R AXIS - MUSE: 84 DEGREES
SYSTOLIC BLOOD PRESSURE - MUSE: NORMAL MMHG
SYSTOLIC BLOOD PRESSURE - MUSE: NORMAL MMHG
T AXIS - MUSE: 57 DEGREES
T AXIS - MUSE: 67 DEGREES
VENTRICULAR RATE- MUSE: 50 BPM
VENTRICULAR RATE- MUSE: 64 BPM

## 2024-04-18 PROCEDURE — A9585 GADOBUTROL INJECTION: HCPCS | Performed by: INTERNAL MEDICINE

## 2024-04-18 PROCEDURE — 999N000122 MR MYOCARDIUM  OVERREAD

## 2024-04-18 PROCEDURE — 93018 CV STRESS TEST I&R ONLY: CPT | Performed by: INTERNAL MEDICINE

## 2024-04-18 PROCEDURE — 250N000011 HC RX IP 250 OP 636: Performed by: INTERNAL MEDICINE

## 2024-04-18 PROCEDURE — 75563 CARD MRI W/STRESS IMG & DYE: CPT

## 2024-04-18 PROCEDURE — 93017 CV STRESS TEST TRACING ONLY: CPT | Performed by: INTERNAL MEDICINE

## 2024-04-18 PROCEDURE — 255N000002 HC RX 255 OP 636: Performed by: INTERNAL MEDICINE

## 2024-04-18 PROCEDURE — 93005 ELECTROCARDIOGRAM TRACING: CPT

## 2024-04-18 PROCEDURE — 93016 CV STRESS TEST SUPVJ ONLY: CPT | Performed by: INTERNAL MEDICINE

## 2024-04-18 PROCEDURE — 93010 ELECTROCARDIOGRAM REPORT: CPT | Performed by: STUDENT IN AN ORGANIZED HEALTH CARE EDUCATION/TRAINING PROGRAM

## 2024-04-18 PROCEDURE — 75563 CARD MRI W/STRESS IMG & DYE: CPT | Mod: 26 | Performed by: INTERNAL MEDICINE

## 2024-04-18 RX ORDER — GADOBUTROL 604.72 MG/ML
20 INJECTION INTRAVENOUS ONCE
Status: COMPLETED | OUTPATIENT
Start: 2024-04-18 | End: 2024-04-18

## 2024-04-18 RX ORDER — REGADENOSON 0.08 MG/ML
0.4 INJECTION, SOLUTION INTRAVENOUS ONCE
Status: COMPLETED | OUTPATIENT
Start: 2024-04-18 | End: 2024-04-18

## 2024-04-18 RX ADMIN — REGADENOSON 0.4 MG: 0.08 INJECTION, SOLUTION INTRAVENOUS at 08:31

## 2024-04-18 RX ADMIN — GADOBUTROL 20 ML: 604.72 INJECTION INTRAVENOUS at 08:25

## 2024-04-18 NOTE — PROGRESS NOTES
Increase shortness of breath and frequent coughing.  Denies chest pain.  Has cardiac hx.  Minimal activity causes sx.  Madison Gabriel RN

## 2024-04-29 ENCOUNTER — TELEPHONE (OUTPATIENT)
Dept: CARDIOLOGY | Facility: CLINIC | Age: 59
End: 2024-04-29
Payer: COMMERCIAL

## 2024-04-29 DIAGNOSIS — R06.09 DYSPNEA ON EXERTION: Primary | ICD-10-CM

## 2024-04-29 NOTE — TELEPHONE ENCOUNTER
[facility]    Phone Message    May a detailed message be left on voicemail: yes     Reason for Call: Requesting Results     Name/type of test: MRI stress test  Date of test: 4/18/24  Was test done at a location other than [facility] (Please fill in the location if not [facility])?: No    Action Taken: Other: cardiology    Travel Screening: Not Applicable  supraclavicular

## 2024-04-29 NOTE — TELEPHONE ENCOUNTER
"Spoke with Pt regarding recommendations from Dr. Gaston. Referral placed for pulmonary clinic-    \"Your cardiac MRI is overall reassuring without evidence of impaired blood flow to your heart. Your heart function remains normal. Your shortness of breath does not seem likely related to your heart and you should discuss lung related causes with Dr Martinez. If you want, I could also place a referral to our pulmonary clinic to evaluate your shortness of breath.  Dr. Gaston\"  "

## 2024-09-28 ENCOUNTER — HEALTH MAINTENANCE LETTER (OUTPATIENT)
Age: 59
End: 2024-09-28

## 2024-10-20 DIAGNOSIS — I50.9 ACUTE DECOMPENSATED HEART FAILURE (H): ICD-10-CM

## 2024-10-21 RX ORDER — FUROSEMIDE 20 MG/1
20 TABLET ORAL DAILY PRN
Qty: 90 TABLET | Refills: 3 | Status: SHIPPED | OUTPATIENT
Start: 2024-10-21

## 2025-02-25 ENCOUNTER — OFFICE VISIT (OUTPATIENT)
Dept: CARDIOLOGY | Facility: CLINIC | Age: 60
End: 2025-02-25
Payer: COMMERCIAL

## 2025-02-25 VITALS
RESPIRATION RATE: 14 BRPM | BODY MASS INDEX: 30.42 KG/M2 | HEART RATE: 56 BPM | SYSTOLIC BLOOD PRESSURE: 124 MMHG | WEIGHT: 206 LBS | DIASTOLIC BLOOD PRESSURE: 82 MMHG

## 2025-02-25 DIAGNOSIS — I42.9 CARDIOMYOPATHY, UNSPECIFIED TYPE (H): Primary | ICD-10-CM

## 2025-02-25 DIAGNOSIS — I50.9 ACUTE DECOMPENSATED HEART FAILURE (H): ICD-10-CM

## 2025-02-25 DIAGNOSIS — I10 HYPERTENSION, UNSPECIFIED TYPE: ICD-10-CM

## 2025-02-25 PROCEDURE — 99214 OFFICE O/P EST MOD 30 MIN: CPT | Performed by: INTERNAL MEDICINE

## 2025-02-25 PROCEDURE — 3079F DIAST BP 80-89 MM HG: CPT | Performed by: INTERNAL MEDICINE

## 2025-02-25 PROCEDURE — 3074F SYST BP LT 130 MM HG: CPT | Performed by: INTERNAL MEDICINE

## 2025-02-25 RX ORDER — CARVEDILOL 6.25 MG/1
6.25 TABLET ORAL 2 TIMES DAILY WITH MEALS
Status: SHIPPED
Start: 2025-02-25

## 2025-02-25 RX ORDER — FUROSEMIDE 20 MG/1
20 TABLET ORAL DAILY PRN
Qty: 90 TABLET | Refills: 3 | Status: SHIPPED | OUTPATIENT
Start: 2025-02-25

## 2025-02-25 NOTE — PROGRESS NOTES
[facility] [address], [place] [address]   [email or web address]   OFFICE: [phone]            Impression and Plan     1.  Cardiomyopathy.  As noted below, Dwain has a history of nonischemic cardiomyopathy felt related to myocarditis.  At onepoint he had an ejection fraction documented at 17% though with time and medical therapy has been found to have improvement in left ventricular systolic performance.  Cardiac MRI 18 April 2024 revealing ejection fraction of 60%.  Dwain also has history of asthma/reactive airway disease.  He states that this is somewhat exacerbated by the beta-blocker therapy.  Will reduce carvedilol to 6.25 mg twice daily.  Plan to obtain echocardiogram and if left ventricular systolic performance remains well-preserved will consider discontinuation of carvedilol altogether given the aforementioned.    2.  Hypertension.  Blood pressure is reasonable in the office today.      35 minutes spent reviewing prior records (including documentation, laboratory studies, cardiac testing/imaging), interview with patient along with physical exam, planning, and subsequent documentation/crafting of note).           History of Present Illness    Once again I would like to thank you again for asking me to participate in the care of your patient, Matthew W Behr.  As you know, but to reiterate for my own records, Matthew W Behr is a 59 year old male with [facility] by Dr. Seamus Gaston.    He has a history of nonischemic cardiomyopathy felt related to myocarditis.  At one point he had an ejection fraction documented at 17% though with time and medical therapy has been found to have improvement in left ventricular systolic performance.  Cardiac MRI 18 April 2024 revealing ejection fraction of 60%.    Further review of systems is otherwise negative/noncontributory (medical record and 13 point review of systems  reviewed as well and pertinent positives noted).         Cardiac Diagnostics      Cardiac MRI 18 April 2024:  Pharmacological Regadenoson stress cardiac MRI is negative for inducible myocardial ischemia.   Pharmacological stress ECG is negative for inducible myocardial ischemia.   Normal left ventricular size, wall thickness and systolic function. The quantified left ventricular ejection fraction is 59.9%.  Very small focal area of endocardial myocardial scar noted in the distal anterolateral wall segment likely represent prior distal coronary embolization.       Normal right ventricular size and systolic function.    No significant valvular abnormalities.    Echocardiogram 31 December 2017 (personally reviewed):  The left ventricle is mildly enlarged. Left ventricular systolic performance is severely reduced. The ejection fraction is estimated to be 25%.   There is severe global reduction in left ventricular systolic performance.  There is moderate to severe mitral insufficiency.   There is mild-moderate, to perhaps moderate, tricuspid insufficiency.  There is mild right ventricular enlargement with moderate depression in left ventricular systolic performance.  There is moderate left atrial enlargement. There is mild right atrial enlargement.   Right ventricular systolic pressure relative to right atrial pressure is moderately increased.  The pulmonary artery pressure is estimated to be 50-55 mmHg plus right atrial pressure. In addition, the IVC appears dilated with decreased phasic variation in caval diameter consistent with elevated right atrial pressure.    CT coronary angiogram 3 January 2018:  The total Agatston calcium score is 0. A calcium score of zero places the individual in the lowest quartile when compared to an age and gender matched control group and implies a low risk of cardiac events in the next 10 years. (less than 1% per year).  No detectable atherosclerotic plaque or significant stenosis  observed.  Significant misregistration artifact decreases ability to interpret the examination the findings are most consistent with artifact rather than obstructive artery disease.  Mild dilatation of the proximal aorta at the level of the sinuses of Valsalva     Twelve-lead ECG (personally reviewed) 1 January 2018: Sinus rhythm with premature atrial contractions.  Right bundle branch block.         Physical Examination       /82 (BP Location: Right arm, Patient Position: Sitting, Cuff Size: Adult Large)   Pulse 56   Resp 14   Wt 93.4 kg (206 lb)   BMI 30.42 kg/m          Wt Readings from Last 3 Encounters:   02/25/25 93.4 kg (206 lb)   03/11/24 95.7 kg (211 lb)   12/24/23 95.7 kg (211 lb)       The patient is alert and oriented times three. Sclerae are anicteric. Mucosal membranes are moist. Jugular venous pressure is normal. No significant adenopathy/thyromegally appreciated. Lungs are clear with good expansion. On cardiovascular exam, the patient has a regular S1 and S2. Abdomen is soft and non-tender. Extremities reveal no clubbing, cyanosis, or edema.         Family History/Social History/Risk Factors   Patient does not smoke.  Family history reviewed, and family history includes Chronic Obstructive Pulmonary Disease in his mother.          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    patient denies    Arthritis     Asthma     Cellulitis     Left hand    Cerebral artery occlusion with cerebral infarction (H) 2021    Chest pain with normal coronary angiography 01/03/2018    Congestive heart failure (H)     Hypertension     Nonischemic cardiomyopathy (H) 01/10/2018    Janel Parkinson White pattern seen on electrocardiogram      Past Surgical History:   Procedure Laterality Date    ARTHROPLASTY HIP Right 2/7/2023    Procedure: RIGHT TOTAL HIP ARTHROPLASTY;   Surgeon: Agustín Virk MD;  Location: [facility] Main OR    OTHER SURGICAL HISTORY  2003    OTHER SURGICAL [facility]ORYCard[facility]     Family History   Problem Relation Age of Onset    Chronic Obstructive Pulmonary Disease Mother         Social History     Socioeconomic History    Marital status:      Spouse name: Not on file    Number of children: Not on file    Years of education: Not on file    Highest education level: Not on file   Occupational History    Not on file   Tobacco Use    Smoking status: Never     Passive exposure: Never    Smokeless tobacco: Former     Types: Chew   Substance and Sexual Activity    Alcohol use: Yes     Comment: Alcoholic Drinks/day: 2 drinks per month    Drug use: No    Sexual activity: Not on file   Other Topics Concern    Not on file   Social History Narrative    Not on file     Social Drivers of Health     Financial Resource Strain: Not on file   Food Insecurity: Not on file   Transportation Needs: Not on file   Physical Activity: Not on file   Stress: Not on file   Social Connections: Not on file   Interpersonal Safety: Not on file   Housing Stability: Not on file           Medications  Allergies   Current Outpatient Medications   Medication Sig Dispense Refill    ASMANEX  mcg/actuation HFAA Inhale 2 puffs into the lungs 2 times daily      aspirin (ASA) 325 MG tablet Take 1 tablet (325 mg) by mouth daily 90 tablet 3    atorvastatin (LIPITOR) 40 MG tablet Take 1 tablet (40 mg) by mouth daily 90 tablet 3    carvedilol (COREG) 6.25 MG tablet Take 1 tablet (6.25 mg) by mouth 2 times daily (with meals).      enalapril (VASOTEC) 10 MG tablet Take 1 tablet (10 mg) by mouth daily 90 tablet 3    furosemide (LASIX) 20 MG tablet Take 1 tablet (20 mg) by mouth daily as needed (to maintain net neutral fluid balance). 90 tablet 3    hydrOXYzine (ATARAX) 25 MG tablet Take 1 tablet (25 mg) by mouth every 6 hours as needed for other (adjuvant  "pain)      spironolactone (ALDACTONE) 25 MG tablet Take 1 tablet (25 mg) by mouth daily 90 tablet 3    VENTOLIN HFA 90 mcg/actuation inhaler Take 2 puffs by mouth every 4 hours as needed for shortness of breath or wheezing  0     No Known Allergies       Lab Results    Chemistry/lipid CBC Cardiac Enzymes/BNP/TSH/INR   Recent Labs   Lab Test 11/08/22  0858   CHOL 190   HDL 55   *   TRIG 139     Recent Labs   Lab Test 11/08/22  0858 03/16/22  0741 01/28/22  0836   * 100 111     Recent Labs   Lab Test 08/23/23  0829      POTASSIUM 4.7   CHLORIDE 104   CO2 25   *   BUN 19.2   CR 0.83   GFRESTIMATED >90   ELLIS 8.9     Recent Labs   Lab Test 08/23/23  0829 02/07/23  1619 11/30/22  0807   CR 0.83 0.86 1.03     No results for input(s): \"A1C\" in the last 09763 hours.       Recent Labs   Lab Test 02/08/23  0659 02/07/23  1619   WBC  --  11.8*   HGB 12.9* 14.0   HCT  --  42.3   MCV  --  90    232     Recent Labs   Lab Test 02/08/23  0659 02/07/23  1619 03/20/21  0439   HGB 12.9* 14.0 16.4    Recent Labs   Lab Test 03/18/21  1544 03/17/21  1118 03/15/21  0832   TROPONINI 0.03 0.01 0.04     Recent Labs   Lab Test 08/23/23  0829 11/08/22  0858 01/28/22  0846 09/30/21  0825   BNP 42  --  49* 79*   NTBNP  --  156  --   --      Recent Labs   Lab Test 04/22/19  1924   TSH 1.96     Recent Labs   Lab Test 08/01/23  0706 05/18/23  0700 05/02/23  1042   INR 1.7* 2.4* 1.71*          Medications  Allergies   Current Outpatient Medications   Medication Sig Dispense Refill    ASMANEX  mcg/actuation HFAA Inhale 2 puffs into the lungs 2 times daily      aspirin (ASA) 325 MG tablet Take 1 tablet (325 mg) by mouth daily 90 tablet 3    atorvastatin (LIPITOR) 40 MG tablet Take 1 tablet (40 mg) by mouth daily 90 tablet 3    carvedilol (COREG) 6.25 MG tablet Take 1 tablet (6.25 mg) by mouth 2 times daily (with meals).      enalapril (VASOTEC) 10 MG tablet Take 1 tablet (10 mg) by mouth daily 90 tablet 3    " furosemide (LASIX) 20 MG tablet Take 1 tablet (20 mg) by mouth daily as needed (to maintain net neutral fluid balance). 90 tablet 3    hydrOXYzine (ATARAX) 25 MG tablet Take 1 tablet (25 mg) by mouth every 6 hours as needed for other (adjuvant pain)      spironolactone (ALDACTONE) 25 MG tablet Take 1 tablet (25 mg) by mouth daily 90 tablet 3    VENTOLIN HFA 90 mcg/actuation inhaler Take 2 puffs by mouth every 4 hours as needed for shortness of breath or wheezing  0      No Known Allergies       Lab Results   Lab Results   Component Value Date     08/23/2023    CO2 25 08/23/2023    CO2 25 02/07/2023    BUN 19.2 08/23/2023    BUN 24 02/07/2023     Lab Results   Component Value Date    WBC 11.8 02/07/2023    HGB 12.9 02/08/2023    HCT 42.3 02/07/2023    MCV 90 02/07/2023     02/08/2023     Lab Results   Component Value Date    CHOL 190 11/08/2022    TRIG 139 11/08/2022    HDL 55 11/08/2022     Lab Results   Component Value Date    INR 1.7 08/01/2023    INR 1.71 05/02/2023    INR 2.7 03/15/2023    INR 1.9 11/08/2022     Lab Results   Component Value Date    BNP 42 08/23/2023     Lab Results   Component Value Date    TROPONINI 0.03 03/18/2021    TROPONINI 0.01 03/17/2021    TROPONINI 0.04 03/15/2021     Lab Results   Component Value Date    TSH 1.96 04/22/2019

## 2025-03-24 ENCOUNTER — HOSPITAL ENCOUNTER (OUTPATIENT)
Dept: CARDIOLOGY | Facility: HOSPITAL | Age: 60
Discharge: HOME | End: 2025-03-24
Attending: INTERNAL MEDICINE | Admitting: INTERNAL MEDICINE
Payer: COMMERCIAL

## 2025-03-24 DIAGNOSIS — I42.9 CARDIOMYOPATHY, UNSPECIFIED TYPE (H): ICD-10-CM

## 2025-03-24 LAB — LVEF ECHO: NORMAL

## 2025-03-24 PROCEDURE — 93306 TTE W/DOPPLER COMPLETE: CPT | Mod: 26 | Performed by: INTERNAL MEDICINE

## 2025-03-24 PROCEDURE — 93306 TTE W/DOPPLER COMPLETE: CPT

## 2025-04-01 ENCOUNTER — TELEPHONE (OUTPATIENT)
Dept: CARDIOLOGY | Facility: CLINIC | Age: 60
End: 2025-04-01
Payer: COMMERCIAL

## 2025-04-01 NOTE — TELEPHONE ENCOUNTER
----- Message from Franchesca Lopez sent at 4/1/2025  9:12 AM CDT -----  Hello ,     I received a call from the patient above requesting a call back form a nurse about  DOT physical clearance ,PT want to know if he need to be seen again or could he be cleared as he was seen on 02/25/25 , PT agreed that leaving a detailed VM is okay .        Thanks Franchesca

## 2025-04-01 NOTE — TELEPHONE ENCOUNTER
Detailed message left with my direct number to call and inform how he would like to get DOT letter.  Letter created.  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  Thanks!!

## 2025-04-01 NOTE — TELEPHONE ENCOUNTER
Dr. Petit,  This patient was seen in clinic 2/25/25 and completed his echo 3/24/25.  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  Thank you,  Ramona

## 2025-04-06 DIAGNOSIS — I42.8 NONISCHEMIC CARDIOMYOPATHY (H): ICD-10-CM

## 2025-04-07 RX ORDER — ENALAPRIL MALEATE 10 MG/1
10 TABLET ORAL DAILY
Qty: 90 TABLET | Refills: 2 | Status: SHIPPED | OUTPATIENT
Start: 2025-04-07

## 2025-04-19 DIAGNOSIS — I63.411 CEREBRAL INFARCTION DUE TO EMBOLISM OF RIGHT MIDDLE CEREBRAL ARTERY (H): ICD-10-CM

## 2025-04-21 RX ORDER — ATORVASTATIN CALCIUM 40 MG/1
40 TABLET, FILM COATED ORAL DAILY
Qty: 90 TABLET | Refills: 2 | Status: SHIPPED | OUTPATIENT
Start: 2025-04-21
